# Patient Record
Sex: FEMALE | Race: WHITE | NOT HISPANIC OR LATINO | Employment: FULL TIME | URBAN - METROPOLITAN AREA
[De-identification: names, ages, dates, MRNs, and addresses within clinical notes are randomized per-mention and may not be internally consistent; named-entity substitution may affect disease eponyms.]

---

## 2018-06-22 ENCOUNTER — OFFICE VISIT (OUTPATIENT)
Dept: URGENT CARE | Facility: CLINIC | Age: 53
End: 2018-06-22

## 2018-06-22 ENCOUNTER — APPOINTMENT (OUTPATIENT)
Dept: RADIOLOGY | Facility: IMAGING CENTER | Age: 53
End: 2018-06-22
Attending: NURSE PRACTITIONER

## 2018-06-22 VITALS
WEIGHT: 104 LBS | DIASTOLIC BLOOD PRESSURE: 86 MMHG | HEART RATE: 80 BPM | OXYGEN SATURATION: 97 % | SYSTOLIC BLOOD PRESSURE: 130 MMHG | TEMPERATURE: 98 F | HEIGHT: 62 IN | BODY MASS INDEX: 19.14 KG/M2 | RESPIRATION RATE: 16 BRPM

## 2018-06-22 DIAGNOSIS — M94.0 ACUTE COSTOCHONDRITIS: ICD-10-CM

## 2018-06-22 DIAGNOSIS — R07.81 RIB PAIN ON RIGHT SIDE: ICD-10-CM

## 2018-06-22 DIAGNOSIS — R07.89 DISCOMFORT IN CHEST: ICD-10-CM

## 2018-06-22 PROCEDURE — 99213 OFFICE O/P EST LOW 20 MIN: CPT | Performed by: NURSE PRACTITIONER

## 2018-06-22 PROCEDURE — 71101 X-RAY EXAM UNILAT RIBS/CHEST: CPT | Mod: TC,RT | Performed by: NURSE PRACTITIONER

## 2018-06-22 PROCEDURE — 93000 ELECTROCARDIOGRAM COMPLETE: CPT | Performed by: NURSE PRACTITIONER

## 2018-06-22 ASSESSMENT — ENCOUNTER SYMPTOMS
SPUTUM PRODUCTION: 0
DIAPHORESIS: 0
VOMITING: 0
COUGH: 1
SHORTNESS OF BREATH: 0
BACK PAIN: 1
DIZZINESS: 0
HEMOPTYSIS: 0
NAUSEA: 0
FEVER: 0
CHILLS: 0
WHEEZING: 0

## 2018-06-22 NOTE — PROGRESS NOTES
"Subjective:      Alexus Saldaña is a 53 y.o. female who presents with Rib Pain            HPI    ROS       Objective:     /86   Pulse 80   Temp 36.7 °C (98 °F)   Resp 16   Ht 1.575 m (5' 2\")   Wt 47.2 kg (104 lb)   SpO2 97%   BMI 19.02 kg/m²      Physical Exam            Assessment/Plan:     There are no diagnoses linked to this encounter.      "

## 2018-06-22 NOTE — PROGRESS NOTES
Subjective:      Alexus Saldaña is a 53 y.o. female who presents with Rib Pain            HPI New problem. 53 year old female presenting with right sided rib pain with no definite injury as well as chest discomfort that she cannot articulate. She denies fever, chills, shortness of breath, wheezing. She is a smoker and was at one time on lipitor for cholesterol. She states her rib pain is worse with deep breath and cough. She has no nausea or diaphoresis. She also has milder pain on left side of back. Taking aleve for this with no improvement. Symptoms have been ongoing for 3 weeks.  Patient has no known allergies.  Current Outpatient Prescriptions on File Prior to Visit   Medication Sig Dispense Refill   • ibuprofen (MOTRIN) 600 MG Tab Take 600 mg by mouth every 6 hours as needed.     • Cholecalciferol (VITAMIN D PO) Take  by mouth.     • phenazopyridine (PYRIDIUM) 200 MG Tab Take 1 Tab by mouth 3 times a day as needed. 6 Tab 0   • loratadine (CLARITIN) 10 MG Tab Take 10 mg by mouth every day.     • aspirin (ASA) 325 MG Tab Take 325 mg by mouth every 6 hours as needed.     • atorvastatin (LIPITOR) 40 MG Tab Take 1 Tab by mouth every day. 30 Tab 11     No current facility-administered medications on file prior to visit.      Social History     Social History   • Marital status:      Spouse name: N/A   • Number of children: N/A   • Years of education: N/A     Occupational History   • Not on file.     Social History Main Topics   • Smoking status: Current Every Day Smoker     Packs/day: 0.50     Types: Cigarettes   • Smokeless tobacco: Never Used   • Alcohol use Yes      Comment: occ   • Drug use: No   • Sexual activity: Not on file     Other Topics Concern   • Not on file     Social History Narrative   • No narrative on file     family history is not on file.      Review of Systems   Constitutional: Positive for malaise/fatigue. Negative for chills, diaphoresis and fever.   HENT: Negative for congestion.   "  Respiratory: Positive for cough. Negative for hemoptysis, sputum production, shortness of breath and wheezing.    Cardiovascular: Positive for chest pain.   Gastrointestinal: Negative for nausea and vomiting.   Musculoskeletal: Positive for back pain.   Neurological: Negative for dizziness.          Objective:     /86   Pulse 80   Temp 36.7 °C (98 °F)   Resp 16   Ht 1.575 m (5' 2\")   Wt 47.2 kg (104 lb)   SpO2 97%   BMI 19.02 kg/m²      Physical Exam   Constitutional: She is oriented to person, place, and time. She appears well-developed and well-nourished. No distress.   HENT:   Head: Normocephalic and atraumatic.   Eyes: Conjunctivae are normal. Right eye exhibits no discharge. Left eye exhibits no discharge.   Neck: Normal range of motion. Neck supple.   Cardiovascular: Normal rate, regular rhythm and normal heart sounds.    No murmur heard.  Pulmonary/Chest: Effort normal and breath sounds normal. No respiratory distress. She exhibits tenderness.   Musculoskeletal: Normal range of motion.   Normal movement of all 4 extremities.   Lymphadenopathy:     She has no cervical adenopathy.        Right: No supraclavicular adenopathy present.        Left: No supraclavicular adenopathy present.   Neurological: She is alert and oriented to person, place, and time. Gait normal.   Skin: Skin is warm and dry.   Psychiatric: She has a normal mood and affect. Her behavior is normal. Thought content normal.   Nursing note and vitals reviewed.              Assessment/Plan:     1. Acute costochondritis     2. Discomfort in chest  EKG - Clinic performed   3. Rib pain on right side  [id number] (WITH 1-VIEW CXR) RIGHT     ekg with NSR, rate of 69, no ectopy or ST deviation  Chest with no evidence of rib fracture  nsaids and rest.  Patient information handout regarding diagnosis given to patient with AVS.  "

## 2018-07-13 ENCOUNTER — HOSPITAL ENCOUNTER (OUTPATIENT)
Facility: MEDICAL CENTER | Age: 53
End: 2018-07-13
Attending: PHYSICIAN ASSISTANT

## 2018-07-13 ENCOUNTER — OFFICE VISIT (OUTPATIENT)
Dept: URGENT CARE | Facility: CLINIC | Age: 53
End: 2018-07-13

## 2018-07-13 VITALS
BODY MASS INDEX: 19.14 KG/M2 | RESPIRATION RATE: 16 BRPM | DIASTOLIC BLOOD PRESSURE: 74 MMHG | HEART RATE: 98 BPM | HEIGHT: 62 IN | SYSTOLIC BLOOD PRESSURE: 124 MMHG | WEIGHT: 104 LBS | TEMPERATURE: 99.5 F | OXYGEN SATURATION: 97 %

## 2018-07-13 DIAGNOSIS — R30.0 DYSURIA: ICD-10-CM

## 2018-07-13 DIAGNOSIS — R30.0 DYSURIA: Primary | ICD-10-CM

## 2018-07-13 LAB
APPEARANCE UR: NORMAL
BILIRUB UR STRIP-MCNC: NEGATIVE MG/DL
COLOR UR AUTO: YELLOW
GLUCOSE UR STRIP.AUTO-MCNC: NEGATIVE MG/DL
KETONES UR STRIP.AUTO-MCNC: NEGATIVE MG/DL
LEUKOCYTE ESTERASE UR QL STRIP.AUTO: NORMAL
NITRITE UR QL STRIP.AUTO: POSITIVE
PH UR STRIP.AUTO: 7 [PH] (ref 5–8)
PROT UR QL STRIP: 100 MG/DL
RBC UR QL AUTO: NORMAL
SP GR UR STRIP.AUTO: 1.01
UROBILINOGEN UR STRIP-MCNC: NEGATIVE MG/DL

## 2018-07-13 PROCEDURE — 81002 URINALYSIS NONAUTO W/O SCOPE: CPT | Performed by: PHYSICIAN ASSISTANT

## 2018-07-13 PROCEDURE — 87186 SC STD MICRODIL/AGAR DIL: CPT

## 2018-07-13 PROCEDURE — 87086 URINE CULTURE/COLONY COUNT: CPT

## 2018-07-13 PROCEDURE — 99214 OFFICE O/P EST MOD 30 MIN: CPT | Performed by: PHYSICIAN ASSISTANT

## 2018-07-13 PROCEDURE — 87077 CULTURE AEROBIC IDENTIFY: CPT

## 2018-07-13 RX ORDER — SULFAMETHOXAZOLE AND TRIMETHOPRIM 800; 160 MG/1; MG/1
1 TABLET ORAL EVERY 12 HOURS
Qty: 14 TAB | Refills: 0 | Status: SHIPPED | OUTPATIENT
Start: 2018-07-13 | End: 2018-07-20

## 2018-07-13 RX ORDER — PHENAZOPYRIDINE HYDROCHLORIDE 200 MG/1
200 TABLET, FILM COATED ORAL 3 TIMES DAILY
Qty: 6 TAB | Refills: 0 | Status: SHIPPED | OUTPATIENT
Start: 2018-07-13 | End: 2018-07-15

## 2018-07-13 NOTE — LETTER
July 13, 2018       Patient: Alexus Saldaña   YOB: 1965   Date of Visit: 7/13/2018         To Whom It May Concern:    It is my medical opinion that Alexus Saldaña may be excused from work for the date of 7/13/18.      If you have any questions or concerns, please don't hesitate to call [phone]          Sincerely,          Sen Traylor P.A.-C.  Electronically Signed

## 2018-07-13 NOTE — PROGRESS NOTES
"Subjective:      Pt is a 53 y.o. female who presents with UTI (pt states she noticed blood in her urine x 1 week and states her insides hurt)            HPI  PT comes into the UC with a chief complaint of dysuria, burning on urination, urgency, frequency, and bladder pressure and has noticed blood in her urine x 7 days. PT denies fevers or chills, CP, SOB, NVD, paresthesias, headaches, dizziness, change in vision, hives, or joint pain. PT states the pain is a 5/10 with burning upon urination, aching in nature and worse at night. She states she has not taken any RX meds for this issue. She denies flank or back pain as well. The pt's medication list, problem list, and allergies have been evaluated and reviewed during today's visit.      PMH:  Past Medical History:   Diagnosis Date   • TIA (transient ischemic attack)     pt states \"I have been diagnosed with mini strokes\"       PSH:  Past Surgical History:   Procedure Laterality Date   • BIOPSY GENERAL      thyroid   • BREAST BIOPSY         Fam Hx:  the patient's family history is not pertinent to their current complaint    Soc HX:  Social History     Social History   • Marital status:      Spouse name: N/A   • Number of children: N/A   • Years of education: N/A     Occupational History   • Not on file.     Social History Main Topics   • Smoking status: Current Every Day Smoker     Packs/day: 0.50     Types: Cigarettes   • Smokeless tobacco: Never Used   • Alcohol use Yes      Comment: occ   • Drug use: No   • Sexual activity: Not on file     Other Topics Concern   • Not on file     Social History Narrative   • No narrative on file         Medications:    Current Outpatient Prescriptions:   •  Acetaminophen (TYLENOL EXTRA STRENGTH PO), Take  by mouth., Disp: , Rfl:   •  sulfamethoxazole-trimethoprim (BACTRIM DS) 800-160 MG tablet, Take 1 Tab by mouth every 12 hours for 7 days., Disp: 14 Tab, Rfl: 0  •  phenazopyridine (PYRIDIUM) 200 MG Tab, Take 1 Tab by mouth 3 " "times a day for 2 days., Disp: 6 Tab, Rfl: 0  •  ibuprofen (MOTRIN) 600 MG Tab, Take 600 mg by mouth every 6 hours as needed., Disp: , Rfl:   •  loratadine (CLARITIN) 10 MG Tab, Take 10 mg by mouth every day., Disp: , Rfl:   •  aspirin (ASA) 325 MG Tab, Take 325 mg by mouth every 6 hours as needed., Disp: , Rfl:   •  Cholecalciferol (VITAMIN D PO), Take  by mouth., Disp: , Rfl:   •  atorvastatin (LIPITOR) 40 MG Tab, Take 1 Tab by mouth every day., Disp: 30 Tab, Rfl: 11      Allergies:  Patient has no known allergies.    ROS  Review of Systems   Constitutional: Negative for fever, chills and malaise/fatigue.   HENT: Negative for congestion and sore throat.    Eyes: Negative for blurred vision, double vision and photophobia.   Respiratory: Negative for cough and shortness of breath.    Cardiovascular: Negative for chest pain and palpitations.   Gastrointestinal: Negative for nausea, vomiting, abdominal pain, diarrhea and constipation.   Genitourinary: Positive for dysuria, urgency and frequency.   Musculoskeletal: Negative for joint pain and myalgias.   Skin: Negative for rash.   Neurological: Negative for dizziness, tingling and headaches.   Endo/Heme/Allergies: Does not bruise/bleed easily.   Psychiatric/Behavioral: Negative for depression. The patient is not nervous/anxious.           Objective:     /74   Pulse 98   Temp 37.5 °C (99.5 °F)   Resp 16   Ht 1.575 m (5' 2\")   Wt 47.2 kg (104 lb)   SpO2 97%   BMI 19.02 kg/m²      Physical Exam      Physical Exam   Constitutional: She is oriented to person, place, and time. She appears well-developed and well-nourished. No distress.   HENT:   Head: Normocephalic and atraumatic.   Right Ear: External ear normal.   Left Ear: External ear normal.   Nose: Nose normal.   Mouth/Throat: Oropharynx is clear and moist. No oropharyngeal exudate.   Eyes: Conjunctivae normal and EOM are normal. Pupils are equal, round, and reactive to light.   Neck: Normal range of " motion. Neck supple. No thyromegaly present.   Cardiovascular: Normal rate, regular rhythm, normal heart sounds and intact distal pulses.  Exam reveals no gallop and no friction rub.    No murmur heard.  Pulmonary/Chest: Effort normal and breath sounds normal. No respiratory distress. She has no wheezes. She has no rales. She exhibits no tenderness.   Abdominal: Soft. Bowel sounds are normal. She exhibits no distension and no mass. There is no tenderness. There is no rebound and no guarding.   Genitourinary:        Pt deferred   Musculoskeletal: Normal range of motion. She exhibits no edema and no tenderness.   Lymphadenopathy:     She has no cervical adenopathy.   Neurological: She is alert and oriented to person, place, and time. She has normal reflexes. No cranial nerve deficit.   Skin: Skin is warm and dry. No rash noted. No erythema.   Psychiatric: She has a normal mood and affect. Her behavior is normal. Judgment and thought content normal.          Assessment/Plan:     1. Dysuria    - POCT Urinalysis-->LEUKS AND BLOOD  - Urine Culture; Future  - sulfamethoxazole-trimethoprim (BACTRIM DS) 800-160 MG tablet; Take 1 Tab by mouth every 12 hours for 7 days.  Dispense: 14 Tab; Refill: 0  - phenazopyridine (PYRIDIUM) 200 MG Tab; Take 1 Tab by mouth 3 times a day for 2 days.  Dispense: 6 Tab; Refill: 0    Rest, fluids encouraged.  AVS with medical info given.  Pt was in full understanding and agreement with the plan.  Follow-up as needed if symptoms worsen or fail to improve.

## 2018-07-15 LAB
BACTERIA UR CULT: ABNORMAL
BACTERIA UR CULT: ABNORMAL
SIGNIFICANT IND 70042: ABNORMAL
SITE SITE: ABNORMAL
SOURCE SOURCE: ABNORMAL

## 2018-07-16 ENCOUNTER — TELEPHONE (OUTPATIENT)
Dept: URGENT CARE | Facility: CLINIC | Age: 53
End: 2018-07-16

## 2018-07-16 ENCOUNTER — HOSPITAL ENCOUNTER (EMERGENCY)
Facility: MEDICAL CENTER | Age: 53
End: 2018-07-16
Attending: EMERGENCY MEDICINE
Payer: COMMERCIAL

## 2018-07-16 VITALS
WEIGHT: 99.21 LBS | HEIGHT: 62 IN | OXYGEN SATURATION: 98 % | DIASTOLIC BLOOD PRESSURE: 77 MMHG | TEMPERATURE: 98.3 F | BODY MASS INDEX: 18.26 KG/M2 | HEART RATE: 74 BPM | SYSTOLIC BLOOD PRESSURE: 111 MMHG | RESPIRATION RATE: 16 BRPM

## 2018-07-16 DIAGNOSIS — R11.2 NON-INTRACTABLE VOMITING WITH NAUSEA, UNSPECIFIED VOMITING TYPE: ICD-10-CM

## 2018-07-16 LAB
ALBUMIN SERPL BCP-MCNC: 3.2 G/DL (ref 3.2–4.9)
ALBUMIN/GLOB SERPL: 0.9 G/DL
ALP SERPL-CCNC: 83 U/L (ref 30–99)
ALT SERPL-CCNC: 25 U/L (ref 2–50)
ANION GAP SERPL CALC-SCNC: 11 MMOL/L (ref 0–11.9)
APPEARANCE UR: ABNORMAL
AST SERPL-CCNC: 22 U/L (ref 12–45)
BACTERIA #/AREA URNS HPF: ABNORMAL /HPF
BASOPHILS # BLD AUTO: 0.3 % (ref 0–1.8)
BASOPHILS # BLD: 0.03 K/UL (ref 0–0.12)
BILIRUB SERPL-MCNC: 0.3 MG/DL (ref 0.1–1.5)
BILIRUB UR QL STRIP.AUTO: NEGATIVE
BUN SERPL-MCNC: 26 MG/DL (ref 8–22)
CALCIUM SERPL-MCNC: 9 MG/DL (ref 8.4–10.2)
CASTS 1761B: ABNORMAL /LPF
CASTS URNS QL MICRO: ABNORMAL /LPF
CHLORIDE SERPL-SCNC: 100 MMOL/L (ref 96–112)
CO2 SERPL-SCNC: 21 MMOL/L (ref 20–33)
COLOR UR: YELLOW
CREAT SERPL-MCNC: 1.25 MG/DL (ref 0.5–1.4)
CRYSTALS 1718B: ABNORMAL /HPF
EOSINOPHIL # BLD AUTO: 0.03 K/UL (ref 0–0.51)
EOSINOPHIL NFR BLD: 0.3 % (ref 0–6.9)
EPI CELLS #/AREA URNS HPF: ABNORMAL /HPF
ERYTHROCYTE [DISTWIDTH] IN BLOOD BY AUTOMATED COUNT: 40.9 FL (ref 35.9–50)
GLOBULIN SER CALC-MCNC: 3.5 G/DL (ref 1.9–3.5)
GLUCOSE SERPL-MCNC: 96 MG/DL (ref 65–99)
GLUCOSE UR STRIP.AUTO-MCNC: NEGATIVE MG/DL
HCT VFR BLD AUTO: 41.5 % (ref 37–47)
HGB BLD-MCNC: 14.5 G/DL (ref 12–16)
IMM GRANULOCYTES # BLD AUTO: 0.06 K/UL (ref 0–0.11)
IMM GRANULOCYTES NFR BLD AUTO: 0.7 % (ref 0–0.9)
KETONES UR STRIP.AUTO-MCNC: NEGATIVE MG/DL
LEUKOCYTE ESTERASE UR QL STRIP.AUTO: ABNORMAL
LYMPHOCYTES # BLD AUTO: 1.01 K/UL (ref 1–4.8)
LYMPHOCYTES NFR BLD: 11.6 % (ref 22–41)
MCH RBC QN AUTO: 31.9 PG (ref 27–33)
MCHC RBC AUTO-ENTMCNC: 34.9 G/DL (ref 33.6–35)
MCV RBC AUTO: 91.2 FL (ref 81.4–97.8)
MICRO URNS: ABNORMAL
MONOCYTES # BLD AUTO: 1.17 K/UL (ref 0–0.85)
MONOCYTES NFR BLD AUTO: 13.5 % (ref 0–13.4)
MUCOUS THREADS #/AREA URNS HPF: ABNORMAL /HPF
NEUTROPHILS # BLD AUTO: 6.37 K/UL (ref 2–7.15)
NEUTROPHILS NFR BLD: 73.6 % (ref 44–72)
NITRITE UR QL STRIP.AUTO: NEGATIVE
NRBC # BLD AUTO: 0 K/UL
NRBC BLD-RTO: 0 /100 WBC
PH UR STRIP.AUTO: 5.5 [PH]
PLATELET # BLD AUTO: 186 K/UL (ref 164–446)
PMV BLD AUTO: 11.1 FL (ref 9–12.9)
POTASSIUM SERPL-SCNC: 3.3 MMOL/L (ref 3.6–5.5)
PROT SERPL-MCNC: 6.7 G/DL (ref 6–8.2)
PROT UR QL STRIP: 30 MG/DL
RBC # BLD AUTO: 4.55 M/UL (ref 4.2–5.4)
RBC # URNS HPF: ABNORMAL /HPF
RBC UR QL AUTO: ABNORMAL
SODIUM SERPL-SCNC: 132 MMOL/L (ref 135–145)
SP GR UR STRIP.AUTO: 1.02
UNIDENT CRYS URNS QL MICRO: ABNORMAL /HPF
WBC # BLD AUTO: 8.7 K/UL (ref 4.8–10.8)
WBC #/AREA URNS HPF: ABNORMAL /HPF

## 2018-07-16 PROCEDURE — 99285 EMERGENCY DEPT VISIT HI MDM: CPT

## 2018-07-16 PROCEDURE — 81001 URINALYSIS AUTO W/SCOPE: CPT

## 2018-07-16 PROCEDURE — 96374 THER/PROPH/DIAG INJ IV PUSH: CPT

## 2018-07-16 PROCEDURE — 80053 COMPREHEN METABOLIC PANEL: CPT

## 2018-07-16 PROCEDURE — 96375 TX/PRO/DX INJ NEW DRUG ADDON: CPT

## 2018-07-16 PROCEDURE — 700105 HCHG RX REV CODE 258: Performed by: EMERGENCY MEDICINE

## 2018-07-16 PROCEDURE — 36415 COLL VENOUS BLD VENIPUNCTURE: CPT

## 2018-07-16 PROCEDURE — 85025 COMPLETE CBC W/AUTO DIFF WBC: CPT

## 2018-07-16 PROCEDURE — 96361 HYDRATE IV INFUSION ADD-ON: CPT

## 2018-07-16 PROCEDURE — 700111 HCHG RX REV CODE 636 W/ 250 OVERRIDE (IP): Performed by: EMERGENCY MEDICINE

## 2018-07-16 RX ORDER — SODIUM CHLORIDE 9 MG/ML
1000 INJECTION, SOLUTION INTRAVENOUS ONCE
Status: COMPLETED | OUTPATIENT
Start: 2018-07-16 | End: 2018-07-16

## 2018-07-16 RX ORDER — ONDANSETRON 2 MG/ML
4 INJECTION INTRAMUSCULAR; INTRAVENOUS ONCE
Status: COMPLETED | OUTPATIENT
Start: 2018-07-16 | End: 2018-07-16

## 2018-07-16 RX ORDER — NAPROXEN SODIUM 220 MG
220 TABLET ORAL 2 TIMES DAILY PRN
Status: SHIPPED | COMMUNITY
End: 2021-01-09

## 2018-07-16 RX ORDER — IBUPROFEN 200 MG
400 TABLET ORAL
Status: SHIPPED | COMMUNITY
End: 2021-01-09

## 2018-07-16 RX ORDER — KETOROLAC TROMETHAMINE 30 MG/ML
15 INJECTION, SOLUTION INTRAMUSCULAR; INTRAVENOUS ONCE
Status: COMPLETED | OUTPATIENT
Start: 2018-07-16 | End: 2018-07-16

## 2018-07-16 RX ADMIN — ONDANSETRON 4 MG: 2 INJECTION, SOLUTION INTRAMUSCULAR; INTRAVENOUS at 14:34

## 2018-07-16 RX ADMIN — KETOROLAC TROMETHAMINE 15 MG: 30 INJECTION, SOLUTION INTRAMUSCULAR at 14:34

## 2018-07-16 RX ADMIN — SODIUM CHLORIDE 1000 ML: 9 INJECTION, SOLUTION INTRAVENOUS at 14:34

## 2018-07-16 ASSESSMENT — PAIN SCALES - GENERAL: PAINLEVEL_OUTOF10: 0

## 2018-07-16 NOTE — ED NOTES
Seen at [facility]  Fri Dx UTI went in again today for recheck and referred to  ED Pt reports too nauseated to eat No emesis Denies black tary stools   Chills on Sat

## 2018-07-16 NOTE — ED PROVIDER NOTES
"ED Provider Note    CHIEF COMPLAINT  Chief Complaint   Patient presents with   • Abdominal Pain     Burning in abd since Fri       HPI  Alexus Saldaña is a 53 y.o. female who presents with a constant burning pain in her lower abdomen for the last 3 days since she started antibiotics for a urinary tract infection.  She has been taking the Bactrim regularly.  She has been very nauseated without vomiting.  She denies fevers over the last 2 days and has had no diarrhea.  She feels dehydrated and is not eating.  She states that the dysuria and urinary frequency has resolved.  She is status post 2 C-sections but no other intra-abdominal surgeries.  She denies rashes, unusual vaginal discharge.  She has had intermittent headaches since then.    REVIEW OF SYSTEMS  See HPI for further details. All other systems are negative.    PAST MEDICAL HISTORY  Past Medical History:   Diagnosis Date   • TIA (transient ischemic attack)     pt states \"I have been diagnosed with mini strokes\"       FAMILY HISTORY  No family history on file.    SOCIAL HISTORY  Social History     Social History   • Marital status:      Spouse name: N/A   • Number of children: N/A   • Years of education: N/A     Social History Main Topics   • Smoking status: Current Every Day Smoker     Packs/day: 0.50     Types: Cigarettes   • Smokeless tobacco: Never Used   • Alcohol use Yes      Comment: occ   • Drug use: No   • Sexual activity: Not on file     Other Topics Concern   • Not on file     Social History Narrative   • No narrative on file       SURGICAL HISTORY  Past Surgical History:   Procedure Laterality Date   • BIOPSY GENERAL      thyroid   • BREAST BIOPSY         CURRENT MEDICATIONS        Current Outpatient Prescriptions:   •  Acetaminophen (TYLENOL EXTRA STRENGTH PO), Take  by mouth., Disp: , Rfl:   •  sulfamethoxazole-trimethoprim (BACTRIM DS) 800-160 MG tablet, Take 1 Tab by mouth every 12 hours for 7 days., Disp: 14 Tab, Rfl: 0  •  ibuprofen " "(MOTRIN) 600 MG Tab, Take 600 mg by mouth every 6 hours as needed., Disp: , Rfl:   •  loratadine (CLARITIN) 10 MG Tab, Take 10 mg by mouth every day., Disp: , Rfl:   •  aspirin (ASA) 325 MG Tab, Take 325 mg by mouth every 6 hours as needed., Disp: , Rfl:   •  Cholecalciferol (VITAMIN D PO), Take  by mouth., Disp: , Rfl:   •  atorvastatin (LIPITOR) 40 MG Tab, Take 1 Tab by mouth every day., Disp: 30 Tab, Rfl: 11      ALLERGIES  No Known Allergies    PHYSICAL EXAM  VITAL SIGNS: /77   Pulse 99   Temp 36.8 °C (98.3 °F)   Resp 16   Ht 1.575 m (5' 2\")   Wt 45 kg (99 lb 3.3 oz)   SpO2 98%   BMI 18.15 kg/m²  @ROBERT[[id number]::@   Constitutional: Well developed, Well nourished but thin, No acute respiratory distress, Non-toxic appearance.   HENT: Normocephalic, Atraumatic, Bilateral external ears normal, Oropharynx clear, mucous membranes are dry.  Eyes: PERRLA, EOMI, Conjunctiva normal, No discharge. No icterus.  Neck: Normal range of motion. Supple, No stridor.   Lymphatic: No cervical lymphadenopathy noted.   Cardiovascular: Tachycardic, Normal rhythm, No murmurs, No rubs, No gallops.   Thorax & Lungs: Clear to auscultation bilaterally, No respiratory distress, No wheezing.  Abdomen:  Soft, scaphoid, normoactive bowel sounds, no tenderness to palpation, no mass.  No CVA tenderness  Skin: Warm, Dry, No erythema, No rash.   Extremities: Intact distal pulses, No edema, No tenderness  Musculoskeletal: Good range of motion in all major joints. No tenderness to palpation or major deformities noted. Normal gait.  Neurologic: Alert & oriented x 3, cranial nerve and cerebellar exams grossly normal. No focal deficits noted.   Psychiatric: Affect normal, Judgment normal, Mood normal.       COURSE & MEDICAL DECISION MAKING  Pertinent Labs & Imaging studies reviewed. (See chart for details)  IV fluids for tachycardia and recent poor and p.o. intake.  Zofran for nausea and Toradol for headache.  I reviewed the labs for urine " culture and the patient was positive for pansensitive E. coli on 7/13.    Results for orders placed or performed during the hospital encounter of 07/16/18   CBC WITH DIFFERENTIAL   Result Value Ref Range    WBC 8.7 4.8 - 10.8 K/uL    RBC 4.55 4.20 - 5.40 M/uL    Hemoglobin 14.5 12.0 - 16.0 g/dL    Hematocrit 41.5 37.0 - 47.0 %    MCV 91.2 81.4 - 97.8 fL    MCH 31.9 27.0 - 33.0 pg    MCHC 34.9 33.6 - 35.0 g/dL    RDW 40.9 35.9 - 50.0 fL    Platelet Count 186 164 - 446 K/uL    MPV 11.1 9.0 - 12.9 fL    Neutrophils-Polys 73.60 (H) 44.00 - 72.00 %    Lymphocytes 11.60 (L) 22.00 - 41.00 %    Monocytes 13.50 (H) 0.00 - 13.40 %    Eosinophils 0.30 0.00 - 6.90 %    Basophils 0.30 0.00 - 1.80 %    Immature Granulocytes 0.70 0.00 - 0.90 %    Nucleated RBC 0.00 /100 WBC    Neutrophils (Absolute) 6.37 2.00 - 7.15 K/uL    Lymphs (Absolute) 1.01 1.00 - 4.80 K/uL    Monos (Absolute) 1.17 (H) 0.00 - 0.85 K/uL    Eos (Absolute) 0.03 0.00 - 0.51 K/uL    Baso (Absolute) 0.03 0.00 - 0.12 K/uL    Immature Granulocytes (abs) 0.06 0.00 - 0.11 K/uL    NRBC (Absolute) 0.00 K/uL   COMP METABOLIC PANEL   Result Value Ref Range    Sodium 132 (L) 135 - 145 mmol/L    Potassium 3.3 (L) 3.6 - 5.5 mmol/L    Chloride 100 96 - 112 mmol/L    Co2 21 20 - 33 mmol/L    Anion Gap 11.0 0.0 - 11.9    Glucose 96 65 - 99 mg/dL    Bun 26 (H) 8 - 22 mg/dL    Creatinine 1.25 0.50 - 1.40 mg/dL    Calcium 9.0 8.4 - 10.2 mg/dL    AST(SGOT) 22 12 - 45 U/L    ALT(SGPT) 25 2 - 50 U/L    Alkaline Phosphatase 83 30 - 99 U/L    Total Bilirubin 0.3 0.1 - 1.5 mg/dL    Albumin 3.2 3.2 - 4.9 g/dL    Total Protein 6.7 6.0 - 8.2 g/dL    Globulin 3.5 1.9 - 3.5 g/dL    A-G Ratio 0.9 g/dL   URINALYSIS CULTURE, IF INDICATED   Result Value Ref Range    Color Yellow     Character Hazy (A)     Specific Gravity 1.025 <1.035    Ph 5.5 5.0 - 8.0    Glucose Negative Negative mg/dL    Ketones Negative Negative mg/dL    Protein 30 (A) Negative mg/dL    Bilirubin Negative Negative     Nitrite Negative Negative    Leukocyte Esterase Trace (A) Negative    Occult Blood Moderate (A) Negative    Micro Urine Req Microscopic    ESTIMATED GFR   Result Value Ref Range    GFR If  55 (A) >60 mL/min/1.73 m 2    GFR If Non African American 45 (A) >60 mL/min/1.73 m 2   URINE MICROSCOPIC (W/UA)   Result Value Ref Range    WBC 5-10 (A) /hpf    RBC 5-10 (A) /hpf    Bacteria Few (A) None /hpf    Epithelial Cells Few Few /hpf    Mucous Threads Moderate /hpf    Urine Crystals Rare Ca Oxalate /hpf    Urine Crystals Mod Amorphous /hpf    Urine Casts 0-2 Granular /lpf    Urine Casts 0-2 Hyaline /lpf      5:31 PM patient states that she feels significantly better after the IV fluid and nausea medication.  She is drinking fluids without nausea and is now hungry.  Urine does not indicate the patient has ongoing pyelonephritis and I think she will do well at home with hydration.     The patient will return for new or worsening symptoms and is stable at the time of discharge.    The patient is referred to a primary physician for blood pressure management, diabetic screening, and for all other preventative health concerns.    DISPOSITION:  Patient will be discharged home in stable condition.    FOLLOW UP:  [facility], Emergency Dept  [address]  [place] [address] [phone]    If symptoms worsen      OUTPATIENT MEDICATIONS:  New Prescriptions    No medications on file       FINAL IMPRESSION  1. Non-intractable vomiting with nausea, unspecified vomiting type               Electronically signed by: Isi Murry, 7/16/2018 2:09 PM

## 2018-07-31 ENCOUNTER — HOSPITAL ENCOUNTER (OUTPATIENT)
Facility: MEDICAL CENTER | Age: 53
End: 2018-07-31
Attending: PHYSICIAN ASSISTANT

## 2018-07-31 ENCOUNTER — OFFICE VISIT (OUTPATIENT)
Dept: URGENT CARE | Facility: CLINIC | Age: 53
End: 2018-07-31

## 2018-07-31 VITALS
OXYGEN SATURATION: 97 % | BODY MASS INDEX: 18.22 KG/M2 | WEIGHT: 99 LBS | HEART RATE: 79 BPM | TEMPERATURE: 99.2 F | DIASTOLIC BLOOD PRESSURE: 74 MMHG | RESPIRATION RATE: 16 BRPM | HEIGHT: 62 IN | SYSTOLIC BLOOD PRESSURE: 132 MMHG

## 2018-07-31 DIAGNOSIS — N30.00 ACUTE CYSTITIS WITHOUT HEMATURIA: ICD-10-CM

## 2018-07-31 LAB
APPEARANCE UR: NORMAL
BILIRUB UR STRIP-MCNC: NORMAL MG/DL
COLOR UR AUTO: YELLOW
GLUCOSE UR STRIP.AUTO-MCNC: NORMAL MG/DL
KETONES UR STRIP.AUTO-MCNC: 5 MG/DL
LEUKOCYTE ESTERASE UR QL STRIP.AUTO: NORMAL
NITRITE UR QL STRIP.AUTO: NORMAL
PH UR STRIP.AUTO: 6 [PH] (ref 5–8)
PROT UR QL STRIP: NORMAL MG/DL
RBC UR QL AUTO: NORMAL
SP GR UR STRIP.AUTO: 1.02
UROBILINOGEN UR STRIP-MCNC: NORMAL MG/DL

## 2018-07-31 PROCEDURE — 99214 OFFICE O/P EST MOD 30 MIN: CPT | Performed by: PHYSICIAN ASSISTANT

## 2018-07-31 PROCEDURE — 87086 URINE CULTURE/COLONY COUNT: CPT

## 2018-07-31 PROCEDURE — 81002 URINALYSIS NONAUTO W/O SCOPE: CPT | Performed by: PHYSICIAN ASSISTANT

## 2018-07-31 RX ORDER — CIPROFLOXACIN 500 MG/1
500 TABLET, FILM COATED ORAL EVERY 12 HOURS
Qty: 14 TAB | Refills: 0 | Status: SHIPPED | OUTPATIENT
Start: 2018-07-31 | End: 2018-08-07

## 2018-07-31 ASSESSMENT — ENCOUNTER SYMPTOMS
HEADACHES: 0
ABDOMINAL PAIN: 0
TINGLING: 0
FEVER: 0
VOMITING: 0
FOCAL WEAKNESS: 0
COUGH: 0
FLANK PAIN: 0
DIARRHEA: 0
CHILLS: 0
SENSORY CHANGE: 0
BACK PAIN: 1
NAUSEA: 0
PALPITATIONS: 0
SHORTNESS OF BREATH: 0

## 2018-08-01 DIAGNOSIS — N30.00 ACUTE CYSTITIS WITHOUT HEMATURIA: ICD-10-CM

## 2018-08-01 NOTE — PROGRESS NOTES
"Subjective:      Alexus Saldaña is a 53 y.o. female who presents with UTI (kidney pain, pressure, frequency. Started today )            Dysuria    This is a new problem. The current episode started today. The problem occurs every urination. The problem has been unchanged. The quality of the pain is described as burning. There has been no fever. Associated symptoms include frequency and urgency. Pertinent negatives include no chills, flank pain, hematuria, nausea or vomiting. She has tried NSAIDs for the symptoms. The treatment provided no relief. There is no history of kidney stones or recurrent UTIs.     Past Medical History:   Diagnosis Date   • TIA (transient ischemic attack)     pt states \"I have been diagnosed with mini strokes\"       Past Surgical History:   Procedure Laterality Date   • BIOPSY GENERAL      thyroid   • BREAST BIOPSY         History reviewed. No pertinent family history.    .alelrg    Medications, Allergies, and current problem list reviewed today in Epic      Review of Systems   Constitutional: Negative for chills, fever and malaise/fatigue.   Respiratory: Negative for cough and shortness of breath.    Cardiovascular: Negative for chest pain, palpitations and leg swelling.   Gastrointestinal: Negative for abdominal pain, diarrhea, nausea and vomiting.   Genitourinary: Positive for dysuria, frequency and urgency. Negative for flank pain and hematuria.   Musculoskeletal: Positive for back pain (lower back pain bilaterally ).   Neurological: Negative for tingling, sensory change, focal weakness and headaches.     All other systems reviewed and are negative.        Objective:     /74   Pulse 79   Temp 37.3 °C (99.2 °F)   Resp 16   Ht 1.575 m (5' 2\")   Wt 44.9 kg (99 lb)   SpO2 97%   BMI 18.11 kg/m²      Physical Exam   Constitutional: She is oriented to person, place, and time. She appears well-developed and well-nourished. No distress.   Cardiovascular: Normal rate, regular rhythm and " normal heart sounds.  Exam reveals no gallop and no friction rub.    No murmur heard.  Pulmonary/Chest: Effort normal and breath sounds normal. No respiratory distress. She has no wheezes. She has no rales.   Abdominal: Soft. Bowel sounds are normal. She exhibits no distension and no mass. There is no tenderness. There is CVA tenderness (mild bilaterally ). There is no rigidity, no rebound and no guarding.   Neurological: She is alert and oriented to person, place, and time. No cranial nerve deficit.   Psychiatric: She has a normal mood and affect. Her behavior is normal. Judgment and thought content normal.           Lab Results   Component Value Date/Time    POCCOLOR yellow 07/31/2018 07:38 PM    POCAPPEAR slightly cloudy 07/31/2018 07:38 PM    POCLEUKEST moderate 07/31/2018 07:38 PM    POCNITRITE neg 07/31/2018 07:38 PM    POCUROBILIGE neg 07/31/2018 07:38 PM    POCPROTEIN neg 07/31/2018 07:38 PM    POCURPH 6.0 07/31/2018 07:38 PM    POCBLOOD mod 07/31/2018 07:38 PM    POCSPGRV 1.020 07/31/2018 07:38 PM    POCKETONES 5 07/31/2018 07:38 PM    POCBILIRUBIN neg 07/31/2018 07:38 PM    POCGLUCUA neg 07/31/2018 07:38 PM             Assessment/Plan:     1. Acute cystitis without hematuria  POCT Urinalysis    Urine Culture    ciprofloxacin (CIPRO) 500 MG Tab     Patient refused Rocephin injection due to concerns about cost and not having insurance.      Current Outpatient Prescriptions:   •  ciprofloxacin (CIPRO) 500 MG Tab, Take 1 Tab by mouth every 12 hours for 7 days., Disp: 14 Tab, Rfl: 0    Culture urine and change tx plan appropriately.     Differential diagnoses, Supportive care, and indications for immediate follow-up discussed with patient.   Instructed to return to clinic or nearest emergency department for any change in condition, further concerns, or worsening of symptoms.    The patient demonstrated a good understanding and agreed with the treatment plan.    Diamond Welch P.A.-C.

## 2018-08-19 ENCOUNTER — OFFICE VISIT (OUTPATIENT)
Dept: URGENT CARE | Facility: CLINIC | Age: 53
End: 2018-08-19
Payer: COMMERCIAL

## 2018-08-19 VITALS
WEIGHT: 99 LBS | DIASTOLIC BLOOD PRESSURE: 60 MMHG | OXYGEN SATURATION: 95 % | HEIGHT: 62 IN | SYSTOLIC BLOOD PRESSURE: 130 MMHG | HEART RATE: 87 BPM | RESPIRATION RATE: 16 BRPM | TEMPERATURE: 98.7 F | BODY MASS INDEX: 18.22 KG/M2

## 2018-08-19 DIAGNOSIS — B96.89 BACTERIAL VAGINITIS: ICD-10-CM

## 2018-08-19 DIAGNOSIS — N76.0 BACTERIAL VAGINITIS: ICD-10-CM

## 2018-08-19 PROCEDURE — 99214 OFFICE O/P EST MOD 30 MIN: CPT | Performed by: PHYSICIAN ASSISTANT

## 2018-08-19 RX ORDER — METRONIDAZOLE 500 MG/1
500 TABLET ORAL 2 TIMES DAILY
Qty: 14 TAB | Refills: 0 | Status: SHIPPED | OUTPATIENT
Start: 2018-08-19 | End: 2018-08-26

## 2018-08-22 ASSESSMENT — ENCOUNTER SYMPTOMS
ABDOMINAL PAIN: 0
CHILLS: 0
SHORTNESS OF BREATH: 0
FEVER: 0
VOMITING: 0
DIZZINESS: 0
MUSCULOSKELETAL NEGATIVE: 1
DIARRHEA: 0
NAUSEA: 0

## 2018-08-22 NOTE — PROGRESS NOTES
"Subjective:      Alexus Saldaña is a 53 y.o. female who presents with Other (weird sensation from the vagina up, x 3 weeks now, just got insurance)            Other   Pertinent negatives include no abdominal pain, chest pain, chills, congestion, fever, nausea, rash or vomiting.     Patient presents to urgent care reporting a 3 week history of vaginal irritation and pressure in the abdomen. She was seen in urgent care when symptoms first started and treated for a UTI with a 1 week course of Cipro, which she finished as instructed without significant relief of symptoms. Urine culture from that visit showed \"probable gardnerella vaginalis\". No fevers, chills, body aches, dysuria, hematuria, abdominal pain, flank pain, nausea, or vomiting.     Review of Systems   Constitutional: Negative for chills and fever.   HENT: Negative for congestion.    Respiratory: Negative for shortness of breath.    Cardiovascular: Negative for chest pain.   Gastrointestinal: Negative for abdominal pain, diarrhea, nausea and vomiting.   Genitourinary: Negative for dysuria, frequency and urgency.        + vaginal irritation   Musculoskeletal: Negative.    Skin: Negative for rash.   Neurological: Negative for dizziness.        Objective:     /60   Pulse 87   Temp 37.1 °C (98.7 °F)   Resp 16   Ht 1.575 m (5' 2\")   Wt 44.9 kg (99 lb)   SpO2 95%   BMI 18.11 kg/m²      Physical Exam   Constitutional: She is oriented to person, place, and time. She appears well-developed and well-nourished. No distress.   HENT:   Head: Normocephalic and atraumatic.   Eyes: Pupils are equal, round, and reactive to light.   Neck: Normal range of motion.   Cardiovascular: Normal rate.    Pulmonary/Chest: Effort normal.   Abdominal: Soft. Bowel sounds are normal. She exhibits no distension. There is no tenderness. There is no guarding.   No CVAT bilaterally   Genitourinary:   Genitourinary Comments: Exam deferred   Musculoskeletal: Normal range of motion. "   Neurological: She is alert and oriented to person, place, and time.   Skin: Skin is warm and dry. She is not diaphoretic.   Psychiatric: She has a normal mood and affect. Her behavior is normal.   Nursing note and vitals reviewed.         PMH:  has a past medical history of TIA (transient ischemic attack).  MEDS:   Current Outpatient Prescriptions:   •  metroNIDAZOLE (FLAGYL) 500 MG Tab, Take 1 Tab by mouth 2 Times a Day for 7 days., Disp: 14 Tab, Rfl: 0  •  naproxen (ALEVE) 220 MG tablet, Take 220 mg by mouth 2 times a day as needed., Disp: , Rfl:   •  ibuprofen (MOTRIN) 200 MG Tab, Take 400 mg by mouth 1 time daily as needed., Disp: , Rfl:   ALLERGIES: No Known Allergies  SURGHX:   Past Surgical History:   Procedure Laterality Date   • BIOPSY GENERAL      thyroid   • BREAST BIOPSY       SOCHX:  reports that she has been smoking Cigarettes.  She has been smoking about 0.50 packs per day. She has never used smokeless tobacco. She reports that she drinks alcohol. She reports that she does not use drugs.  FH: family history is not on file.       Assessment/Plan:     1. Bacterial vaginitis  - metroNIDAZOLE (FLAGYL) 500 MG Tab; Take 1 Tab by mouth 2 Times a Day for 7 days.  Dispense: 14 Tab; Refill: 0   - Advised no alcohol while taking antibiotics    Will treat for suspected BV given urine culture result from 7/31. Monitor symptoms closely and RTC or follow up with PCP/GYN if symptoms persist/worsen. The patient demonstrated a good understanding and agreed with the treatment plan.

## 2019-04-05 ENCOUNTER — HOSPITAL ENCOUNTER (OUTPATIENT)
Dept: RADIOLOGY | Facility: MEDICAL CENTER | Age: 54
End: 2019-04-05
Attending: FAMILY MEDICINE
Payer: COMMERCIAL

## 2019-04-05 DIAGNOSIS — E04.1 THYROID NODULE: ICD-10-CM

## 2019-04-05 PROCEDURE — 76536 US EXAM OF HEAD AND NECK: CPT

## 2019-04-16 ENCOUNTER — HOSPITAL ENCOUNTER (OUTPATIENT)
Dept: RADIOLOGY | Facility: MEDICAL CENTER | Age: 54
End: 2019-04-16
Attending: FAMILY MEDICINE

## 2019-04-16 DIAGNOSIS — E04.1 THYROID NODULE: ICD-10-CM

## 2019-04-16 LAB — CYTOLOGY REG CYTOL: NORMAL

## 2019-04-16 PROCEDURE — 700101 HCHG RX REV CODE 250

## 2019-04-16 PROCEDURE — 10005 FNA BX W/US GDN 1ST LES: CPT

## 2019-04-16 PROCEDURE — 88112 CYTOPATH CELL ENHANCE TECH: CPT

## 2019-04-16 RX ORDER — LIDOCAINE HYDROCHLORIDE 20 MG/ML
INJECTION, SOLUTION INFILTRATION; PERINEURAL
Status: COMPLETED
Start: 2019-04-16 | End: 2019-04-16

## 2019-04-16 RX ADMIN — LIDOCAINE HYDROCHLORIDE: 20 INJECTION, SOLUTION INFILTRATION; PERINEURAL at 11:55

## 2019-04-16 NOTE — PROGRESS NOTES
"Patient given [facility] \"Preventing the Spread of Infection\" brochure upon arrival.     US guided Right thyroid nodule fine needle aspiration done by Dr. Valdez; Right anterior aspect of neck access site; 1 jar of cytolyt obtained and sent to pathology lab; pt tolerated the procedure well; pt hemodynamically stable pre/intra/post procedure; all questions and concerns answered prior to being d/c; patient provided with appropriate education for procedure; pt d/c home.  "

## 2019-12-31 ENCOUNTER — OFFICE VISIT (OUTPATIENT)
Dept: URGENT CARE | Facility: CLINIC | Age: 54
End: 2019-12-31
Payer: COMMERCIAL

## 2019-12-31 VITALS
OXYGEN SATURATION: 98 % | RESPIRATION RATE: 20 BRPM | BODY MASS INDEX: 2.01 KG/M2 | DIASTOLIC BLOOD PRESSURE: 70 MMHG | HEART RATE: 89 BPM | WEIGHT: 11 LBS | SYSTOLIC BLOOD PRESSURE: 110 MMHG | TEMPERATURE: 99.9 F

## 2019-12-31 DIAGNOSIS — R30.0 DYSURIA: ICD-10-CM

## 2019-12-31 DIAGNOSIS — M54.50 ACUTE LEFT-SIDED LOW BACK PAIN WITHOUT SCIATICA: ICD-10-CM

## 2019-12-31 DIAGNOSIS — N30.01 ACUTE CYSTITIS WITH HEMATURIA: ICD-10-CM

## 2019-12-31 LAB
APPEARANCE UR: CLEAR
BILIRUB UR STRIP-MCNC: NORMAL MG/DL
COLOR UR AUTO: YELLOW
GLUCOSE UR STRIP.AUTO-MCNC: NORMAL MG/DL
KETONES UR STRIP.AUTO-MCNC: NORMAL MG/DL
LEUKOCYTE ESTERASE UR QL STRIP.AUTO: NORMAL
NITRITE UR QL STRIP.AUTO: NORMAL
PH UR STRIP.AUTO: 6 [PH] (ref 5–8)
PROT UR QL STRIP: NORMAL MG/DL
RBC UR QL AUTO: NORMAL
SP GR UR STRIP.AUTO: 1.01
UROBILINOGEN UR STRIP-MCNC: 0.2 MG/DL

## 2019-12-31 PROCEDURE — 99214 OFFICE O/P EST MOD 30 MIN: CPT | Performed by: NURSE PRACTITIONER

## 2019-12-31 PROCEDURE — 81002 URINALYSIS NONAUTO W/O SCOPE: CPT | Performed by: NURSE PRACTITIONER

## 2019-12-31 ASSESSMENT — ENCOUNTER SYMPTOMS
HEADACHES: 0
ABDOMINAL PAIN: 0
BACK PAIN: 1
NAUSEA: 0
FEVER: 0
VOMITING: 0
DIZZINESS: 0
MYALGIAS: 1
WEAKNESS: 0
CHILLS: 0
DIARRHEA: 0
FLANK PAIN: 0
CONSTIPATION: 0

## 2020-01-01 NOTE — PROGRESS NOTES
Subjective:      Alexus Saldaña is a 54 y.o. female who presents with Low Back Pain (lower back (L) side,hurts when urinating-feels like something is punching kidneys)            HPI  Left side low back pain. Dysuria. See scanned Epic off-line exam sheet in media.    PMH:  has a past medical history of TIA (transient ischemic attack).  MEDS:   Current Outpatient Medications:   •  naproxen (ALEVE) 220 MG tablet, Take 220 mg by mouth 2 times a day as needed., Disp: , Rfl:   •  ibuprofen (MOTRIN) 200 MG Tab, Take 400 mg by mouth 1 time daily as needed., Disp: , Rfl:   ALLERGIES: No Known Allergies  SURGHX:   Past Surgical History:   Procedure Laterality Date   • BIOPSY GENERAL      thyroid   • BREAST BIOPSY       SOCHX:  reports that she has been smoking cigarettes. She has been smoking about 0.50 packs per day. She has never used smokeless tobacco. She reports current alcohol use. She reports that she does not use drugs.  FH: Family history was reviewed, no pertinent findings to report    Review of Systems   Constitutional: Negative for chills, fever and malaise/fatigue.   Gastrointestinal: Negative for abdominal pain, constipation, diarrhea, nausea and vomiting.   Genitourinary: Positive for dysuria, frequency and urgency. Negative for flank pain and hematuria.   Musculoskeletal: Positive for back pain and myalgias.   Skin: Negative for itching and rash.   Neurological: Negative for dizziness, weakness and headaches.   All other systems reviewed and are negative.         Objective:     /70 (BP Location: Right arm)   Pulse 89   Temp 37.7 °C (99.9 °F) (Temporal)   Resp 20   Wt (!) 4.99 kg (11 lb)   SpO2 98%   BMI 2.01 kg/m²      Physical Exam  Vitals signs reviewed.   Constitutional:       General: She is awake. She is not in acute distress.     Appearance: Normal appearance. She is well-developed. She is not ill-appearing, toxic-appearing or diaphoretic.   HENT:      Head: Normocephalic.   Eyes:       Conjunctiva/sclera: Conjunctivae normal.      Pupils: Pupils are equal, round, and reactive to light.   Neck:      Musculoskeletal: Normal range of motion and neck supple.   Cardiovascular:      Rate and Rhythm: Normal rate and regular rhythm.   Pulmonary:      Effort: Pulmonary effort is normal.      Breath sounds: Normal breath sounds.   Abdominal:      General: Bowel sounds are normal. There is no distension.      Palpations: Abdomen is soft.      Tenderness: There is tenderness. There is left CVA tenderness. There is no right CVA tenderness, guarding or rebound.   Musculoskeletal: Normal range of motion.         General: Tenderness present.   Skin:     General: Skin is warm and dry.   Neurological:      Mental Status: She is alert and oriented to person, place, and time.   Psychiatric:         Behavior: Behavior is cooperative.                 Assessment/Plan:       1. Dysuria    - POCT Urinalysis    2. Acute left-sided low back pain without sciatica    - POCT Urinalysis    3. Acute cystitis with hematuria    - URINE CULTURE(NEW); Future    Patient given paper script for Ceftin 250 mg PO BID x 7 days #14    Increase water intake  Urinate more frequently and empty bladder completely  Practice good toileting hygiene after bowel movements and sexual intercourse, refrain from sexual intercourse until infection cleared  Monitor for back/flank pain, difficulty urinating, blood in urine- need re-evaluation    [facility] for urine culture  May call [phone]

## 2020-02-24 ENCOUNTER — HOSPITAL ENCOUNTER (OUTPATIENT)
Dept: RADIOLOGY | Facility: MEDICAL CENTER | Age: 55
End: 2020-02-24
Attending: FAMILY MEDICINE
Payer: COMMERCIAL

## 2020-02-24 DIAGNOSIS — R31.9 HEMATURIA SYNDROME: ICD-10-CM

## 2020-02-24 DIAGNOSIS — R10.2 ADNEXAL TENDERNESS, RIGHT: ICD-10-CM

## 2020-02-24 PROCEDURE — 76700 US EXAM ABDOM COMPLETE: CPT

## 2020-03-03 ENCOUNTER — OFFICE VISIT (OUTPATIENT)
Dept: URGENT CARE | Facility: CLINIC | Age: 55
End: 2020-03-03
Payer: COMMERCIAL

## 2020-03-03 VITALS
DIASTOLIC BLOOD PRESSURE: 64 MMHG | RESPIRATION RATE: 12 BRPM | TEMPERATURE: 97.9 F | WEIGHT: 105.2 LBS | HEART RATE: 90 BPM | HEIGHT: 62 IN | SYSTOLIC BLOOD PRESSURE: 112 MMHG | OXYGEN SATURATION: 97 % | BODY MASS INDEX: 19.36 KG/M2

## 2020-03-03 DIAGNOSIS — J02.9 SORE THROAT: ICD-10-CM

## 2020-03-03 DIAGNOSIS — R09.82 PND (POST-NASAL DRIP): ICD-10-CM

## 2020-03-03 DIAGNOSIS — J06.9 VIRAL URI WITH COUGH: ICD-10-CM

## 2020-03-03 DIAGNOSIS — J34.89 NASAL CONGESTION WITH RHINORRHEA: ICD-10-CM

## 2020-03-03 DIAGNOSIS — R09.81 NASAL CONGESTION WITH RHINORRHEA: ICD-10-CM

## 2020-03-03 DIAGNOSIS — R05.9 COUGH: ICD-10-CM

## 2020-03-03 LAB
INT CON NEG: NEGATIVE
INT CON POS: POSITIVE
S PYO AG THROAT QL: NEGATIVE

## 2020-03-03 PROCEDURE — 87880 STREP A ASSAY W/OPTIC: CPT | Performed by: NURSE PRACTITIONER

## 2020-03-03 PROCEDURE — 99213 OFFICE O/P EST LOW 20 MIN: CPT | Performed by: NURSE PRACTITIONER

## 2020-03-03 ASSESSMENT — ENCOUNTER SYMPTOMS
ABDOMINAL PAIN: 0
TINGLING: 0
SORE THROAT: 1
WHEEZING: 0
FEVER: 0
WEAKNESS: 0
PALPITATIONS: 0
COUGH: 1
ORTHOPNEA: 0
VOMITING: 0
SHORTNESS OF BREATH: 0
SENSORY CHANGE: 0
BACK PAIN: 0
EYE REDNESS: 0
NAUSEA: 0
HEADACHES: 0
EYE DISCHARGE: 0
CONSTIPATION: 0
DIZZINESS: 0
DIARRHEA: 0
SPUTUM PRODUCTION: 0
SINUS PAIN: 0
MYALGIAS: 0
NECK PAIN: 0
CHILLS: 1

## 2020-03-03 ASSESSMENT — FIBROSIS 4 INDEX: FIB4 SCORE: 1.25

## 2020-03-03 NOTE — LETTER
March 3, 2020       Patient: Alexus Saldaña   YOB: 1965   Date of Visit: 3/3/2020         To Whom It May Concern:    It is my medical opinion that Alexus Saldaña be excused from work tomorrow due to illness.    If you have any questions or concerns, please don't hesitate to call [phone]          Sincerely,          YANDY Henriquez.  Electronically Signed

## 2020-03-04 NOTE — PROGRESS NOTES
Subjective:      Alexus Saldaña is a 55 y.o. female who presents with Sore Throat (x 2 days.  Pt. has sore throat, cough, sinus/chest congestion and chills. Pt. has a Hx of COPD.  She states cough has been 1 week. )            HPI  Nasal congestion, runny nose, sore throat x 2 days, cough: dry x 1 week. Taking allergy med, no other meds used. No salt water gargle, no nasal spray, no saline. H/o COPD, has albuterol not using. Denies SOB, chest tightness or wheeze.     PMH:  has a past medical history of TIA (transient ischemic attack).  MEDS:   Current Outpatient Medications:   •  naproxen (ALEVE) 220 MG tablet, Take 220 mg by mouth 2 times a day as needed., Disp: , Rfl:   •  ibuprofen (MOTRIN) 200 MG Tab, Take 400 mg by mouth 1 time daily as needed., Disp: , Rfl:   ALLERGIES: No Known Allergies  SURGHX:   Past Surgical History:   Procedure Laterality Date   • BIOPSY GENERAL      thyroid   • BREAST BIOPSY       SOCHX:  reports that she has been smoking cigarettes. She has been smoking about 0.50 packs per day. She has never used smokeless tobacco. She reports current alcohol use. She reports that she does not use drugs.  FH: Family history was reviewed, no pertinent findings to report    Review of Systems   Constitutional: Positive for chills and malaise/fatigue. Negative for fever.   HENT: Positive for congestion and sore throat. Negative for ear pain and sinus pain.    Eyes: Negative for discharge and redness.   Respiratory: Positive for cough. Negative for sputum production, shortness of breath and wheezing.    Cardiovascular: Negative for chest pain, palpitations and orthopnea.   Gastrointestinal: Negative for abdominal pain, constipation, diarrhea, nausea and vomiting.   Musculoskeletal: Negative for back pain, myalgias and neck pain.   Skin: Negative for itching and rash.   Neurological: Negative for dizziness, tingling, sensory change, weakness and headaches.   Endo/Heme/Allergies: Negative for  "environmental allergies.   All other systems reviewed and are negative.         Objective:     /64   Pulse 90   Temp 36.6 °C (97.9 °F) (Temporal)   Resp 12   Ht 1.575 m (5' 2\")   Wt 47.7 kg (105 lb 3.2 oz)   LMP 04/01/2015   SpO2 97%   BMI 19.24 kg/m²      Physical Exam  Vitals signs reviewed.   Constitutional:       General: She is awake. She is not in acute distress.     Appearance: Normal appearance. She is well-developed. She is not ill-appearing, toxic-appearing or diaphoretic.   HENT:      Head: Normocephalic.      Right Ear: Ear canal and external ear normal. A middle ear effusion is present.      Left Ear: Ear canal and external ear normal. A middle ear effusion is present.      Nose: Mucosal edema, congestion and rhinorrhea present. No nasal tenderness.      Mouth/Throat:      Mouth: Mucous membranes are dry.      Pharynx: Uvula midline. Pharyngeal swelling and posterior oropharyngeal erythema present. No oropharyngeal exudate or uvula swelling.      Tonsils: No tonsillar exudate or tonsillar abscesses. 1+ on the right. 1+ on the left.   Eyes:      Conjunctiva/sclera: Conjunctivae normal.      Pupils: Pupils are equal, round, and reactive to light.   Neck:      Musculoskeletal: Normal range of motion and neck supple.   Pulmonary:      Effort: Pulmonary effort is normal. No accessory muscle usage or respiratory distress.      Breath sounds: Normal breath sounds and air entry. No stridor, decreased air movement or transmitted upper airway sounds. No decreased breath sounds, wheezing, rhonchi or rales.   Musculoskeletal: Normal range of motion.   Skin:     General: Skin is warm and dry.   Neurological:      Mental Status: She is alert and oriented to person, place, and time.   Psychiatric:         Behavior: Behavior is cooperative.                 Assessment/Plan:       1. Sore throat    - POCT Rapid Strep A: NEG    2. Nasal congestion with rhinorrhea      3. PND (post-nasal drip)      4. " Cough      5. Viral URI with cough  Use albuterol prn   Increase water intake  May use Ibuprofen/Tylenol prn for any fever, body aches or throat pain  May take long acting antihistamine for seasonal allergy symptoms prn  May use saline nasal spray/tonya pot for nasal congestion prn  May use Nasacort/Flonase prn for nasal congestion  May use throat lozenges for throat discomfort  May gargle with salt water prn for throat discomfort  May drink smoothies for nutrition if too painful to swallow solid foods  Monitor for any sinus pain/pressure with sinus congestion with thick mucus production and HA, cough, SOB, fever- need re-evaluation

## 2020-03-10 ENCOUNTER — HOSPITAL ENCOUNTER (OUTPATIENT)
Dept: RADIOLOGY | Facility: MEDICAL CENTER | Age: 55
End: 2020-03-10
Attending: FAMILY MEDICINE
Payer: COMMERCIAL

## 2020-03-10 DIAGNOSIS — N28.1 ACQUIRED CYST OF KIDNEY: ICD-10-CM

## 2020-03-10 PROCEDURE — A9576 INJ PROHANCE MULTIPACK: HCPCS | Performed by: OTOLARYNGOLOGY

## 2020-03-10 PROCEDURE — 74183 MRI ABD W/O CNTR FLWD CNTR: CPT

## 2020-03-10 PROCEDURE — 700117 HCHG RX CONTRAST REV CODE 255: Performed by: OTOLARYNGOLOGY

## 2020-03-10 RX ADMIN — GADOTERIDOL 12 ML: 279.3 INJECTION, SOLUTION INTRAVENOUS at 17:19

## 2020-10-14 ENCOUNTER — HOSPITAL ENCOUNTER (OUTPATIENT)
Dept: RADIOLOGY | Facility: MEDICAL CENTER | Age: 55
End: 2020-10-14
Attending: FAMILY MEDICINE
Payer: COMMERCIAL

## 2020-10-14 DIAGNOSIS — M79.604 RIGHT LEG PAIN: ICD-10-CM

## 2020-10-14 PROCEDURE — 93971 EXTREMITY STUDY: CPT | Mod: RT

## 2020-11-17 ENCOUNTER — HOSPITAL ENCOUNTER (OUTPATIENT)
Dept: LAB | Facility: MEDICAL CENTER | Age: 55
End: 2020-11-17
Attending: FAMILY MEDICINE
Payer: COMMERCIAL

## 2020-11-17 PROCEDURE — C9803 HOPD COVID-19 SPEC COLLECT: HCPCS

## 2020-11-17 PROCEDURE — U0003 INFECTIOUS AGENT DETECTION BY NUCLEIC ACID (DNA OR RNA); SEVERE ACUTE RESPIRATORY SYNDROME CORONAVIRUS 2 (SARS-COV-2) (CORONAVIRUS DISEASE [COVID-19]), AMPLIFIED PROBE TECHNIQUE, MAKING USE OF HIGH THROUGHPUT TECHNOLOGIES AS DESCRIBED BY CMS-2020-01-R: HCPCS

## 2020-11-18 LAB
COVID ORDER STATUS COVID19: NORMAL
SARS-COV-2 RNA RESP QL NAA+PROBE: NOTDETECTED
SPECIMEN SOURCE: NORMAL

## 2020-11-20 ENCOUNTER — HOSPITAL ENCOUNTER (OUTPATIENT)
Dept: LAB | Facility: MEDICAL CENTER | Age: 55
End: 2020-11-20
Attending: FAMILY MEDICINE
Payer: COMMERCIAL

## 2020-11-20 PROCEDURE — C9803 HOPD COVID-19 SPEC COLLECT: HCPCS

## 2020-11-20 PROCEDURE — U0003 INFECTIOUS AGENT DETECTION BY NUCLEIC ACID (DNA OR RNA); SEVERE ACUTE RESPIRATORY SYNDROME CORONAVIRUS 2 (SARS-COV-2) (CORONAVIRUS DISEASE [COVID-19]), AMPLIFIED PROBE TECHNIQUE, MAKING USE OF HIGH THROUGHPUT TECHNOLOGIES AS DESCRIBED BY CMS-2020-01-R: HCPCS

## 2020-11-21 LAB — COVID ORDER STATUS COVID19: NORMAL

## 2020-11-22 LAB
SARS-COV-2 RNA RESP QL NAA+PROBE: NOTDETECTED
SPECIMEN SOURCE: NORMAL

## 2021-01-07 ENCOUNTER — OCCUPATIONAL MEDICINE (OUTPATIENT)
Dept: URGENT CARE | Facility: PHYSICIAN GROUP | Age: 56
End: 2021-01-07
Payer: COMMERCIAL

## 2021-01-07 VITALS
SYSTOLIC BLOOD PRESSURE: 136 MMHG | DIASTOLIC BLOOD PRESSURE: 82 MMHG | WEIGHT: 120 LBS | BODY MASS INDEX: 22.08 KG/M2 | HEIGHT: 62 IN | HEART RATE: 88 BPM | RESPIRATION RATE: 18 BRPM | OXYGEN SATURATION: 96 % | TEMPERATURE: 97 F

## 2021-01-07 DIAGNOSIS — M25.531 PAIN IN BOTH WRISTS: ICD-10-CM

## 2021-01-07 DIAGNOSIS — G56.03 BILATERAL CARPAL TUNNEL SYNDROME: ICD-10-CM

## 2021-01-07 DIAGNOSIS — M25.532 PAIN IN BOTH WRISTS: ICD-10-CM

## 2021-01-07 DIAGNOSIS — Z02.1 PRE-EMPLOYMENT DRUG SCREENING: ICD-10-CM
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
## 2021-01-07 LAB
AMP AMPHETAMINE: NORMAL
BREATH ALCOHOL COMMENT: NORMAL
COC COCAINE: NORMAL
INT CON NEG: NORMAL
INT CON POS: NORMAL
MET METHAMPHETAMINES: NORMAL
OPI OPIATES: NORMAL
PCP PHENCYCLIDINE: NORMAL
POC BREATHALIZER: 0 PERCENT (ref 0–0.01)
POC DRUG COMMENT 753798-OCCUPATIONAL HEALTH: NORMAL
THC: NORMAL

## 2021-01-07 PROCEDURE — 80305 DRUG TEST PRSMV DIR OPT OBS: CPT | Mod: 29 | Performed by: PHYSICIAN ASSISTANT

## 2021-01-07 PROCEDURE — 99214 OFFICE O/P EST MOD 30 MIN: CPT | Mod: 29 | Performed by: PHYSICIAN ASSISTANT

## 2021-01-07 PROCEDURE — 82075 ASSAY OF BREATH ETHANOL: CPT | Mod: 29 | Performed by: PHYSICIAN ASSISTANT

## 2021-01-07 NOTE — LETTER
"EMPLOYEE’S CLAIM FOR COMPENSATION/ REPORT OF INITIAL TREATMENT  FORM C-4    EMPLOYEE’S CLAIM - PROVIDE ALL INFORMATION REQUESTED   First Name  Alexus Last Name  Piper Birthdate                    1965                Sex  female Claim Number   [address] Age  55 y.o. Height  1.575 m (5' 2\") Weight  54.4 kg (120 lb) [facility]     [facility] Zip  [address] Telephone  [phone] (home)    Mailing Address  [facility] [address] [facility] Zip  [address] Primary Language Spoken  English    Insurer   Third Party   Jagdeep Moore   Employee's Occupation (Job Title) When Injury or Occupational Disease Occurred      Employer's Name  [employer]  Telephone  [phone]    Employer Address  [address] [place] [address]  Zip  [address]    Date of Injury  1/6/2021               Hour of Injury  10:00 AM Date Employer Notified  1/6/2021 Last Day of Work after Injury     or Occupational Disease  1/7/2021 Supervisor to Whom Injury     Reported  Count includes the [facility]   Address or Location of Accident (if applicable)  [[address] [place] [address]]   What were you doing at the time of accident? (if applicable)  Lifting heavy boxes    How did this injury or occupational disease occur? (Be specific an answer in detail. Use additional sheet if necessary)  I have been lifting heavy boxes for a couple of weaks more than normal and my ristest are now hurting me because of it.   If you believe that you have an occupational disease, when did you first have knowledge of the disability and it relationship to your employment?  NA Witnesses to the Accident  None      Nature of Injury or Occupational Disease  Sprain  Part(s) of Body Injured or Affected  Wrist (L) and Hand (L), Wrist (R) and Hand (R), N/A    I certify that the above is true and correct to the best of my knowledge and that I have provided this " information in order to obtain the benefits of [place]  I hereby authorize any physician, chiropractor, surgeon, practitioner, or other person, any hospital, including [facility] or [facility], any medical service organization, any insurance company, or other institution or organization to release to each other, any medical or other information, including benefits paid or payable, pertinent to this injury or disease, except information relative to diagnosis, treatment and/or counseling for AIDS, psychological conditions, alcohol or controlled substances, for which I must give specific authorization.  A Photostat of this authorization shall be as valid as the original.     Date 01/07/2021   [facility]   Employee’s Signature   THIS REPORT MUST BE COMPLETED AND MAILED WITHIN 3 WORKING DAYS OF TREATMENT   Place  [facility]  Name of Facility  [place]   Date  1/7/2021 Diagnosis  (Y99.0) Work related injury  (Z02.1) Pre-employment drug screening  (G56.03) Bilateral carpal tunnel syndrome  (M25.531,  M25.532) Pain in both wrists Is there evidence the injured employee was under the              influence of alcohol and/or another controlled substance at the time of accident?   Hour  11:23 AM Description of Injury or Disease  Diagnoses of Work related injury, Pre-employment drug screening, Bilateral carpal tunnel syndrome, and Pain in both wrists were pertinent to this visit. No   Treatment  Wrist splints on both wrists, ice, gentle stretching, NSAIDs, work restrictions  Have you advised the patient to remain off work five days or     more? No   X-Ray Findings      If Yes   From Date  To Date      From information given by the employee, together with medical evidence, can you directly connect this injury or occupational disease as job incurred?  Yes If No Full Duty     "  No Modified Duty  Yes   Is additional medical care by a physician indicated?  Yes If Modified Duty, Specify any Limitations / Restrictions  Light duty, no lifting more than 10#    Do you know of any previous injury or disease contributing to this condition or occupational disease?                            No   Date  1/7/2021 Print Doctor’s Name   Gibson Elizabeth P.A.-C. I certify the employer’s copy of  this form was mailed on:   Address  [address] [place] [address]. [address] Insurer’s Use Only     [facility] Zip  [address]    Provider’s Tax ID Number  [id number] Telephone  Dept: [phone]      e-GIBSON Ruelas P.A.-C.  Signature:     Degree          ORIGINAL-TREATING PHYSICIAN OR CHIROPRACTOR    PAGE 2-INSURER/TPA    PAGE 3-EMPLOYER    PAGE 4-EMPLOYEE        Form C-4 (rev.10/07)           BRIEF DESCRIPTION OF RIGHTS AND BENEFITS  (Pursuant to [place] 616C.050)    Notice of Injury or Occupational Disease (Incident Report Form C-1): If an injury or occupational disease (OD) arises out of and in the course of employment, you must provide written notice to your employer as soon as practicable, but no later than 7 days after the accident or OD. Your employer shall maintain a sufficient supply of the required forms.    Claim for Compensation (Form C-4): If medical treatment is sought, the form C-4 is available at the place of initial treatment. A completed \"Claim for Compensation\" (Form C-4) must be filed within 90 days after an accident or OD. The treating physician or chiropractor must, within 3 working days after treatment, complete and mail to the employer, the employer's insurer and third-party , the Claim for Compensation.    Medical Treatment: If you require medical treatment for your on-the-job injury or OD, you may be required to select a physician or chiropractor from a list provided by your workers’ compensation insurer, if it has contracted with an Organization for Managed Care " (MCO) or Preferred Provider Organization (PPO) or providers of health care. If your employer has not entered into a contract with an MCO or PPO, you may select a physician or chiropractor from the Panel of Physicians and Chiropractors. Any medical costs related to your industrial injury or OD will be paid by your insurer.    Temporary Total Disability (TTD): If your doctor has certified that you are unable to work for a period of at least 5 consecutive days, or 5 cumulative days in a 20-day period, or places restrictions on you that your employer does not accommodate, you may be entitled to TTD compensation.    Temporary Partial Disability (TPD): If the wage you receive upon reemployment is less than the compensation for TTD to which you are entitled, the insurer may be required to pay you TPD compensation to make up the difference. TPD can only be paid for a maximum of 24 months.    Permanent Partial Disability (PPD): When your medical condition is stable and there is an indication of a PPD as a result of your injury or OD, within 30 days, your insurer must arrange for an evaluation by a rating physician or chiropractor to determine the degree of your PPD. The amount of your PPD award depends on the date of injury, the results of the PPD evaluation, your age and wage.    Permanent Total Disability (PTD): If you are medically certified by a treating physician or chiropractor as permanently and totally disabled and have been granted a PTD status by your insurer, you are entitled to receive monthly benefits not to exceed 66 2/3% of your average monthly wage. The amount of your PTD payments is subject to reduction if you previously received a lump-sum PPD award.    Vocational Rehabilitation Services: You may be eligible for vocational rehabilitation services if you are unable to return to the job due to a permanent physical impairment or permanent restrictions as a result of your injury or occupational  disease.    Transportation and Per Darshana Reimbursement: You may be eligible for travel expenses and per darshana associated with medical treatment.    Reopening: You may be able to reopen your claim if your condition worsens after claim closure.     Appeal Process: If you disagree with a written determination issued by the insurer or the insurer does not respond to your request, you may appeal to the Department of Administration, , by following the instructions contained in your determination letter. You must appeal the determination within 70 days from the date of the determination letter at [address], [address], [place], [place] [address], or [address], [address], [place], [place] [address]. If you disagree with the  decision, you may appeal to the Department of Administration, . You must file your appeal within 30 days from the date of the  decision letter at [address], [address], [place], [place] [address], or [address], [place], [place] [address]. If you disagree with a decision of an , you may file a petition for judicial review with the District Court. You must do so within 30 days of the Appeal Officer’s decision. You may be represented by an  at your own expense or you may contact the [facility] for possible representation.    [place]  for Injured Workers (NAIW): If you disagree with a  decision, you may request that NAIW represent you without charge at an  Hearing. For information regarding denial of benefits, you may contact the [facility] at: [address], [address], [place] [address], [phone], or [address], [place] [address], [phone]    To File a Complaint with the Division: If you wish to file a complaint with the  of the Division of Industrial Relations (DIR),  please contact the  Workers’ Compensation Section, [address], [address], [place], [place] [address], telephone [phone]) [phone], or [address], [address], [place], [place] [address], telephone [phone].    For assistance with Workers’ Compensation Issues: You may contact the [facility] Office for Consumer Health Assistance, [address], [address], [place], [place] [address], Toll Free 1-418.385.9998, Web site: http://[facility].[place].gov/Programs/DIEUDONNE E-mail: dieudonne@[facility].[place].gov              __________________________________________________________________                                    _________________            Employee Name / Signature                                                                                                                            Date                                                                                                                                                                                                                              D-2 (rev. 10/20)

## 2021-01-07 NOTE — PROGRESS NOTES
"Subjective:     Alexus Saldaña is a 55 y.o. female who presents for Work-Related Injury (DOI 01/06/2021 lifting so much weight every day causing both wrist hurt, and numb. right wrist is worse. )      Date of injury 1/7/21: Mechanism injury overuse over the preceding weeks and months due to lifting heavy boxes with bilateral upper extremities.  Patient works as a  and regularly lifts and articulates 40 to 60 pound packages and over the last several weeks she has had increasing bilateral wrist discomfort.  This discomfort became acutely more painful yesterday and she was unable to perform her regular job duties and so left work.  She saw her primary care provider who directed her to Worker's Comp. but did provide her with a right wrist splint.  She has an after market left wrist splint which is causing issues.  She reports pain over the inside of both wrists as well as numbness over the outside of the right wrist and the right hands backside.  She has no specific injury or trauma.  She has no pre-existing conditions.  She has no second job    PMH:   No pertinent past medical history to this problem  MEDS:  Medications were reviewed in EMR  ALLERGIES:  Allergies were reviewed in EMR  SOCHX:  Works as a   FH:   No pertinent family history to this problem       Objective:     /82   Pulse 88   Temp 36.1 °C (97 °F) (Temporal)   Resp 18   Ht 1.575 m (5' 2\")   Wt 54.4 kg (120 lb)   LMP 04/01/2015   SpO2 96%   BMI 21.95 kg/m²     Alert nontoxic female no acute distress.  She has no bony tenderness to palpation over bilateral upper extremities.  5 out of 5 strength of the elbows and hands.  Pain with bilateral wrist flexion with 4 out of 5 strength, other planes of motion 5 out of 5 strength.  No scaphoid TTP.  Brisk cap refill in bilateral upper extremities.  Positive Tinel's and positive phalens bilaterally.     Assessment/Plan:       1. Work related injury  - POCT 6 Panel " Urine Drug Screen  - POCT Breath Alcohol Test    2. Pre-employment drug screening    Other orders  - VITAMIN D PO; Take  by mouth.    • Released to Restricted Duty FROM 1/7/2021 TO 1/14/2021  • Wear wrist splints on both upper extremities at work as well as during sleep.  Remove the splint several times per day to do gentle range of motion exercises.  Consider low-dose over-the-counter anti-inflammatories as well as ice therapy for 10 to 15 minutes every 2-3 hours.  Avoid any activity or range of motion that exacerbates pain.  Work restrictions as above with 10 pound weight limit and 5-hour restriction.  Return sooner if any issues, however follow-up in 1 week to monitor progress  • Radiographs are not indicated    PATIENT ALREADY HAD RIGHT WRIST BRACE, WILL SUPPLY WITH LEFT WRIST BRACE IN CLINIC.     Differential diagnosis, natural history, supportive care, and indications for immediate follow-up discussed.

## 2021-01-09 ENCOUNTER — OCCUPATIONAL MEDICINE (OUTPATIENT)
Dept: URGENT CARE | Facility: CLINIC | Age: 56
End: 2021-01-09
Payer: COMMERCIAL

## 2021-01-09 VITALS
RESPIRATION RATE: 14 BRPM | SYSTOLIC BLOOD PRESSURE: 126 MMHG | HEIGHT: 62 IN | OXYGEN SATURATION: 97 % | BODY MASS INDEX: 22.08 KG/M2 | WEIGHT: 120 LBS | HEART RATE: 99 BPM | TEMPERATURE: 97.6 F | DIASTOLIC BLOOD PRESSURE: 88 MMHG

## 2021-01-09 DIAGNOSIS — G56.03 BILATERAL CARPAL TUNNEL SYNDROME: ICD-10-CM

## 2021-01-09 DIAGNOSIS — M25.531 PAIN IN BOTH WRISTS: ICD-10-CM

## 2021-01-09 DIAGNOSIS — M25.532 PAIN IN BOTH WRISTS: ICD-10-CM

## 2021-01-09 PROCEDURE — 99213 OFFICE O/P EST LOW 20 MIN: CPT | Performed by: FAMILY MEDICINE

## 2021-01-09 RX ORDER — PREDNISONE 10 MG/1
TABLET ORAL
Qty: 11 TAB | Refills: 0 | Status: SHIPPED | OUTPATIENT
Start: 2021-01-09 | End: 2021-01-26

## 2021-01-09 ASSESSMENT — ENCOUNTER SYMPTOMS
SENSORY CHANGE: 1
VOMITING: 0
SHORTNESS OF BREATH: 0
FEVER: 0
COUGH: 0
SORE THROAT: 0
TINGLING: 1

## 2021-01-09 NOTE — PROGRESS NOTES
"Subjective:     Alexus Saldaña is a 55 y.o. female who presents for Wrist Pain (WC BOTH WRIST, patient states it's not a f/u visit, pain is getting worse)      HPI  Pt presents for follow-up bilateral wrist pain   DOI: 1/6/21   SAMIR: Repetitive motion type injury from lifting heavy boxes   Pain is actually worsening   Having more numbness in digits 2-4 in both hands   Numbness radiates into distal forearm   Having some new right elbow pain the last few days   Using wrist braces and helps a little     Review of Systems   Constitutional: Negative for fever.   HENT: Negative for sore throat.    Respiratory: Negative for cough and shortness of breath.    Gastrointestinal: Negative for vomiting.   Skin: Negative for rash.   Neurological: Positive for tingling and sensory change.     PMH:               No pertinent past medical history to this problem  MEDS:             Medications were reviewed in EMR  ALLERGIES:   Allergies were reviewed in EMR  SOCHX:          Works as a   FH:                  No pertinent family history to this problem     Objective:   /88 (BP Location: Left arm, Patient Position: Sitting, BP Cuff Size: Adult)   Pulse 99   Temp 36.4 °C (97.6 °F) (Temporal)   Resp 14   Ht 1.575 m (5' 2\")   Wt 54.4 kg (120 lb)   LMP 04/01/2015   SpO2 97%   BMI 21.95 kg/m²     Physical Exam  Constitutional:       General: She is not in acute distress.     Appearance: She is well-developed. She is not diaphoretic.   HENT:      Head: Normocephalic and atraumatic.   Pulmonary:      Effort: Pulmonary effort is normal.   Skin:     General: Skin is warm and dry.   Neurological:      Mental Status: She is alert and oriented to person, place, and time.   Psychiatric:         Behavior: Behavior normal.         Thought Content: Thought content normal.         Judgment: Judgment normal.     Bilateral upper extremities   General: no gross deformity, ecchymosis, or erythema  Palpation: TTP along right " proximal extensor bundle, +TTP along bilateral volar wrists  ROM: FROM  Special testing: +Phalen's  Neuro: median, radial, ulnar nerves intact on testing  Vascular: radial, ulnar pulses 2+ and symmetric, cap refill <2 sec    Assessment/Plan:   Assessment    1. Pain in both wrists  - REFERRAL TO OCCUPATIONAL MEDICINE  - predniSONE (DELTASONE) 10 MG Tab; Take 2 tabs (20mg) daily x 3 days, then take 1 tab (10mg) daily x 3 days, then take 1/2 tab (5mg) daily x 4 days  Dispense: 11 Tab; Refill: 0    2. Bilateral carpal tunnel syndrome  - REFERRAL TO OCCUPATIONAL MEDICINE  - predniSONE (DELTASONE) 10 MG Tab; Take 2 tabs (20mg) daily x 3 days, then take 1 tab (10mg) daily x 3 days, then take 1/2 tab (5mg) daily x 4 days  Dispense: 11 Tab; Refill: 0    Patient with bilateral carpal tunnel syndrome.  Having increasing numbness.  Reviewed risks and benefits of steroids and patient opted to trial short burst steroids to decrease swelling and improve symptoms.  Emphasized importance of wearing braces all the time and removing occasionally for gentle range of motion exercises.  Patient will be referred to occupational medicine for further follow-up.  If not making adequate improvement by next visit, may need to consider occupational therapy/hand therapy referral with or without an EMG.

## 2021-01-09 NOTE — LETTER
[address] [place] [address]  Phone:  [phone] - Fax:  [phone]   [facility] Progress Report and Disability Certification  Date of Service: 1/9/2021   No Show:  No  Date / Time of Next Visit: 1/15/2021@1:00 PM   Claim Information   Patient Name: Alexus Saldaña  Claim Number:     Employer: [employer]  Date of Injury: 1/6/2021     Insurer / TPA: Jagdeep Moore  ID / SSN:     Occupation:   Diagnosis: Diagnoses of Pain in both wrists and Bilateral carpal tunnel syndrome were pertinent to this visit.    Medical Information   Related to Industrial Injury? Yes    Subjective Complaints:  Pt presents for follow-up bilateral wrist pain   DOI: 1/6/21   SAMIR: Repetitive motion type injury from lifting heavy boxes   Pain is actually worsening   Having more numbness in digits 2-4 in both hands   Numbness radiates into distal forearm   Having some new right elbow pain the last few days   Using wrist braces and helps a little    Objective Findings: Bilateral upper extremities   General: no gross deformity, ecchymosis, or erythema  Palpation: TTP along right proximal extensor bundle, +TTP along bilateral volar wrists  ROM: FROM  Special testing: +Phalen's  Neuro: median, radial, ulnar nerves intact on testing  Vascular: radial, ulnar pulses 2+ and symmetric, cap refill <2 sec   Pre-Existing Condition(s):     Assessment:   Condition Worsened    Status: Additional Care Required  Permanent Disability:No    Plan: Medication    Diagnostics:      Comments:       Disability Information   Status: Released to Restricted Duty    From:  1/9/2021  Through: 1/15/2021 Restrictions are: Temporary   Physical Restrictions   Sitting:    Standing:    Stooping:    Bending:      Squatting:    Walking:    Climbing:    Pushing:      Pulling:    Other:    Reaching Above Shoulder (L):   Reaching Above Shoulder (R):       Reaching Below Shoulder  (L):    Reaching Below Shoulder (R):      Not to exceed Weight Limits   Carrying(hrs): 5 Weight Limit(lb): < or = to 10 pounds Lifting(hrs): 5 Weight  Limit(lb): < or = to 10 pounds   Comments: Wear wrist splints on both upper extremities at work as well as during sleep.  Remove the splint several times per day to do gentle range of motion exercises.  Consider low-dose over-the-counter anti-inflammatories as well as ice therapy for 10 to 15 minutes every 2-3 hours.  Avoid any activity or range of motion that exacerbates pain.  Work restrictions as above with 10 pound weight limit and 5-hour restriction.    Repetitive Actions   Hands: i.e. Fine Manipulations from Grasping:     Feet: i.e. Operating Foot Controls:     Driving / Operate Machinery:     Provider Name:   Michael Truong M.D. Physician Signature:  Physician Name:     Clinic Name / Location: [address] [place] [address] Clinic Phone Number: Dept: [phone]   Appointment Time: 1:15 Pm Visit Start Time: 1:43 PM   Check-In Time:  12:34 Pm Visit Discharge Time:  2:12 PM   Original-Treating Physician or Chiropractor    Page 2-Insurer/TPA    Page 3-Employer    Page 4-Employee

## 2021-01-15 ENCOUNTER — OCCUPATIONAL MEDICINE (OUTPATIENT)
Dept: OCCUPATIONAL MEDICINE | Facility: CLINIC | Age: 56
End: 2021-01-15
Payer: COMMERCIAL

## 2021-01-15 VITALS
TEMPERATURE: 97.9 F | SYSTOLIC BLOOD PRESSURE: 136 MMHG | DIASTOLIC BLOOD PRESSURE: 84 MMHG | OXYGEN SATURATION: 96 % | HEIGHT: 62 IN | RESPIRATION RATE: 18 BRPM | BODY MASS INDEX: 22.08 KG/M2 | WEIGHT: 120 LBS | HEART RATE: 90 BPM

## 2021-01-15 DIAGNOSIS — S63.502D SPRAIN OF LEFT WRIST, SUBSEQUENT ENCOUNTER: ICD-10-CM

## 2021-01-15 DIAGNOSIS — M25.532 BILATERAL WRIST PAIN: ICD-10-CM

## 2021-01-15 DIAGNOSIS — S63.501D SPRAIN OF RIGHT WRIST, SUBSEQUENT ENCOUNTER: ICD-10-CM

## 2021-01-15 DIAGNOSIS — M25.531 BILATERAL WRIST PAIN: ICD-10-CM

## 2021-01-15 PROCEDURE — 99213 OFFICE O/P EST LOW 20 MIN: CPT | Performed by: NURSE PRACTITIONER

## 2021-01-15 NOTE — LETTER
[address],   [address] [place] [address]  Phone:  [phone] - Fax:  [phone]   [facility] Progress Report and Disability Certification  Date of Service: 1/15/2021   No Show:  No  Date / Time of Next Visit: 2/4/2021 @ 4:15 PM   Claim Information   Patient Name: Alexus Saldaña  Claim Number:     Employer: [employer]  Date of Injury:      Insurer / TPA: Jagdeep Moore  ID / SSN:     Occupation:   Diagnosis: Diagnoses of Bilateral wrist pain, Sprain of left wrist, subsequent encounter, and Sprain of right wrist, subsequent encounter were pertinent to this visit.    Medical Information   Related to Industrial Injury?   Comments:Indeterminant, no mechanism of injury    Subjective Complaints:  DOI: 1/6/21: SAMIR: Repetitive motion type injury from lifting heavy boxes.  Today no improvement, states feels worse.  Patient states that she has more numbness in digits 2-4 in both hands, numbness radiates from distal forearm into elbow bilaterally, she states she feels her  is weak, she is dropping things more frequently, and having some continued elbow pain bilaterally.  Patient has no pertinent negatives at this time.  She states that she has been wearing the wrist splints 23 hours of the day with very minimal improvement of symptoms.  Discussed ways to wean wrist splints to allow for increased strengthening of the wrist.  Patient verbalized her understanding.  She states she is taking ibuprofen with moderate relief, it helps but does not get rid of the pain entirely.  She tried the prednisone, states was only able to take it for 3 days due to side effects of jitteriness, inability to sleep, and feeling foggy.  She states she tried ice this only exacerbated symptoms.  She is tolerating light duty without difficulty.  Plan of care discussed with patient.     Objective Findings: Bilateral wrists: No obvious  deformities or discolorations noted.  Very mild TTP along right proximal extensor bundle, mild TTP along bilateral volar wrists. FROM. Median, radial, ulnar nerves intact on testing. Radial, ulnar pulses 2+ and symmetric, cap refill <2 sec Subjective numbness in the 2nd-4th digits in bilateral wrists.  Negative edema, erythema, bruising, or abnormal warmth in either wrists.  Tinel's and Finkelstein negative.  Phalen's positive.  Distal neurovascular strength intact.   strength 5/5.   Pre-Existing Condition(s):     Assessment:   Condition Worsened    Status: Additional Care Required  Permanent Disability:No    Plan: OT    Diagnostics:      Comments:  Follow-up in 3 weeks  Restricted duty  Hand therapy referral placed  Recommend continue OTC NSAIDs, ice, heat, warm Epson salt soaks, or OTC topical ointments i.e. Voltaren gel, icy hot, Tiger balm, or Biofreeze.  Recommend continue with wrist splint  s while at work and sleeping, wean at home as tolerated  Recommend continued range of motion and stretching exercises    Disability Information   Status: Released to Restricted Duty    From:  1/15/2021  Through: 2/4/2021 Restrictions are: Temporary   Physical Restrictions   Sitting:    Standing:    Stooping:    Bending:      Squatting:    Walking:    Climbing:    Pushing:      Pulling:    Other:    Reaching Above Shoulder (L):   Reaching Above Shoulder (R):       Reaching Below Shoulder (L):    Reaching Below Shoulder (R):      Not to exceed Weight Limits   Carrying(hrs):   Weight Limit(lb): < or = to 10 pounds  Comments:Right and left wrist only Lifting(hrs):   Weight  Limit(lb): < or = to 10 pounds  Comments:Right and left wrist only   Comments: Recommend continue with 5 hour shift with 10 pound lifting restrictions, then no lifting the rest of the shift    Repetitive Actions   Hands: i.e. Fine Manipulations from Grasping: < or = to 2 hrs/day  Comments:Right and left wrist only   Feet: i.e. Operating Foot Controls:       Driving / Operate Machinery:     Provider Name:   KARINE Manning Physician Signature:  Physician Name:     Clinic Name / Location: [address],   [address]  [place] [address] Clinic Phone Number: Dept: [phone]   Appointment Time: 1:00 Pm Visit Start Time: 1:00 PM   Check-In Time:  12:57 Pm Visit Discharge Time: 1:36 PM    Original-Treating Physician or Chiropractor    Page 2-Insurer/TPA    Page 3-Employer    Page 4-Employee

## 2021-01-15 NOTE — PROGRESS NOTES
"Subjective:     Alexus Saldaña is a 56 y.o. female who presents for Follow-Up (WC new2u 16/21 Lt hand, Rt hand & wrist, same, rm 16)      DOI: 1/6/21: SAMIR: Repetitive motion type injury from lifting heavy boxes.  Today no improvement, states feels worse.  Patient states that she has more numbness in digits 2-4 in both hands, numbness radiates from distal forearm into elbow bilaterally, she states she feels her  is weak, she is dropping things more frequently, and having some continued elbow pain bilaterally.  Patient has no pertinent negatives at this time.  She states that she has been wearing the wrist splints 23 hours of the day with very minimal improvement of symptoms.  Discussed ways to wean wrist splints to allow for increased strengthening of the wrist.  Patient verbalized her understanding.  She states she is taking ibuprofen with moderate relief, it helps but does not get rid of the pain entirely.  She tried the prednisone, states was only able to take it for 3 days due to side effects of jitteriness, inability to sleep, and feeling foggy.  She states she tried ice this only exacerbated symptoms.  She is tolerating light duty without difficulty.  Plan of care discussed with patient.      ROS: All systems were reviewed on intake form, form was reviewed and signed. See scanned documents in media. Pertinent positives and negatives included in HPI.    PMH: No pertinent past medical history to this problem  MEDS: Medications were reviewed in Epic  ALLERGIES: No Known Allergies  SOCHX: Works as  at [employer]   FH: No pertinent family history to this problem       Objective:     /84   Pulse 90   Temp 36.6 °C (97.9 °F)   Resp 18   Ht 1.575 m (5' 2\")   Wt 54.4 kg (120 lb)   LMP 04/01/2015   SpO2 96%   BMI 21.95 kg/m²     [unfilled]    Bilateral wrists: No obvious deformities or discolorations noted.  Very mild TTP along right proximal extensor bundle, mild TTP along " bilateral volar wrists. FROM. Median, radial, ulnar nerves intact on testing. Radial, ulnar pulses 2+ and symmetric, cap refill <2 sec Subjective numbness in the 2nd-4th digits in bilateral wrists.  Negative edema, erythema, bruising, or abnormal warmth in either wrists.  Tinel's and Finkelstein negative.  Phalen's positive.  Distal neurovascular strength intact.   strength 5/5.    Assessment/Plan:       1. Bilateral wrist pain  - REFERRAL TO OCCUPATIONAL THERAPY    2. Sprain of left wrist, subsequent encounter  - REFERRAL TO OCCUPATIONAL THERAPY    3. Sprain of right wrist, subsequent encounter  - REFERRAL TO OCCUPATIONAL THERAPY    Released to Restricted Duty FROM 1/15/2021 TO 2/4/2021  Recommend continue with 5 hour shift with 10 pound lifting restrictions, then no lifting the rest of the shift  Follow-up in 3 weeks  Restricted duty  Hand therapy referral placed  Recommend continue OTC NSAIDs, ice, heat, warm Epson salt soaks, or OTC topical ointments i.e. Voltaren gel, icy hot, Tiger balm, or Biofreeze.  Recommend continue with wrist splint  s while at work and sleeping, wean at home as tolerated  Recommend continued range of motion and stretching exercises    Differential diagnosis, natural history, supportive care, and indications for immediate follow-up discussed.    Approximately 35 minutes were spent in reviewing notes, preparing for visit, obtaining history, exam and evaluation, patient counseling/education and post visit documentation/orders.

## 2021-01-26 DIAGNOSIS — S63.502D SPRAIN OF LEFT WRIST, SUBSEQUENT ENCOUNTER: ICD-10-CM

## 2021-01-26 RX ORDER — METHYLPREDNISOLONE 4 MG/1
TABLET ORAL
Qty: 21 TAB | Refills: 0 | Status: SHIPPED | OUTPATIENT
Start: 2021-01-26 | End: 2021-08-04

## 2021-01-26 RX ORDER — DICLOFENAC SODIUM 75 MG/1
75 TABLET, DELAYED RELEASE ORAL 2 TIMES DAILY
Qty: 60 TAB | Refills: 0 | Status: SHIPPED | OUTPATIENT
Start: 2021-01-26 | End: 2021-03-24

## 2021-02-04 ENCOUNTER — OCCUPATIONAL MEDICINE (OUTPATIENT)
Dept: OCCUPATIONAL MEDICINE | Facility: CLINIC | Age: 56
End: 2021-02-04
Payer: COMMERCIAL

## 2021-02-04 VITALS
OXYGEN SATURATION: 95 % | WEIGHT: 120.3 LBS | RESPIRATION RATE: 20 BRPM | BODY MASS INDEX: 22.14 KG/M2 | HEIGHT: 62 IN | SYSTOLIC BLOOD PRESSURE: 160 MMHG | DIASTOLIC BLOOD PRESSURE: 88 MMHG | HEART RATE: 78 BPM

## 2021-02-04 DIAGNOSIS — S63.502D SPRAIN OF LEFT WRIST, SUBSEQUENT ENCOUNTER: ICD-10-CM

## 2021-02-04 DIAGNOSIS — S63.501D SPRAIN OF RIGHT WRIST, SUBSEQUENT ENCOUNTER: ICD-10-CM

## 2021-02-04 PROCEDURE — 99213 OFFICE O/P EST LOW 20 MIN: CPT | Performed by: NURSE PRACTITIONER

## 2021-02-04 NOTE — LETTER
[address],   [address] [place] [address]  Phone:  [phone] - Fax:  [phone]   Occupational Health [facility] Progress Report and Disability Certification  Date of Service: 2/4/2021   No Show:  No  Date / Time of Next Visit: 2/24/2021 @ 2:15pm   Claim Information   Patient Name: Alexus Saldaña  Claim Number:     Employer: [employer]  Date of Injury: 1/6/2021     Insurer / TPA: Jagdeep Moore  ID / SSN:     Occupation:   Diagnosis: Diagnoses of Sprain of left wrist, subsequent encounter and Sprain of right wrist, subsequent encounter were pertinent to this visit.    Medical Information   Related to Industrial Injury?   Comments:Indeterminate, no mechanism of injury    Subjective Complaints:  DOI: 1/6/21: SAMIR: Repetitive motion type injury from lifting heavy boxes. No improvement of symptoms. Pain is constant, triggered with gripping, aggravated by exercises, and intermittent numbness. She has no pertinent negatives at this time. She tried the diclofenac, Medrol, Ibuprofen, Voltaren Gel, and Epson Salt soaks, but everything only provides temporary relief then symptoms return. Diclofenac caused stomach upset. She had not scheduled hand therapy due to having some personal issues and misplaced the number. Referral information provided at this visit.  Patient states that she is wearing the splints at work and at night with very brief relief. She had to leave a shift after one hour states that the pain got severe. Plan of care discussed with patient.    Objective Findings: Bilateral wrists: No obvious deformities or discolorations noted.  Very mild TTP along right proximal extensor bundle, mild TTP along bilateral volar wrists. FROM. Median, radial, ulnar nerves intact on testing. Radial, ulnar pulses 2+ and symmetric, cap refill <2 sec Subjective numbness in the 2nd-4th digits in bilateral wrists.  Negative edema, erythema,  bruising, or abnormal warmth in either wrists.  Tinel's and Finkelstein negative.  Phalen's positive.  Distal neurovascular strength intact.   strength 5/5.   Pre-Existing Condition(s):     Assessment:   Condition Same    Status: Additional Care Required  Permanent Disability:No    Plan: Diagnostics    Diagnostics: EMG / NCS    Comments:  Follow-up in 3 weeks  Restricted duty  EMG/nerve conduction study ordered at this visit  Hand therapy appointments as scheduled   Recommend continue OTC NSAIDs, ice, heat, warm Epson salt soaks, or OTC topical ointments i.e. Voltaren gel, icy hot, Ti  vladimir balm, or Biofreeze.  Recommend continue with wrist splint's while at work and sleeping, wean at home as tolerated  Recommend continued range of motion and stretching exercises    Disability Information   Status: Released to Restricted Duty    From:  2/4/2021  Through: 2/24/2021 Restrictions are: Temporary   Physical Restrictions   Sitting:    Standing:    Stooping:    Bending:      Squatting:    Walking:    Climbing:    Pushing:      Pulling:    Other:    Reaching Above Shoulder (L):   Reaching Above Shoulder (R):       Reaching Below Shoulder (L):    Reaching Below Shoulder (R):      Not to exceed Weight Limits   Carrying(hrs):   Weight Limit(lb): < or = to 10 pounds  Comments:Right and left upper extremity only Lifting(hrs):   Weight  Limit(lb): < or = to 10 pounds  Comments:Right and left upper extremity only   Comments: Recommend continue with 5 hour shift with 10 pound lifting restrictions, then no lifting the rest of the shift    Repetitive Actions   Hands: i.e. Fine Manipulations from Grasping: < or = to 2 hrs/day  Comments:Right and left upper extremity only   Feet: i.e. Operating Foot Controls:     Driving / Operate Machinery:     Provider Name:   KARINE Manning Physician Signature:  Physician Name:     Clinic Name / Location: [address],   [address]  [place] [address]  Clinic Phone Number: Dept: [phone]   Appointment Time: 4:15 Pm Visit Start Time: 4:08 PM   Check-In Time:  4:01 Pm Visit Discharge Time:  4:50pm   Original-Treating Physician or Chiropractor    Page 2-Insurer/TPA    Page 3-Employer    Page 4-Employee

## 2021-02-05 NOTE — PROGRESS NOTES
"Subjective:     Alexus Saldaña is a 56 y.o. female who presents for Follow-Up (WC DOI 1/6/21 both hands/wrists, same, rm 18 )      DOI: 1/6/21: SAMIR: Repetitive motion type injury from lifting heavy boxes. No improvement of symptoms. Pain is constant, triggered with gripping, aggravated by exercises, and intermittent numbness. She has no pertinent negatives at this time. She tried the diclofenac, Medrol, Ibuprofen, Voltaren Gel, and Epson Salt soaks, but everything only provides temporary relief then symptoms return. Diclofenac caused stomach upset. She had not scheduled hand therapy due to having some personal issues and misplaced the number. Referral information provided at this visit.  Patient states that she is wearing the splints at work and at night with very brief relief. She had to leave a shift after one hour states that the pain got severe. Plan of care discussed with patient.     ROS: All systems were reviewed on intake form, form was reviewed and signed. See scanned documents in media. Pertinent positives and negatives included in HPI.    PMH: No pertinent past medical history to this problem  MEDS: Medications were reviewed in Epic  ALLERGIES: No Known Allergies  SOCHX: Works as  at [employer]  FH: No pertinent family history to this problem       Objective:     /88   Pulse 78   Resp 20   Ht 1.575 m (5' 2\")   Wt 54.6 kg (120 lb 4.8 oz)   LMP 04/01/2015   SpO2 95%   BMI 22.00 kg/m²     [unfilled]    Bilateral wrists: No obvious deformities or discolorations noted.  Very mild TTP along right proximal extensor bundle, mild TTP along bilateral volar wrists. FROM. Median, radial, ulnar nerves intact on testing. Radial, ulnar pulses 2+ and symmetric, cap refill <2 sec Subjective numbness in the 2nd-4th digits in bilateral wrists.  Negative edema, erythema, bruising, or abnormal warmth in either wrists.  Tinel's and Finkelstein negative.  Phalen's positive.  Distal " neurovascular strength intact.   strength 5/5.    Assessment/Plan:       1. Sprain of left wrist, subsequent encounter  - REFERRAL TO NEURODIAGNOSTICS (EEG,EP,EMG/NCS/DBS)    2. Sprain of right wrist, subsequent encounter  - REFERRAL TO NEURODIAGNOSTICS (EEG,EP,EMG/NCS/DBS)    Released to Restricted Duty FROM 2/4/2021 TO 2/24/2021  Recommend continue with 5 hour shift with 10 pound lifting restrictions, then no lifting the rest of the shift  Follow-up in 3 weeks  Restricted duty  EMG/nerve conduction study ordered at this visit  Hand therapy appointments as scheduled   Recommend continue OTC NSAIDs, ice, heat, warm Epson salt soaks, or OTC topical ointments i.e. Voltaren gel, icy hot, Ti  vladimir balm, or Biofreeze.  Recommend continue with wrist splint's while at work and sleeping, wean at home as tolerated  Recommend continued range of motion and stretching exercises    Differential diagnosis, natural history, supportive care, and indications for immediate follow-up discussed.    Approximately 25 minutes were spent in reviewing notes, preparing for visit, obtaining history, exam and evaluation, patient counseling/education and post visit documentation/orders.

## 2021-02-10 ENCOUNTER — OCCUPATIONAL MEDICINE (OUTPATIENT)
Dept: OCCUPATIONAL MEDICINE | Facility: CLINIC | Age: 56
End: 2021-02-10
Payer: COMMERCIAL

## 2021-02-10 ENCOUNTER — TELEPHONE (OUTPATIENT)
Dept: OCCUPATIONAL MEDICINE | Facility: CLINIC | Age: 56
End: 2021-02-10

## 2021-02-10 VITALS
BODY MASS INDEX: 22.45 KG/M2 | TEMPERATURE: 98.4 F | WEIGHT: 122 LBS | HEART RATE: 100 BPM | SYSTOLIC BLOOD PRESSURE: 140 MMHG | DIASTOLIC BLOOD PRESSURE: 70 MMHG | OXYGEN SATURATION: 95 % | HEIGHT: 62 IN

## 2021-02-10 DIAGNOSIS — S63.501D SPRAIN OF RIGHT WRIST, SUBSEQUENT ENCOUNTER: ICD-10-CM

## 2021-02-10 DIAGNOSIS — S63.502D SPRAIN OF LEFT WRIST, SUBSEQUENT ENCOUNTER: ICD-10-CM

## 2021-02-10 DIAGNOSIS — M25.532 BILATERAL WRIST PAIN: ICD-10-CM

## 2021-02-10 DIAGNOSIS — M25.531 BILATERAL WRIST PAIN: ICD-10-CM

## 2021-02-10 PROCEDURE — 99213 OFFICE O/P EST LOW 20 MIN: CPT | Performed by: NURSE PRACTITIONER

## 2021-02-10 ASSESSMENT — ENCOUNTER SYMPTOMS
MYALGIAS: 1
CARDIOVASCULAR NEGATIVE: 1
NEUROLOGICAL NEGATIVE: 1
PSYCHIATRIC NEGATIVE: 1
CONSTITUTIONAL NEGATIVE: 1
RESPIRATORY NEGATIVE: 1

## 2021-02-10 NOTE — LETTER
[address],   [address] [place] [address]  Phone:  [phone] - Fax:  [phone]   [facility] Progress Report and Disability Certification  Date of Service: 2/10/2021   No Show:  No  Date / Time of Next Visit:  Discharged/Care transfer to Physiatry    Claim Information   Patient Name: Alexus Saldaña  Claim Number:     Employer: [employer]  Date of Injury: 1/6/2021     Insurer / TPA: Jagdeep Moore  ID / SSN:     Occupation:   Diagnosis: Diagnoses of Sprain of left wrist, subsequent encounter, Sprain of right wrist, subsequent encounter, and Bilateral wrist pain were pertinent to this visit.    Medical Information   Related to Industrial Injury?   Comments:Indeterminant, no mechanism of injury     Subjective Complaints:  DOI: 1/6/21: SAMIR: Repetitive motion type injury from lifting heavy boxes. No improvement of symptoms. Pain is constant, triggered with gripping, aggravated by exercises, and intermittent numbness. She has no pertinent negatives at this time. She tried the diclofenac, Medrol, Ibuprofen, Voltaren Gel, and Epson Salt soaks, but everything only provides temporary relief then symptoms return. Diclofenac caused stomach upset. She is waiting for hand therapy appointments to be scheduled. Previous assigned hand therapy clinic out of network. Patient states that she is wearing the splints at work, but aggravating her wrists. Discussed wearing them at night only for sleeping. Here to address and update work restrictions.  EMG/NCS completed today, will request results. Preliminary results are neg for carpal tunnel. Physiatry referral placed at this visit. Plan of care discussed with patient.    Objective Findings:  Bilateral wrists: No obvious deformities or discolorations noted.  Very mild TTP along right proximal extensor bundle, mild TTP along bilateral volar wrists. FROM. Median, radial, ulnar nerves  intact on testing. Radial, ulnar pulses 2+ and symmetric, cap refill <2 sec Subjective numbness in the 2nd-4th digits in bilateral wrists.  Negative edema, erythema, bruising, or abnormal warmth in either wrists.  Tinel's and Finkelstein negative.  Phalen's positive.  Distal neurovascular strength intact.   strength 5/5.   Pre-Existing Condition(s):     Assessment:   Condition Same    Status: Discharged / Care Transfer  Permanent Disability:No    Plan: Transfer CareOT    Diagnostics:      Comments:  Follow-up in 3 weeks, if not seen by Physiatry   Restricted duty, physiatry  EMG/nerve conduction study completed 2/10/21 will request copy of results  Hand therapy appointments as scheduled   Recommend continue OTC NSAIDs, ice, heat, warm Epson salt   soaks, or OTC topical ointments i.e. Voltaren gel, icy hot, Tiger balm, or Biofreeze.   Recommend continue with wrist splint's while sleeping, wean at home as tolerated   Recommend continued range of motion and stretching exercises      Disability Information   Status: Released to Restricted Duty    From:  2/10/2021  Through:   Restrictions are: Temporary   Physical Restrictions   Sitting:    Standing:    Stooping:    Bending:      Squatting:    Walking:    Climbing:    Pushing:      Pulling:    Other:    Reaching Above Shoulder (L):   Reaching Above Shoulder (R):       Reaching Below Shoulder (L):    Reaching Below Shoulder (R):      Not to exceed Weight Limits   Carrying(hrs): 8 Weight Limit(lb): < or = to 10 pounds  Comments:Bilatral upper extremities   Lifting(hrs): 8 Weight  Limit(lb): < or = to 10 pounds  Comments:Bilatral upper extremities     Comments: Okay to work scheduled 8 hour shifts with restrictions in place    Repetitive Actions   Hands: i.e. Fine Manipulations from Grasping:     Feet: i.e. Operating Foot Controls:     Driving / Operate Machinery:     Provider Name:   KARINE Manning Physician Signature:  Physician Name:     Clinic Name /  Location: [address],   [address]  [place] [address] Clinic Phone Number: Dept: [phone]   Appointment Time: 11:00 Am Visit Start Time: 10:38 AM   Check-In Time:  10:35 Am Visit Discharge Time:  11:14am   Original-Treating Physician or Chiropractor    Page 2-Insurer/TPA    Page 3-Employer    Page 4-Employee

## 2021-02-10 NOTE — PROGRESS NOTES
"Subjective:     Alexus Saldaña is a 56 y.o. female who presents for Follow-Up (WC FV DOI 01/26/21, BOTH HANDS, FEEL SAME, WANTS TO TALK ABOUT WORK RESTRICTIONS, RM 23)      DOI: 1/6/21: SAMIR: Repetitive motion type injury from lifting heavy boxes. No improvement of symptoms. Pain is constant, triggered with gripping, aggravated by exercises, and intermittent numbness. She has no pertinent negatives at this time. She tried the diclofenac, Medrol, Ibuprofen, Voltaren Gel, and Epson Salt soaks, but everything only provides temporary relief then symptoms return. Diclofenac caused stomach upset. She is waiting for hand therapy appointments to be scheduled. Previous assigned hand therapy clinic out of network. Patient states that she is wearing the splints at work, but aggravating her wrists. Discussed wearing them at night only for sleeping. Here to address and update work restrictions.  EMG/NCS completed today, will request results. Preliminary results are neg for carpal tunnel. Physiatry referral placed at this visit. Plan of care discussed with patient.     Review of Systems   Constitutional: Negative.    Respiratory: Negative.    Cardiovascular: Negative.    Musculoskeletal: Positive for joint pain and myalgias.   Skin: Negative.    Neurological: Negative.    Psychiatric/Behavioral: Negative.        SOCHX: Works as  at [employer]  FH: No pertinent family history to this problem       Objective:     /70 (BP Location: Left arm, Patient Position: Sitting)   Pulse 100   Temp 36.9 °C (98.4 °F) (Temporal)   Ht 1.575 m (5' 2\")   Wt 55.3 kg (122 lb)   LMP 04/01/2015   SpO2 95%   BMI 22.31 kg/m²     Constitutional: Patient is in no acute distress. Appears well-developed and well-nourished.   Cardiovascular: Normal rate.    Pulmonary/Chest: Effort normal. No respiratory distress.   Neurological: Patient is alert and oriented to person, place, and time.   Skin: Skin is warm and dry. "   Psychiatric: Normal mood and affect. Behavior is normal.      Bilateral wrists: No obvious deformities or discolorations noted.  Very mild TTP along right proximal extensor bundle, mild TTP along bilateral volar wrists. FROM. Median, radial, ulnar nerves intact on testing. Radial, ulnar pulses 2+ and symmetric, cap refill <2 sec Subjective numbness in the 2nd-4th digits in bilateral wrists.  Negative edema, erythema, bruising, or abnormal warmth in either wrists.  Tinel's and Finkelstein negative.  Phalen's positive.  Distal neurovascular strength intact.   strength 5/5.    Assessment/Plan:       1. Sprain of left wrist, subsequent encounter  - REFERRAL TO PHYSIATRY (PMR)    2. Sprain of right wrist, subsequent encounter  - REFERRAL TO PHYSIATRY (PMR)    3. Bilateral wrist pain  - REFERRAL TO PHYSIATRY (PMR)    Released to Restricted Duty FROM 2/10/2021 TO    [employer] to work scheduled 8 hour shifts with restrictions in place    Follow-up in 3 weeks, if not seen by Physiatry   Restricted duty, physiatry  EMG/nerve conduction study completed 2/10/21 will request copy of results  Hand therapy appointments as scheduled   Recommend continue OTC NSAIDs, ice, heat, warm Epson salt   soaks, or OTC topical ointments i.e. Voltaren gel, icy hot, Tiger balm, or Biofreeze.   Recommend continue with wrist splint's while sleeping, wean at home as tolerated   Recommend continued range of motion and stretching exercises      Differential diagnosis, natural history, supportive care, and indications for immediate follow-up discussed.    Approximately 25 minutes was spent in preparing for visit, obtaining history, exam and evaluation, patient counseling/education and post visit documentation/orders.

## 2021-02-10 NOTE — TELEPHONE ENCOUNTER
I have requested report from [facility] for NCS/EMG.  Report will be faxed to [facility] after the results have been read.     Thank you.

## 2021-03-02 ENCOUNTER — OCCUPATIONAL MEDICINE (OUTPATIENT)
Dept: OCCUPATIONAL MEDICINE | Facility: CLINIC | Age: 56
End: 2021-03-02
Payer: COMMERCIAL

## 2021-03-02 VITALS
WEIGHT: 119 LBS | HEIGHT: 62 IN | BODY MASS INDEX: 21.9 KG/M2 | HEART RATE: 93 BPM | TEMPERATURE: 97.9 F | DIASTOLIC BLOOD PRESSURE: 82 MMHG | SYSTOLIC BLOOD PRESSURE: 124 MMHG | OXYGEN SATURATION: 96 %

## 2021-03-02 DIAGNOSIS — S63.501D SPRAIN OF RIGHT WRIST, SUBSEQUENT ENCOUNTER: ICD-10-CM

## 2021-03-02 DIAGNOSIS — M25.532 BILATERAL WRIST PAIN: ICD-10-CM

## 2021-03-02 DIAGNOSIS — S63.502D SPRAIN OF LEFT WRIST, SUBSEQUENT ENCOUNTER: ICD-10-CM

## 2021-03-02 DIAGNOSIS — M25.531 BILATERAL WRIST PAIN: ICD-10-CM

## 2021-03-02 PROCEDURE — 99213 OFFICE O/P EST LOW 20 MIN: CPT | Performed by: NURSE PRACTITIONER

## 2021-03-02 ASSESSMENT — ENCOUNTER SYMPTOMS
RESPIRATORY NEGATIVE: 1
WEAKNESS: 1
SENSORY CHANGE: 1
MYALGIAS: 1
CONSTITUTIONAL NEGATIVE: 1
TINGLING: 1
PSYCHIATRIC NEGATIVE: 1
CARDIOVASCULAR NEGATIVE: 1

## 2021-03-02 NOTE — PROGRESS NOTES
"Subjective:     Alexus Saldaña is a 56 y.o. female who presents for Other (WC DOI WC FV DOI 01/26/21, BOTH HANDS, FEEL SAME, RM 16)      DOI: 1/6/21: SAMIR: Repetitive motion type injury from lifting heavy boxes. Very mild improvement of symptoms. Pain is intermittent in nature,  triggered with gripping, aggravated by exercises, and intermittent numbness. She has no pertinent negatives at this time. She tried the diclofenac, Medrol, Ibuprofen, Voltaren Gel, and Epson Salt soaks, but everything only provides temporary relief then symptoms return. She states that lately she is finding more relief from the Epson salt soaks.  She is starting hand therapy.  Patient states that she has weaned the splints, she continues to wear at night intermittently.  EMG/NCS neg for carpal tunnel. Physiatry appointment pending. Plan of care discussed with patient.     Review of Systems   Constitutional: Negative.    Respiratory: Negative.    Cardiovascular: Negative.    Musculoskeletal: Positive for joint pain and myalgias.   Skin:        Occasional swelling with use in both wrisits   Neurological: Positive for tingling, sensory change and weakness.   Psychiatric/Behavioral: Negative.        SOCHX: Works as  at [employer]  FH: No pertinent family history to this problem     Objective:     /82   Pulse 93   Temp 36.6 °C (97.9 °F)   Ht 1.575 m (5' 2\")   Wt 54 kg (119 lb)   LMP 04/01/2015   SpO2 96%   BMI 21.77 kg/m²     Constitutional: Patient is in no acute distress. Appears well-developed and well-nourished.   Cardiovascular: Normal rate.    Pulmonary/Chest: Effort normal. No respiratory distress.   Neurological: Patient is alert and oriented to person, place, and time.   Skin: Skin is warm and dry.   Psychiatric: Normal mood and affect. Behavior is normal.     Bilateral wrists: No obvious deformities or discolorations noted.  Very mild TTP along right proximal extensor bundle, mild TTP along " bilateral volar wrists. FROM. Median, radial, ulnar nerves intact on testing. Radial, ulnar pulses 2+ and symmetric, cap refill <2 sec Subjective numbness in the 2nd-4th digits in bilateral wrists.  Negative edema, erythema, bruising, or abnormal warmth in either wrists.  Tinel's and Finkelstein negative.  Phalen's positive.  Distal neurovascular strength intact.   strength 5/5.    Assessment/Plan:       1. Sprain of left wrist, subsequent encounter    2. Sprain of right wrist, subsequent encounter    3. Bilateral wrist pain    Released to Restricted Duty FROM 3/2/2021 TO 3/24/2021  Okay to work scheduled 8 hour shifts for 4 days with restrictions in place    Follow-up in 3 weeks, if not seen by Physiatry   Restricted duty, physiatry   Physiatry referral authorized, pending scheduling  Hand therapy appointments as scheduled   Recommend continue OTC NSAIDs, ice, heat, warm Epson salt soaks, or OTC topical   ointments i.e. Voltaren gel, icy hot, Tiger balm, or Biofreeze.   Recommend continue with weaning wrist splint's  Recommend continued range of motion and stretching exercises       Differential diagnosis, natural history, supportive care, and indications for immediate follow-up discussed.    Approximately 25 minutes was spent in preparing for visit, obtaining history, exam and evaluation, patient counseling/education and post visit documentation/orders.

## 2021-03-02 NOTE — LETTER
[address],   [address] [place] [address]  Phone:  [phone] - Fax:  [phone]   Occupational Health [facility] Progress Report and Disability Certification  Date of Service: 3/2/2021   No Show:  No  Date / Time of Next Visit: 3/24/2021 @ 10:15am   Claim Information   Patient Name: Alexus Saldaña  Claim Number:     Employer: [employer]  Date of Injury: 1/6/2021     Insurer / TPA: Jagdeep Moore  ID / SSN:     Occupation:   Diagnosis: Diagnoses of Sprain of left wrist, subsequent encounter, Sprain of right wrist, subsequent encounter, and Bilateral wrist pain were pertinent to this visit.    Medical Information   Related to Industrial Injury?   Comments:Indeterminant, no mechanism of injury    Subjective Complaints:  DOI: 1/6/21: SAMIR: Repetitive motion type injury from lifting heavy boxes. Very mild improvement of symptoms. Pain is intermittent in nature,  triggered with gripping, aggravated by exercises, and intermittent numbness. She has no pertinent negatives at this time. She tried the diclofenac, Medrol, Ibuprofen, Voltaren Gel, and Epson Salt soaks, but everything only provides temporary relief then symptoms return. She states that lately she is finding more relief from the Epson salt soaks.  She is starting hand therapy.  Patient states that she has weaned the splints, she continues to wear at night intermittently.  EMG/NCS neg for carpal tunnel. Physiatry appointment pending. Plan of care discussed with patient.    Objective Findings: Bilateral wrists: No obvious deformities or discolorations noted.  Very mild TTP along right proximal extensor bundle, mild TTP along bilateral volar wrists. FROM. Median, radial, ulnar nerves intact on testing. Radial, ulnar pulses 2+ and symmetric, cap refill <2 sec Subjective numbness in the 2nd-4th digits in bilateral wrists.  Negative edema, erythema, bruising, or abnormal warmth in  either wrists.  Tinel's and Finkelstein negative.  Phalen's positive.  Distal neurovascular strength intact.   strength 5/5.   Pre-Existing Condition(s):     Assessment:   Condition Improved    Status: Discharged / Care Transfer  Permanent Disability:No    Plan: OTTransfer Care    Diagnostics:      Comments:  Follow-up in 3 weeks, if not seen by Physiatry   Restricted duty, physiatry   Physiatry referral authorized, pending scheduling  Hand therapy appointments as scheduled   Recommend continue OTC NSAIDs, ice, heat, warm Epson salt soaks, or OTC topical   ointments i.e. Voltaren gel, icy hot, Tiger balm, or Biofreeze.   Recommend continue with weaning wrist splint's  Recommend continued range of motion and stretching exercises       Disability Information   Status: Released to Restricted Duty    From:  3/2/2021  Through: 3/24/2021 Restrictions are: Temporary   Physical Restrictions   Sitting:    Standing:    Stooping:    Bending:      Squatting:    Walking:    Climbing:    Pushing:      Pulling:    Other:    Reaching Above Shoulder (L):   Reaching Above Shoulder (R):       Reaching Below Shoulder (L):    Reaching Below Shoulder (R):      Not to exceed Weight Limits   Carrying(hrs): 8 Weight Limit(lb): < or = to 10 pounds  Comments:Bilatral upper extremities   Lifting(hrs): 8 Weight  Limit(lb): < or = to 10 pounds  Comments:Bilatral upper extremities     Comments: Okay to work scheduled 8 hour shifts for 4 days with restrictions in place    Repetitive Actions   Hands: i.e. Fine Manipulations from Grasping:     Feet: i.e. Operating Foot Controls:     Driving / Operate Machinery:     Provider Name:   KARINE Manning Physician Signature:  Physician Name:     Clinic Name / Location: [address],   [address]  [place] [address] Clinic Phone Number: Dept: [phone]   Appointment Time: 1:45 Pm Visit Start Time: 9:25 AM   Check-In Time:  9:25 Am Visit Discharge Time:   10:15am   Original-Treating Physician or Chiropractor    Page 2-Insurer/TPA    Page 3-Employer    Page 4-Employee

## 2021-03-18 DIAGNOSIS — S63.502D SPRAIN OF LEFT WRIST, SUBSEQUENT ENCOUNTER: ICD-10-CM

## 2021-03-18 DIAGNOSIS — S63.501D SPRAIN OF RIGHT WRIST, SUBSEQUENT ENCOUNTER: ICD-10-CM

## 2021-03-18 DIAGNOSIS — M25.532 BILATERAL WRIST PAIN: ICD-10-CM

## 2021-03-18 DIAGNOSIS — M25.531 BILATERAL WRIST PAIN: ICD-10-CM

## 2021-03-24 ENCOUNTER — OCCUPATIONAL MEDICINE (OUTPATIENT)
Dept: OCCUPATIONAL MEDICINE | Facility: CLINIC | Age: 56
End: 2021-03-24
Payer: COMMERCIAL

## 2021-03-24 VITALS
HEIGHT: 62 IN | SYSTOLIC BLOOD PRESSURE: 130 MMHG | HEART RATE: 96 BPM | WEIGHT: 119 LBS | DIASTOLIC BLOOD PRESSURE: 90 MMHG | BODY MASS INDEX: 21.9 KG/M2 | OXYGEN SATURATION: 98 % | TEMPERATURE: 97.1 F

## 2021-03-24 DIAGNOSIS — S63.501D SPRAIN OF RIGHT WRIST, SUBSEQUENT ENCOUNTER: ICD-10-CM

## 2021-03-24 DIAGNOSIS — M25.531 BILATERAL WRIST PAIN: ICD-10-CM

## 2021-03-24 DIAGNOSIS — M25.532 BILATERAL WRIST PAIN: ICD-10-CM

## 2021-03-24 DIAGNOSIS — S63.502D SPRAIN OF LEFT WRIST, SUBSEQUENT ENCOUNTER: ICD-10-CM

## 2021-03-24 PROCEDURE — 99213 OFFICE O/P EST LOW 20 MIN: CPT | Performed by: NURSE PRACTITIONER

## 2021-03-24 RX ORDER — DICLOFENAC POTASSIUM 50 MG/1
TABLET, FILM COATED ORAL
COMMUNITY
Start: 2021-02-17 | End: 2021-03-24

## 2021-03-24 RX ORDER — IBUPROFEN 200 MG
200 TABLET ORAL EVERY 6 HOURS PRN
COMMUNITY
End: 2021-12-15

## 2021-03-24 RX ORDER — KETOROLAC TROMETHAMINE 30 MG/ML
60 INJECTION, SOLUTION INTRAMUSCULAR; INTRAVENOUS ONCE
Status: COMPLETED | OUTPATIENT
Start: 2021-03-24 | End: 2021-03-24

## 2021-03-24 RX ADMIN — KETOROLAC TROMETHAMINE 60 MG: 30 INJECTION, SOLUTION INTRAMUSCULAR; INTRAVENOUS at 10:49

## 2021-03-24 ASSESSMENT — PAIN SCALES - GENERAL: PAINLEVEL: 3=SLIGHT PAIN

## 2021-03-24 ASSESSMENT — ENCOUNTER SYMPTOMS
TINGLING: 1
MYALGIAS: 1
CARDIOVASCULAR NEGATIVE: 1
PSYCHIATRIC NEGATIVE: 1
SENSORY CHANGE: 1
RESPIRATORY NEGATIVE: 1
CONSTITUTIONAL NEGATIVE: 1
WEAKNESS: 1

## 2021-03-24 NOTE — PROGRESS NOTES
Subjective:     Alexus Saldaña is a 56 y.o. female who presents for Follow-Up (wc fv doi 1/6/21, left wrist, feels worse, rm 16)      DOI: 1/6/21: SAMIR: Repetitive motion type injury from lifting heavy boxes. Overall symptoms remain unchanged. Pain is now constant in nature,  triggered with gripping, aggravated by exercises, and intermittent numbness. She has no pertinent negatives at this time. She has no improvement with diclofenac, Medrol, Ibuprofen, Voltaren Gel, and Epson Salt soaks, but everything only provides temporary relief.  She continues to find the most relief from the Epson salt soaks.  She is states that hand therapy is not been helpful, just continues aggravate symptoms.  Patient states that she has not been able weaned the splints, when she does wear them at night they aggravate her wrist.   EMG/NCS neg for carpal tunnel. Physiatry has not been scheduled, per patient  states she cannot find a physiatrist willing to see her.  Therefore patient's referral has been sent to orthopedics.  Patient has plateaued in all conservative treatment at this time.  Pain management referral being placed for further evaluation and management.  Patient provided Toradol injection at this visit.  She is tolerating light duty with difficulty.  She strongly feels that she she feels that is unable to do her job even with the restrictions in place.  Due to the plateau in improvement, she states her employer told her they are not unable to accommodate her.  If she could just be off work she would improve with rest.  Discussed that restrictions are very strict and complete disability is not indicated.  Patient verbalized her understanding.  Plan of care discussed with patient.     Review of Systems   Constitutional: Negative.    Respiratory: Negative.    Cardiovascular: Negative.    Musculoskeletal: Positive for joint pain and myalgias.   Skin: Negative.    Neurological: Positive for tingling, sensory change and  "weakness.   Psychiatric/Behavioral: Negative.        SOCHX: Works as a  at [employer]   FH: No pertinent family history to this problem.       Objective:     /90 (BP Location: Right arm, Patient Position: Sitting)   Pulse 96   Temp 36.2 °C (97.1 °F) (Temporal)   Ht 1.575 m (5' 2\")   Wt 54 kg (119 lb)   LMP 04/01/2015   SpO2 98%   BMI 21.77 kg/m²     Constitutional: Patient is in no acute distress. Appears well-developed and well-nourished.   Cardiovascular: Normal rate.    Pulmonary/Chest: Effort normal. No respiratory distress.   Neurological: Patient is alert and oriented to person, place, and time.   Skin: Skin is warm and dry.   Psychiatric: Normal mood and affect. Behavior is normal.     Bilateral wrists: No obvious deformities or discolorations noted.  Very mild TTP along right proximal extensor bundle, mild TTP along bilateral volar wrists. FROM. Median, radial, ulnar nerves intact on testing. Radial, ulnar pulses 2+ and symmetric, cap refill <2 sec Subjective numbness in the 2nd-4th digits in bilateral wrists.  Negative edema, erythema, bruising, or abnormal warmth in either wrists.  Tinel's and Finkelstein negative.  Phalen's positive.  Distal neurovascular strength intact.   strength 5/5.    Assessment/Plan:       1. Sprain of left wrist, subsequent encounter  - REFERRAL TO PAIN MANAGEMENT    2. Sprain of right wrist, subsequent encounter  - REFERRAL TO PAIN MANAGEMENT    3. Bilateral wrist pain  - REFERRAL TO PAIN MANAGEMENT    Other orders  - ibuprofen (IBU-200) 200 MG Tab; Take 200 mg by mouth every 6 hours as needed.  - ketorolac (TORADOL) injection 60 mg    Released to Restricted Duty FROM 3/24/2021 TO    Okay to work scheduled 8 hour shifts for 4 days with restrictions in place.  Recommend rotating fine grasping manipulations every 2 hours with a 10-minute break in between as needed for comfort    Follow-up in 4 weeks, if not seen by pain " management  Restricted duty, pain management  Pain management referral placed, transfer care  Physiatry referral authorized, pending scheduling   Hand therapy appointments as scheduled   Recommend continue OT  C NSAIDs, ice, heat, warm Epson salt soaks, or OTC topical ointments i.e. Voltaren gel, icy hot, Tiger balm, or Biofreeze.   Recommend continue to wean wrist splint's okay to wear at work if needed  Recommend continued range of motion and stretching   exercises       Differential diagnosis, natural history, supportive care, and indications for immediate follow-up discussed.    Approximately 25 minutes was spent in preparing for visit, obtaining history, exam and evaluation, patient counseling/education and post visit documentation/orders.

## 2021-03-24 NOTE — LETTER
[address],   [address] [place] [address]  Phone:  [phone] - Fax:  [phone]   [facility] Progress Report and Disability Certification  Date of Service: 3/24/2021   No Show:  No  Date / Time of Next Visit:  Discharge/care transfer to pain management   Claim Information   Patient Name: Alexus Saldaña  Claim Number:     Employer: [employer]  Date of Injury:      Insurer / TPA: Jagdeep Moore  ID / SSN:     Occupation:   Diagnosis: Diagnoses of Sprain of left wrist, subsequent encounter, Sprain of right wrist, subsequent encounter, and Bilateral wrist pain were pertinent to this visit.    Medical Information   Related to Industrial Injury?   Comments:Indeterminant, no mechanism of injury     Subjective Complaints:  DOI: 1/6/21: SAMIR: Repetitive motion type injury from lifting heavy boxes. Overall symptoms remain unchanged. Pain is now constant in nature,  triggered with gripping, aggravated by exercises, and intermittent numbness. She has no pertinent negatives at this time. She has no improvement with diclofenac, Medrol, Ibuprofen, Voltaren Gel, and Epson Salt soaks, but everything only provides temporary relief.  She continues to find the most relief from the Epson salt soaks.  She is states that hand therapy is not been helpful, just continues aggravate symptoms.  Patient states that she has not been able weaned the splints, when she does wear them at night they aggravate her wrist.   EMG/NCS neg for carpal tunnel. Physiatry has not been scheduled, per patient  states she cannot find a physiatrist willing to see her.  Therefore patient's referral has been sent to orthopedics.  Patient has plateaued in all conservative treatment at this time.  Pain management referral being placed for further evaluation and management.  Patient provided Toradol injection at this visit.  She is tolerating light duty with difficulty.   She strongly feels that she she feels that is unable to do her job even with the restrictions in place.  Due to the plateau in improvement, she states her employer told her they are not unable to accommodate her.  If she could just be off work she would improve with rest.  Discussed that restrictions are very strict and complete disability is not indicated.  Patient verbalized her understanding.  Plan of care discussed with patient.    Objective Findings: Bilateral wrists: No obvious deformities or discolorations noted.  Very mild TTP along right proximal extensor bundle, mild TTP along bilateral volar wrists. FROM. Median, radial, ulnar nerves intact on testing. Radial, ulnar pulses 2+ and symmetric, cap refill <2 sec Subjective numbness in the 2nd-4th digits in bilateral wrists.  Negative edema, erythema, bruising, or abnormal warmth in either wrists.  Tinel's and Finkelstein negative.  Phalen's positive.  Distal neurovascular strength intact.   strength 5/5.   Pre-Existing Condition(s):     Assessment:   Condition Same    Status: Discharged / Care Transfer  Permanent Disability:No    Plan: Transfer CareMedication  Comments:IM Toradol injection given at this visit.    Diagnostics:   Comments:EMG/NCS negative for carpal tunnel     Comments:  Follow-up in 4 weeks, if not seen by pain management  Restricted duty, pain management  Pain management referral placed, transfer care  Physiatry referral authorized, pending scheduling   Hand therapy appointments as scheduled   Recommend continue OT  C NSAIDs, ice, heat, warm Epson salt soaks, or OTC topical ointments i.e. Voltaren gel, icy hot, Tiger balm, or Biofreeze.   Recommend continue to wean wrist splint's okay to wear at work if needed  Recommend continued range of motion and stretching   exercises       Disability Information   Status: Released to Restricted Duty    From:  3/24/2021  Through:   Restrictions are: Temporary   Physical Restrictions   Sitting:     Standing:    Stooping:    Bending:      Squatting:    Walking:    Climbing:    Pushing:      Pulling:    Other:    Reaching Above Shoulder (L):   Reaching Above Shoulder (R):       Reaching Below Shoulder (L):    Reaching Below Shoulder (R):      Not to exceed Weight Limits   Carrying(hrs): 8 Weight Limit(lb): < or = to 10 pounds  Comments:Bilateral upper extremities Lifting(hrs): 8 Weight  Limit(lb): < or = to 10 pounds  Comments:Bilateral upper extremities   Comments: Okay to work scheduled 8 hour shifts for 4 days with restrictions in place.  Recommend rotating fine grasping manipulations every 2 hours with a 10-minute break in between as needed for comfort    Repetitive Actions   Hands: i.e. Fine Manipulations from Grasping: < or = to 2 hrs/day  Comments:Bilateral or right upper extremities with 10-minute break every 2 hours   Feet: i.e. Operating Foot Controls:     Driving / Operate Machinery:     Provider Name:   KARINE Manning Physician Signature:  Physician Name:     Clinic Name / Location: [address] [address] Clinic Phone Number: Dept: [phone]   Appointment Time: 10:15 Am Visit Start Time: 10:15 AM   Check-In Time:  10:14 Am Visit Discharge Time:  10:52AM   Original-Treating Physician or Chiropractor    Page 2-Insurer/TPA    Page 3-Employer    Page 4-Employee

## 2021-04-06 ENCOUNTER — OFFICE VISIT (OUTPATIENT)
Dept: URGENT CARE | Facility: PHYSICIAN GROUP | Age: 56
End: 2021-04-06
Payer: COMMERCIAL

## 2021-04-06 VITALS
OXYGEN SATURATION: 98 % | HEIGHT: 62 IN | SYSTOLIC BLOOD PRESSURE: 132 MMHG | DIASTOLIC BLOOD PRESSURE: 80 MMHG | HEART RATE: 85 BPM | TEMPERATURE: 97.5 F | WEIGHT: 119 LBS | BODY MASS INDEX: 21.9 KG/M2 | RESPIRATION RATE: 16 BRPM

## 2021-04-06 DIAGNOSIS — J44.1 COPD EXACERBATION (HCC): ICD-10-CM

## 2021-04-06 DIAGNOSIS — R05.9 COUGH: ICD-10-CM

## 2021-04-06 DIAGNOSIS — J01.20 ACUTE ETHMOIDAL SINUSITIS, RECURRENCE NOT SPECIFIED: ICD-10-CM

## 2021-04-06 PROCEDURE — 99214 OFFICE O/P EST MOD 30 MIN: CPT | Performed by: PHYSICIAN ASSISTANT

## 2021-04-06 RX ORDER — DEXTROMETHORPHAN HYDROBROMIDE AND PROMETHAZINE HYDROCHLORIDE 15; 6.25 MG/5ML; MG/5ML
5 SYRUP ORAL EVERY 4 HOURS PRN
Qty: 120 ML | Refills: 0 | Status: SHIPPED | OUTPATIENT
Start: 2021-04-06 | End: 2021-08-04

## 2021-04-06 RX ORDER — AMOXICILLIN AND CLAVULANATE POTASSIUM 875; 125 MG/1; MG/1
1 TABLET, FILM COATED ORAL 2 TIMES DAILY
Qty: 20 TABLET | Refills: 0 | Status: SHIPPED | OUTPATIENT
Start: 2021-04-06 | End: 2021-08-04

## 2021-04-06 ASSESSMENT — ENCOUNTER SYMPTOMS
DIARRHEA: 0
SHORTNESS OF BREATH: 0
CHILLS: 0
VOMITING: 0
SPUTUM PRODUCTION: 0
FEVER: 0
SINUS PAIN: 1
ABDOMINAL PAIN: 0
WHEEZING: 0
NAUSEA: 0
SORE THROAT: 0
COUGH: 1

## 2021-04-06 NOTE — PROGRESS NOTES
"Subjective:   Alexus Saladña  is a 56 y.o. female who presents for Nasal Congestion (mucus stuck in throat, ears are ringing, x4 days )      HPI    Patient comes clinic concern for sinusitis.  She notes last 4 to 6 days of increasing sinus pressure pain, fullness and ringing in her ears as well as purulent nasal drainage.  She notes chronic seasonal allergic rhinitis for which she takes daily antihistamines and has been doing so long-term.  She denies use of nasal sprays in the \"do not work for her\".  She denies fevers chills.  She notes cough is dry with some wheezing consistent with her history of COPD.  She states she has had increasing in cough recently.  She denies regular use of MDIs but states she does only when her COPD has flared.  She does have past medical history of pneumonia over this last 1 year without hospitalization.  She denies nausea vomiting abdominal pain diarrhea or rash.  Denies loss of taste or smell.  Has been compliant with OTC antihistamines.     Review of Systems   Constitutional: Negative for chills and fever.   HENT: Positive for congestion, sinus pain and tinnitus. Negative for ear pain and sore throat.    Respiratory: Positive for cough. Negative for sputum production, shortness of breath and wheezing.    Gastrointestinal: Negative for abdominal pain, diarrhea, nausea and vomiting.   Skin: Negative for rash.   Endo/Heme/Allergies: Positive for environmental allergies.       No Known Allergies     Objective:   /80 (BP Location: Right arm, Patient Position: Sitting, BP Cuff Size: Adult)   Pulse 85   Temp 36.4 °C (97.5 °F) (Temporal)   Resp 16   Ht 1.575 m (5' 2\")   Wt 54 kg (119 lb)   LMP 04/01/2015   SpO2 98%   BMI 21.77 kg/m²     Physical Exam  Vitals and nursing note reviewed.   Constitutional:       General: She is not in acute distress.     Appearance: She is well-developed. She is not diaphoretic.   HENT:      Head: Normocephalic and atraumatic.      Right Ear: " Ear canal and external ear normal. Tympanic membrane is bulging. Tympanic membrane is not erythematous.      Left Ear: Ear canal and external ear normal. Tympanic membrane is bulging. Tympanic membrane is not erythematous.      Nose: Nose normal.      Right Sinus: No maxillary sinus tenderness or frontal sinus tenderness.      Left Sinus: No maxillary sinus tenderness or frontal sinus tenderness.      Comments: Ethmoid tenderness to palpation     Mouth/Throat:      Pharynx: Uvula midline. Posterior oropharyngeal erythema ( PND) present. No oropharyngeal exudate.      Tonsils: No tonsillar abscesses.   Eyes:      General: Lids are normal. No scleral icterus.        Right eye: No discharge.         Left eye: No discharge.      Conjunctiva/sclera: Conjunctivae normal.   Pulmonary:      Effort: Pulmonary effort is normal. No accessory muscle usage or respiratory distress.      Breath sounds: Normal breath sounds. No decreased breath sounds, wheezing, rhonchi or rales.      Comments: Distant but present lung sounds c/w copd  Musculoskeletal:         General: Normal range of motion.      Cervical back: Neck supple.   Lymphadenopathy:      Cervical: Cervical adenopathy ( mild bilat) present.   Skin:     General: Skin is warm and dry.      Coloration: Skin is not pale.      Findings: No erythema.   Neurological:      Mental Status: She is alert and oriented to person, place, and time. She is not disoriented.   Psychiatric:         Speech: Speech normal.         Behavior: Behavior normal.         Assessment/Plan:   1. Acute ethmoidal sinusitis, recurrence not specified  - amoxicillin-clavulanate (AUGMENTIN) 875-125 MG Tab; Take 1 tablet by mouth 2 times a day.  Dispense: 20 tablet; Refill: 0    2. Cough  - promethazine-dextromethorphan (PROMETHAZINE-DM) 6.25-15 MG/5ML syrup; Take 5 mL by mouth every four hours as needed for Cough.  Dispense: 120 mL; Refill: 0    3. COPD exacerbation (HCC)  Supportive care is reviewed with  patient/caregiver - recommend to push PO fluids and electrolytes, Nsaids/tylenol, netti pot/saline irrig, humidifier in home, flonase, ponaris, antihistamine, Cautioned regarding potential for sedation with medication.   take full course of Rx, take with probiotics, observe for resolution  Return to clinic with lack of resolution or progression of symptoms.  Continue MDIs (patient has at home) as needed for COPD exacerbation    I have worn an N95 mask, gloves and eye protection for the entire encounter with this patient.     Differential diagnosis, natural history, supportive care, and indications for immediate follow-up discussed.

## 2021-04-06 NOTE — LETTER
April 6, 2021       Patient: Alexus Saldaña   YOB: 1965   Date of Visit: 4/6/2021         To Whom It May Concern:    In my medical opinion, I recommend that Alexus Saldaña should be excused from work for today.      If you have any questions or concerns, please don't hesitate to call [phone]          Sincerely,          Jas Olvera P.A.-C.  Electronically Signed

## 2021-08-04 ENCOUNTER — OFFICE VISIT (OUTPATIENT)
Dept: URGENT CARE | Facility: CLINIC | Age: 56
End: 2021-08-04
Payer: COMMERCIAL

## 2021-08-04 ENCOUNTER — APPOINTMENT (OUTPATIENT)
Dept: RADIOLOGY | Facility: IMAGING CENTER | Age: 56
End: 2021-08-04
Attending: PHYSICIAN ASSISTANT
Payer: COMMERCIAL

## 2021-08-04 VITALS
BODY MASS INDEX: 22.73 KG/M2 | RESPIRATION RATE: 14 BRPM | WEIGHT: 123.5 LBS | DIASTOLIC BLOOD PRESSURE: 88 MMHG | SYSTOLIC BLOOD PRESSURE: 124 MMHG | HEIGHT: 62 IN | OXYGEN SATURATION: 98 % | TEMPERATURE: 97.5 F | HEART RATE: 102 BPM
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
## 2021-08-04 DIAGNOSIS — M25.462 BILATERAL KNEE SWELLING: ICD-10-CM

## 2021-08-04 DIAGNOSIS — S80.01XA CONTUSION OF RIGHT KNEE, INITIAL ENCOUNTER: ICD-10-CM

## 2021-08-04 DIAGNOSIS — M25.461 BILATERAL KNEE SWELLING: ICD-10-CM

## 2021-08-04 DIAGNOSIS — S80.02XA CONTUSION OF LEFT KNEE, INITIAL ENCOUNTER: ICD-10-CM

## 2021-08-04 DIAGNOSIS — S89.91XA INJURY OF RIGHT KNEE, INITIAL ENCOUNTER: ICD-10-CM

## 2021-08-04 DIAGNOSIS — S89.92XA INJURY OF LEFT KNEE, INITIAL ENCOUNTER: ICD-10-CM

## 2021-08-04 PROCEDURE — 73564 X-RAY EXAM KNEE 4 OR MORE: CPT | Mod: TC,RT | Performed by: PHYSICIAN ASSISTANT

## 2021-08-04 PROCEDURE — 73564 X-RAY EXAM KNEE 4 OR MORE: CPT | Mod: TC,LT | Performed by: PHYSICIAN ASSISTANT

## 2021-08-04 PROCEDURE — 99214 OFFICE O/P EST MOD 30 MIN: CPT | Performed by: PHYSICIAN ASSISTANT

## 2021-08-04 RX ORDER — METHYLPREDNISOLONE 4 MG/1
TABLET ORAL
Qty: 21 TABLET | Refills: 0 | Status: SHIPPED | OUTPATIENT
Start: 2021-08-04 | End: 2021-08-04

## 2021-08-04 RX ORDER — PREDNISONE 5 MG/1
TABLET ORAL
Qty: 21 EACH | Refills: 0 | Status: SHIPPED | OUTPATIENT
Start: 2021-08-04 | End: 2021-08-26

## 2021-08-04 ASSESSMENT — ENCOUNTER SYMPTOMS
VOMITING: 0
FEVER: 0
BLURRED VISION: 0
CHILLS: 0
FALLS: 1
SORE THROAT: 0
PALPITATIONS: 0
TINGLING: 0
SENSORY CHANGE: 0
SHORTNESS OF BREATH: 0
NAUSEA: 0

## 2021-08-04 NOTE — LETTER
August 4, 2021         Patient: Alexus Saldaña   YOB: 1965   Date of Visit: 8/4/2021           To Whom it May Concern:    Alexus Saldaña was seen in my clinic on 8/4/2021 for medical reason. She may return to work on 8/8/2021.    If you have any questions or concerns, please don't hesitate to call.        Sincerely,           Melissa Luna P.A.-C.  Electronically Signed

## 2021-08-04 NOTE — PROGRESS NOTES
Subjective:      Alexus Saldaña is a 56 y.o. female who presents with Knee Injury (x 9 days on bilateral knees after a fall.  Pt. states she was stepped over a fence and foot got caught on the fence and she fell flat on bilateral knees.  She is having swelling, bruising and pain.  No prior injuries.  )    HPI:  Alexus Saldaña is a 56 y.o. female who presents today for evaluation of bilateral knee pain.  Patient ports at 9 days ago she was entering her yard through a small gate that they have to keep her dogs inside.  Instead of opening the gate she tried to step over it and her foot got caught on top of it causing her to fall forward.  She landed directly on both of her knees.  She states that she had a moderate to severe amount of pain immediately.  She attempted to go to work afterwards but was sent home.  She states that she has been taking ibuprofen and Tylenol and applying ice to the area but she has not had a chance to rest it.  She works 10-hour days and is on her feet for her whole shift.  She says that she has continued to have a moderate amount of pain in her knees bilaterally, worse on the left side.  Denies any previous injury to the knees.  No distal numbness/tingling.      Review of Systems   Constitutional: Negative for chills and fever.   HENT: Negative for sore throat.    Eyes: Negative for blurred vision.   Respiratory: Negative for shortness of breath.    Cardiovascular: Negative for chest pain and palpitations.   Gastrointestinal: Negative for nausea and vomiting.   Musculoskeletal: Positive for falls and joint pain (Knee).   Neurological: Negative for tingling and sensory change.       PMH:  has a past medical history of TIA (transient ischemic attack).  MEDS:   Current Outpatient Medications:   •  ibuprofen (IBU-200) 200 MG Tab, Take 200 mg by mouth every 6 hours as needed., Disp: , Rfl:   ALLERGIES: No Known Allergies  SURGHX:   Past Surgical History:   Procedure Laterality Date   •  "BIOPSY GENERAL      thyroid   • BREAST BIOPSY       SOCHX:  reports that she has been smoking cigarettes. She has been smoking about 0.50 packs per day. She has never used smokeless tobacco. She reports current alcohol use. She reports that she does not use drugs.  FH: Family history was reviewed, no pertinent findings to report     Objective:     /88   Pulse (!) 102   Temp 36.4 °C (97.5 °F) (Temporal)   Resp 14   Ht 1.575 m (5' 2\")   Wt 56 kg (123 lb 8 oz)   LMP 04/01/2015   SpO2 98%   BMI 22.59 kg/m²      Physical Exam  Constitutional:       Appearance: She is well-developed.   HENT:      Head: Normocephalic and atraumatic.      Right Ear: External ear normal.      Left Ear: External ear normal.   Eyes:      Conjunctiva/sclera: Conjunctivae normal.      Pupils: Pupils are equal, round, and reactive to light.   Cardiovascular:      Rate and Rhythm: Normal rate and regular rhythm.      Heart sounds: Normal heart sounds. No murmur heard.     Pulmonary:      Effort: Pulmonary effort is normal.      Breath sounds: Normal breath sounds. No wheezing.   Musculoskeletal:      Comments: Lower extremities bilaterally exhibit tenderness of the patella and some mild soft tissue swelling overlying the knees.  It is little bit worse on the left side.  There is a small abrasion noted on the right knee that is well scabbed over without signs of infection.  Mild crepitus noted.  Decreased range of motion with full flexion secondary to pain.  Mildly antalgic gait.  Distal neurovascular intact.  Cap refill less than 2 seconds.   Skin:     General: Skin is warm and dry.      Capillary Refill: Capillary refill takes less than 2 seconds.   Neurological:      Mental Status: She is alert and oriented to person, place, and time.   Psychiatric:         Behavior: Behavior normal.         Judgment: Judgment normal.         DX-KNEE COMPLETE 4+ LEFT  FINDINGS:  There is no fracture.     Alignment is normal.     No degenerative " changes are present.     IMPRESSION:  Negative left knee series    DX-KNEE COMPLETE 4+ RIGHT   FINDINGS:  Bone density is normal. There is no evidence of fracture or dislocation. There is no evidence of arthropathy. There is no joint effusion.     IMPRESSION:  No evidence of acute fracture or dislocation.    Assessment/Plan:     1. Contusion of left knee, initial encounter  - DX-KNEE COMPLETE 4+ LEFT; Future  - REFERRAL TO SPORTS MEDICINE  - predniSONE 5 MG (21) Tablet Therapy Pack; Take as directed  Dispense: 21 Each; Refill: 0    2. Contusion of right knee, initial encounter  - DX-KNEE COMPLETE 4+ RIGHT; Future  - REFERRAL TO SPORTS MEDICINE  - predniSONE 5 MG (21) Tablet Therapy Pack; Take as directed  Dispense: 21 Each; Refill: 0    3. Fall, initial encounter  - DX-KNEE COMPLETE 4+ LEFT; Future  - DX-KNEE COMPLETE 4+ RIGHT; Future  - REFERRAL TO SPORTS MEDICINE    4. Bilateral knee swelling  - REFERRAL TO SPORTS MEDICINE  - predniSONE 5 MG (21) Tablet Therapy Pack; Take as directed  Dispense: 21 Each; Refill: 0      X-rays are negative for any acute fracture or dislocation.  Patient does still have some soft tissue swelling with some mild crepitus noted and decreased range of motion.  He is exquisitely tender to palpation and we are 9 days out from the initial injury.  Patient was given some knee braces to wear and we will prescribe a course of prednisone and in place of the ibuprofen.  Patient was advised to rest, keep extremities elevated as much as possible, and apply ice multiple times per day.  She was given a note excusing her for work for the next few days.  Referral placed to sports medicine for further evaluation management of her symptoms if they persist.            Differential Diagnosis, natural history, and supportive care discussed. Return to the Urgent Care or follow up with your PCP if symptoms fail to resolve, or for any new or worsening symptoms. Emergency room precautions discussed. Patient  and/or family appears understanding of information.

## 2021-08-26 ENCOUNTER — APPOINTMENT (OUTPATIENT)
Dept: RADIOLOGY | Facility: MEDICAL CENTER | Age: 56
End: 2021-08-26
Attending: EMERGENCY MEDICINE
Payer: COMMERCIAL

## 2021-08-26 ENCOUNTER — HOSPITAL ENCOUNTER (EMERGENCY)
Facility: MEDICAL CENTER | Age: 56
End: 2021-08-26
Attending: EMERGENCY MEDICINE
Payer: COMMERCIAL

## 2021-08-26 VITALS
WEIGHT: 121.69 LBS | BODY MASS INDEX: 22.39 KG/M2 | SYSTOLIC BLOOD PRESSURE: 176 MMHG | HEIGHT: 62 IN | OXYGEN SATURATION: 99 % | RESPIRATION RATE: 14 BRPM | TEMPERATURE: 97.7 F | DIASTOLIC BLOOD PRESSURE: 96 MMHG | HEART RATE: 81 BPM

## 2021-08-26 DIAGNOSIS — R10.9 FLANK PAIN: ICD-10-CM

## 2021-08-26 LAB
ALBUMIN SERPL BCP-MCNC: 4.4 G/DL (ref 3.2–4.9)
ALBUMIN/GLOB SERPL: 1.6 G/DL
ALP SERPL-CCNC: 129 U/L (ref 30–99)
ALT SERPL-CCNC: 19 U/L (ref 2–50)
ANION GAP SERPL CALC-SCNC: 9 MMOL/L (ref 7–16)
APPEARANCE UR: CLEAR
AST SERPL-CCNC: 21 U/L (ref 12–45)
BACTERIA #/AREA URNS HPF: ABNORMAL /HPF
BASOPHILS # BLD AUTO: 0.6 % (ref 0–1.8)
BASOPHILS # BLD: 0.03 K/UL (ref 0–0.12)
BILIRUB SERPL-MCNC: 0.3 MG/DL (ref 0.1–1.5)
BILIRUB UR QL STRIP.AUTO: NEGATIVE
BUN SERPL-MCNC: 7 MG/DL (ref 8–22)
CALCIUM SERPL-MCNC: 9.1 MG/DL (ref 8.5–10.5)
CHLORIDE SERPL-SCNC: 107 MMOL/L (ref 96–112)
CO2 SERPL-SCNC: 27 MMOL/L (ref 20–33)
COLOR UR: YELLOW
CREAT SERPL-MCNC: 0.61 MG/DL (ref 0.5–1.4)
EOSINOPHIL # BLD AUTO: 0.11 K/UL (ref 0–0.51)
EOSINOPHIL NFR BLD: 2.2 % (ref 0–6.9)
EPI CELLS #/AREA URNS HPF: ABNORMAL /HPF
ERYTHROCYTE [DISTWIDTH] IN BLOOD BY AUTOMATED COUNT: 46.6 FL (ref 35.9–50)
GLOBULIN SER CALC-MCNC: 2.7 G/DL (ref 1.9–3.5)
GLUCOSE SERPL-MCNC: 95 MG/DL (ref 65–99)
GLUCOSE UR STRIP.AUTO-MCNC: NEGATIVE MG/DL
HCT VFR BLD AUTO: 45.4 % (ref 37–47)
HGB BLD-MCNC: 15.3 G/DL (ref 12–16)
HYALINE CASTS #/AREA URNS LPF: ABNORMAL /LPF
IMM GRANULOCYTES # BLD AUTO: 0.01 K/UL (ref 0–0.11)
IMM GRANULOCYTES NFR BLD AUTO: 0.2 % (ref 0–0.9)
KETONES UR STRIP.AUTO-MCNC: NEGATIVE MG/DL
LEUKOCYTE ESTERASE UR QL STRIP.AUTO: NEGATIVE
LIPASE SERPL-CCNC: 50 U/L (ref 11–82)
LYMPHOCYTES # BLD AUTO: 1.56 K/UL (ref 1–4.8)
LYMPHOCYTES NFR BLD: 31.3 % (ref 22–41)
MCH RBC QN AUTO: 32.1 PG (ref 27–33)
MCHC RBC AUTO-ENTMCNC: 33.7 G/DL (ref 33.6–35)
MCV RBC AUTO: 95.4 FL (ref 81.4–97.8)
MICRO URNS: ABNORMAL
MONOCYTES # BLD AUTO: 0.47 K/UL (ref 0–0.85)
MONOCYTES NFR BLD AUTO: 9.4 % (ref 0–13.4)
NEUTROPHILS # BLD AUTO: 2.8 K/UL (ref 2–7.15)
NEUTROPHILS NFR BLD: 56.3 % (ref 44–72)
NITRITE UR QL STRIP.AUTO: NEGATIVE
NRBC # BLD AUTO: 0 K/UL
NRBC BLD-RTO: 0 /100 WBC
PH UR STRIP.AUTO: 7 [PH] (ref 5–8)
PLATELET # BLD AUTO: 160 K/UL (ref 164–446)
PMV BLD AUTO: 10.9 FL (ref 9–12.9)
POTASSIUM SERPL-SCNC: 4 MMOL/L (ref 3.6–5.5)
PROT SERPL-MCNC: 7.1 G/DL (ref 6–8.2)
PROT UR QL STRIP: NEGATIVE MG/DL
RBC # BLD AUTO: 4.76 M/UL (ref 4.2–5.4)
RBC # URNS HPF: ABNORMAL /HPF
RBC UR QL AUTO: ABNORMAL
SODIUM SERPL-SCNC: 143 MMOL/L (ref 135–145)
SP GR UR STRIP.AUTO: 1
UROBILINOGEN UR STRIP.AUTO-MCNC: 0.2 MG/DL
WBC # BLD AUTO: 5 K/UL (ref 4.8–10.8)
WBC #/AREA URNS HPF: ABNORMAL /HPF

## 2021-08-26 PROCEDURE — 85025 COMPLETE CBC W/AUTO DIFF WBC: CPT

## 2021-08-26 PROCEDURE — 99284 EMERGENCY DEPT VISIT MOD MDM: CPT

## 2021-08-26 PROCEDURE — 74176 CT ABD & PELVIS W/O CONTRAST: CPT

## 2021-08-26 PROCEDURE — 700111 HCHG RX REV CODE 636 W/ 250 OVERRIDE (IP): Performed by: EMERGENCY MEDICINE

## 2021-08-26 PROCEDURE — 80053 COMPREHEN METABOLIC PANEL: CPT

## 2021-08-26 PROCEDURE — 81001 URINALYSIS AUTO W/SCOPE: CPT

## 2021-08-26 PROCEDURE — 96372 THER/PROPH/DIAG INJ SC/IM: CPT

## 2021-08-26 PROCEDURE — 83690 ASSAY OF LIPASE: CPT

## 2021-08-26 RX ORDER — ONDANSETRON 2 MG/ML
4 INJECTION INTRAMUSCULAR; INTRAVENOUS ONCE
Status: DISCONTINUED | OUTPATIENT
Start: 2021-08-26 | End: 2021-08-26

## 2021-08-26 RX ORDER — KETOROLAC TROMETHAMINE 30 MG/ML
30 INJECTION, SOLUTION INTRAMUSCULAR; INTRAVENOUS ONCE
Status: DISCONTINUED | OUTPATIENT
Start: 2021-08-26 | End: 2021-08-26

## 2021-08-26 RX ORDER — KETOROLAC TROMETHAMINE 30 MG/ML
30 INJECTION, SOLUTION INTRAMUSCULAR; INTRAVENOUS ONCE
Status: COMPLETED | OUTPATIENT
Start: 2021-08-26 | End: 2021-08-26

## 2021-08-26 RX ORDER — ONDANSETRON 4 MG/1
4 TABLET, ORALLY DISINTEGRATING ORAL ONCE
Status: COMPLETED | OUTPATIENT
Start: 2021-08-26 | End: 2021-08-26

## 2021-08-26 RX ADMIN — KETOROLAC TROMETHAMINE 30 MG: 30 INJECTION, SOLUTION INTRAMUSCULAR at 15:09

## 2021-08-26 RX ADMIN — ONDANSETRON 4 MG: 4 TABLET, ORALLY DISINTEGRATING ORAL at 15:09

## 2021-08-26 ASSESSMENT — ENCOUNTER SYMPTOMS
VOMITING: 0
ABDOMINAL PAIN: 0
FLANK PAIN: 1
POLYDIPSIA: 0
NAUSEA: 0
CHILLS: 0
FALLS: 0
DIAPHORESIS: 0
SHORTNESS OF BREATH: 0
FEVER: 0
COUGH: 0

## 2021-08-26 NOTE — ED PROVIDER NOTES
ED Provider Note    CHIEF COMPLAINT  Chief Complaint   Patient presents with   • Flank Pain     bilateral pain, R side kidney stone was dx 7/27 and has not passed it, has not been able to go in to urology       HPI  Alexus Saldaña is a 56 y.o. female presenting to the ED today with colicky bilateral flank pain. She saw her PCP a month ago due to R sided flank pain, and was subsequently found to have a R sided kidney stone measuring 6 mm on the right. Following the imaging, she began having L sided flank pain as well, and has not been able to get an appointment with [facility], per her PCP's referral, as they are booking out over a month. She reports that the pain is low grade, and constant in nature.  Nothing seems alleviate exacerbate her symptoms.  She has no radiation of pain to the abdomen, she has no chest pain or shortness of breath.  Ibuprofen provides some relief, but she has had to leave work early the past week due to pain.  Again symptoms been ongoing for a month, and have been steady.    ROS  Review of Systems   Constitutional: Negative for chills, diaphoresis and fever.   Respiratory: Negative for cough and shortness of breath.    Cardiovascular: Negative for chest pain.   Gastrointestinal: Negative for abdominal pain, nausea and vomiting.   Genitourinary: Positive for flank pain and hematuria.   Musculoskeletal: Negative for falls.   Endo/Heme/Allergies: Negative for polydipsia.       See HPI for further details, o/w negative.     PAST MEDICAL HISTORY   has a past medical history of TIA (transient ischemic attack).    SOCIAL HISTORY  Social History     Tobacco Use   • Smoking status: Current Every Day Smoker     Packs/day: 0.50     Types: Cigarettes   • Smokeless tobacco: Never Used   Vaping Use   • Vaping Use: Never used   Substance and Sexual Activity   • Alcohol use: Yes     Comment: occ   • Drug use: No   • Sexual activity: Not on file       Family History  No bleeding disorders    SURGICAL  HISTORY   has a past surgical history that includes biopsy general; breast biopsy; and primary c section.    CURRENT MEDICATIONS  Home Medications     Reviewed by Angélica Shaffer R.N. (Registered Nurse) on 08/26/21 at 1154  Med List Status: Complete   Medication Last Dose Status   ibuprofen (IBU-200) 200 MG Tab 8/26/2021 Active                ALLERGIES  No Known Allergies    REVIEW OF SYSTEMS  See HPI for further details. Review of systems as above, otherwise all other systems are negative.     PHYSICAL EXAM  Constitutional: Well developed, well nourished.  acute distress.  HEENT: Normocephalic, atraumatic. Posterior pharynx clear and moist.  Eyes:  EOMI. Normal sclera.  Neck: Supple, Full range of motion, nontender.  Chest/Pulmonary: clear to ausculation. Symmetrical expansion.   Cardio: Regular rate and rhythm with no murmur.   Abdomen: Soft, nontender. No peritoneal signs. No guarding. No palpable masses.  Back: No CVA tenderness, nontender midline, no step offs.  Musculoskeletal: No deformity, no edema, neurovascular intact.   Neuro: Clear speech, appropriate, cooperative, cranial nerves II-XII grossly intact.  Psych: Normal mood and affect      CT-RENAL COLIC EVALUATION(A/P W/O)   Final Result         1.  Small nonobstructing right renal stone. No significant hydronephrosis.   2.  Moderate hiatal hernia.   3.  Emphysematous changes.   4.  Atherosclerosis.                 Results for orders placed or performed during the hospital encounter of 08/26/21   CBC WITH DIFFERENTIAL   Result Value Ref Range    WBC 5.0 4.8 - 10.8 K/uL    RBC 4.76 4.20 - 5.40 M/uL    Hemoglobin 15.3 12.0 - 16.0 g/dL    Hematocrit 45.4 37.0 - 47.0 %    MCV 95.4 81.4 - 97.8 fL    MCH 32.1 27.0 - 33.0 pg    MCHC 33.7 33.6 - 35.0 g/dL    RDW 46.6 35.9 - 50.0 fL    Platelet Count 160 (L) 164 - 446 K/uL    MPV 10.9 9.0 - 12.9 fL    Neutrophils-Polys 56.30 44.00 - 72.00 %    Lymphocytes 31.30 22.00 - 41.00 %    Monocytes 9.40 0.00 - 13.40 %     Eosinophils 2.20 0.00 - 6.90 %    Basophils 0.60 0.00 - 1.80 %    Immature Granulocytes 0.20 0.00 - 0.90 %    Nucleated RBC 0.00 /100 WBC    Neutrophils (Absolute) 2.80 2.00 - 7.15 K/uL    Lymphs (Absolute) 1.56 1.00 - 4.80 K/uL    Monos (Absolute) 0.47 0.00 - 0.85 K/uL    Eos (Absolute) 0.11 0.00 - 0.51 K/uL    Baso (Absolute) 0.03 0.00 - 0.12 K/uL    Immature Granulocytes (abs) 0.01 0.00 - 0.11 K/uL    NRBC (Absolute) 0.00 K/uL   COMP METABOLIC PANEL   Result Value Ref Range    Sodium 143 135 - 145 mmol/L    Potassium 4.0 3.6 - 5.5 mmol/L    Chloride 107 96 - 112 mmol/L    Co2 27 20 - 33 mmol/L    Anion Gap 9.0 7.0 - 16.0    Glucose 95 65 - 99 mg/dL    Bun 7 (L) 8 - 22 mg/dL    Creatinine 0.61 0.50 - 1.40 mg/dL    Calcium 9.1 8.5 - 10.5 mg/dL    AST(SGOT) 21 12 - 45 U/L    ALT(SGPT) 19 2 - 50 U/L    Alkaline Phosphatase 129 (H) 30 - 99 U/L    Total Bilirubin 0.3 0.1 - 1.5 mg/dL    Albumin 4.4 3.2 - 4.9 g/dL    Total Protein 7.1 6.0 - 8.2 g/dL    Globulin 2.7 1.9 - 3.5 g/dL    A-G Ratio 1.6 g/dL   LIPASE   Result Value Ref Range    Lipase 50 11 - 82 U/L   URINALYSIS    Specimen: Blood   Result Value Ref Range    Color Yellow     Character Clear     Specific Gravity 1.002 <1.035    Ph 7.0 5.0 - 8.0    Glucose Negative Negative mg/dL    Ketones Negative Negative mg/dL    Protein Negative Negative mg/dL    Bilirubin Negative Negative    Urobilinogen, Urine 0.2 Negative    Nitrite Negative Negative    Leukocyte Esterase Negative Negative    Occult Blood Small (A) Negative    Micro Urine Req Microscopic    URINE MICROSCOPIC (W/UA)   Result Value Ref Range    WBC 0-2 /hpf    RBC 2-5 (A) /hpf    Bacteria Few (A) None /hpf    Epithelial Cells Few /hpf    Hyaline Cast 0-2 /lpf   ESTIMATED GFR   Result Value Ref Range    GFR If African American >60 >60 mL/min/1.73 m 2    GFR If Non African American >60 >60 mL/min/1.73 m 2       PROCEDURES     MEDICAL RECORD  I have reviewed patient's medical record and pertinent results  are listed.    COURSE & MEDICAL DECISION MAKING  I have reviewed any medical record information, laboratory studies and radiographic results as noted above.    4:48 PM  The pt is nontoxic appearing, comfortable, and afebrile.  She had relief of her pain from toradol.  She will follow up with  Urology, and return for any other issues or concerns.  The patient had an ultrasound done approximate 4 weeks ago showing a very large stone, I am not sure she passed this, as it is not evident on CT today.  Patient that she did have some pain, so may be ureteral spasm is more likely at this point.      If you have had any blood pressure issues while here in the emergency department, please see your doctor for a further evaluation or work up.    Differential diagnoses include but not limited to: AAA, dissection, renal stone, uti,     This patient presents with flank pain .  At this time, I have counseled the patient/family regarding their medications, pain control, and follow up.  They will continue their medications, if any, as prescribed.  They will return immediately for any worsening symptoms and/or any other medical concerns.  They will see their doctor, or contact the doctor provided, in 1-2 days for follow up.       FINAL IMPRESSION  Flank pain-chronic      Electronically signed by: Zay Fernanedz D.O., 8/26/2021 3:47 PM

## 2021-08-26 NOTE — ED TRIAGE NOTES
"Patient vital signs rechecked and documented per [facility]. Patient denies any new needs at this time.  Patient updated on wait times, thanked for patience. Pt informed to alert triage tech or triage RN with any needs and/or changes in condition; patient verbalized understanding.   Patient stated \"its a little bit worse. I think the ibuprofen is wearing off.\"   "

## 2021-08-26 NOTE — ED TRIAGE NOTES
"Ambulates to triage  Chief Complaint   Patient presents with   • Flank Pain     bilateral pain, R side kidney stone was dx 7/27 and has not passed it, has not been able to go in to urology     Pt  Has not been seen by a doctor since being dx with kidney stones. Her pain is worse, \"it feels like I'm in labor.\" given a specimen cup for urine collection.   "

## 2021-09-13 ENCOUNTER — HOSPITAL ENCOUNTER (OUTPATIENT)
Dept: RADIOLOGY | Facility: MEDICAL CENTER | Age: 56
End: 2021-09-13
Attending: PHYSICIAN ASSISTANT
Payer: COMMERCIAL

## 2021-09-13 DIAGNOSIS — R31.0 GROSS HEMATURIA: ICD-10-CM

## 2021-09-13 PROCEDURE — 74178 CT ABD&PLV WO CNTR FLWD CNTR: CPT

## 2021-09-13 PROCEDURE — 700117 HCHG RX CONTRAST REV CODE 255: Performed by: PHYSICIAN ASSISTANT

## 2021-09-13 RX ADMIN — IOHEXOL 100 ML: 350 INJECTION, SOLUTION INTRAVENOUS at 14:13

## 2021-12-15 ENCOUNTER — APPOINTMENT (OUTPATIENT)
Dept: RADIOLOGY | Facility: IMAGING CENTER | Age: 56
End: 2021-12-15
Attending: NURSE PRACTITIONER
Payer: COMMERCIAL

## 2021-12-15 ENCOUNTER — OFFICE VISIT (OUTPATIENT)
Dept: URGENT CARE | Facility: CLINIC | Age: 56
End: 2021-12-15
Payer: COMMERCIAL

## 2021-12-15 VITALS
WEIGHT: 126 LBS | DIASTOLIC BLOOD PRESSURE: 90 MMHG | BODY MASS INDEX: 23.19 KG/M2 | HEIGHT: 62 IN | HEART RATE: 98 BPM | OXYGEN SATURATION: 100 % | TEMPERATURE: 97.4 F | SYSTOLIC BLOOD PRESSURE: 130 MMHG | RESPIRATION RATE: 20 BRPM
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
## 2021-12-15 DIAGNOSIS — R07.89 STERNUM PAIN: ICD-10-CM

## 2021-12-15 PROCEDURE — 71046 X-RAY EXAM CHEST 2 VIEWS: CPT | Mod: TC | Performed by: RADIOLOGY

## 2021-12-15 PROCEDURE — 99214 OFFICE O/P EST MOD 30 MIN: CPT | Performed by: NURSE PRACTITIONER

## 2021-12-15 PROCEDURE — 71120 X-RAY EXAM BREASTBONE 2/>VWS: CPT | Mod: TC | Performed by: RADIOLOGY

## 2021-12-15 RX ORDER — IBUPROFEN 200 MG
800 TABLET ORAL ONCE
Status: COMPLETED | OUTPATIENT
Start: 2021-12-15 | End: 2021-12-15

## 2021-12-15 RX ORDER — HYDROCODONE BITARTRATE AND ACETAMINOPHEN 5; 325 MG/1; MG/1
1 TABLET ORAL EVERY 4 HOURS PRN
Qty: 18 TABLET | Refills: 0 | Status: SHIPPED | OUTPATIENT
Start: 2021-12-15 | End: 2021-12-18

## 2021-12-15 RX ADMIN — Medication 800 MG: at 20:25

## 2021-12-15 ASSESSMENT — PAIN SCALES - GENERAL: PAINLEVEL: 10=SEVERE PAIN

## 2021-12-15 ASSESSMENT — ENCOUNTER SYMPTOMS
SHORTNESS OF BREATH: 0
LOSS OF CONSCIOUSNESS: 0
FATIGUE: 0
CHILLS: 0
NAUSEA: 0
NECK PAIN: 0
SORE THROAT: 0
FEVER: 0
EYE PAIN: 0
MYALGIAS: 0
DIZZINESS: 0
VOMITING: 0
HEADACHES: 0
ABDOMINAL PAIN: 0

## 2021-12-15 ASSESSMENT — FIBROSIS 4 INDEX: FIB4 SCORE: 1.63

## 2021-12-15 NOTE — LETTER
December 15, 2021         Patient: Alexus Saldaña   YOB: 1965   Date of Visit: 12/15/2021           To Whom it May Concern:    Alexus Saldaña was seen in my clinic on 12/15/2021. She may return to work on 12/19/21.    If you have any questions or concerns, please don't hesitate to call.        Sincerely,           YANDY Thakur.  Electronically Signed

## 2021-12-16 RX ORDER — AMLODIPINE BESYLATE 5 MG/1
TABLET ORAL NIGHTLY
COMMUNITY
Start: 2021-12-02

## 2021-12-16 RX ORDER — AMLODIPINE BESYLATE 2.5 MG/1
TABLET ORAL
COMMUNITY
Start: 2021-10-27 | End: 2022-11-07

## 2021-12-16 RX ORDER — LISINOPRIL 10 MG/1
TABLET ORAL EVERY MORNING
Status: ON HOLD | COMMUNITY
Start: 2021-10-19 | End: 2022-11-16
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
## 2022-04-29 ENCOUNTER — OFFICE VISIT (OUTPATIENT)
Dept: URGENT CARE | Facility: CLINIC | Age: 57
End: 2022-04-29
Payer: COMMERCIAL

## 2022-04-29 VITALS
OXYGEN SATURATION: 96 % | SYSTOLIC BLOOD PRESSURE: 130 MMHG | HEART RATE: 99 BPM | WEIGHT: 126.8 LBS | BODY MASS INDEX: 23.34 KG/M2 | RESPIRATION RATE: 16 BRPM | DIASTOLIC BLOOD PRESSURE: 80 MMHG | HEIGHT: 62 IN | TEMPERATURE: 98.4 F

## 2022-04-29 DIAGNOSIS — S01.01XA LACERATION OF SCALP WITHOUT FOREIGN BODY, INITIAL ENCOUNTER: ICD-10-CM

## 2022-04-29 DIAGNOSIS — T14.8XXA MULTIPLE SKIN TEARS: ICD-10-CM

## 2022-04-29 PROCEDURE — 12011 RPR F/E/E/N/L/M 2.5 CM/<: CPT | Performed by: NURSE PRACTITIONER

## 2022-04-29 RX ORDER — CEPHALEXIN 500 MG/1
500 CAPSULE ORAL 3 TIMES DAILY
Qty: 15 CAPSULE | Refills: 0 | Status: SHIPPED | OUTPATIENT
Start: 2022-04-29 | End: 2022-05-04

## 2022-04-29 ASSESSMENT — FIBROSIS 4 INDEX: FIB4 SCORE: 1.66

## 2022-04-29 NOTE — LETTER
April 29, 2022         Patient: Alexus Saldaña   YOB: 1965   Date of Visit: 4/29/2022           To Whom it May Concern:    Alexus Saldaña was seen in my clinic on 4/29/2022. She may return to work on 5/1/22.    If you have any questions or concerns, please don't hesitate to call.        Sincerely,           YANDY Thakur.  Electronically Signed

## 2022-05-02 ENCOUNTER — APPOINTMENT (OUTPATIENT)
Dept: RADIOLOGY | Facility: IMAGING CENTER | Age: 57
End: 2022-05-02
Attending: NURSE PRACTITIONER
Payer: COMMERCIAL

## 2022-05-02 ENCOUNTER — OFFICE VISIT (OUTPATIENT)
Dept: URGENT CARE | Facility: CLINIC | Age: 57
End: 2022-05-02
Payer: COMMERCIAL

## 2022-05-02 VITALS
HEART RATE: 90 BPM | HEIGHT: 62 IN | DIASTOLIC BLOOD PRESSURE: 80 MMHG | WEIGHT: 126 LBS | OXYGEN SATURATION: 95 % | TEMPERATURE: 97.8 F | BODY MASS INDEX: 23.19 KG/M2 | RESPIRATION RATE: 16 BRPM | SYSTOLIC BLOOD PRESSURE: 120 MMHG

## 2022-05-02 DIAGNOSIS — M25.531 RIGHT WRIST PAIN: ICD-10-CM

## 2022-05-02 DIAGNOSIS — T14.8XXA MULTIPLE SKIN TEARS: ICD-10-CM

## 2022-05-02 DIAGNOSIS — R07.81 RIB PAIN ON RIGHT SIDE: ICD-10-CM

## 2022-05-02 PROCEDURE — 73110 X-RAY EXAM OF WRIST: CPT | Mod: TC,RT | Performed by: NURSE PRACTITIONER

## 2022-05-02 PROCEDURE — 71101 X-RAY EXAM UNILAT RIBS/CHEST: CPT | Mod: TC,RT | Performed by: NURSE PRACTITIONER

## 2022-05-02 PROCEDURE — 99214 OFFICE O/P EST MOD 30 MIN: CPT | Performed by: NURSE PRACTITIONER

## 2022-05-02 ASSESSMENT — ENCOUNTER SYMPTOMS
DIZZINESS: 0
NAUSEA: 0
SORE THROAT: 0
MYALGIAS: 0
SHORTNESS OF BREATH: 0
EYE PAIN: 0
FEVER: 0
CHILLS: 0
VOMITING: 0

## 2022-05-02 ASSESSMENT — FIBROSIS 4 INDEX: FIB4 SCORE: 1.66

## 2022-05-02 NOTE — PROGRESS NOTES
Subjective:   Alexus Saldaña is a 57 y.o. female who presents for Fall (Was seen 04/29 initially (R) wrist and (R) rib pain)      HPI  Patient is a 57-year-old female who returns to the urgent care for evaluation of right rib pain and right wrist pain after she fell 4/29.  Patient was evaluated in the urgent care on 4/29 by myself was treated for laceration of the scalp with Dermabond and Steri-Strips and started on antibiotics for multiple abrasions on her right hand.  She initially did not have any pain of the right wrist and/or ribs which she also verbalized she did not feel she needed x-rays at that time pain has worsened which prompted her follow-up today.  She denies any acute headaches at today's visit.  She has been trying Tylenol Motrin with minimal relief in symptoms.  Review of Systems   Constitutional: Negative for chills and fever.   HENT: Negative for sore throat.    Eyes: Negative for pain.   Respiratory: Negative for shortness of breath.    Cardiovascular: Negative for chest pain.   Gastrointestinal: Negative for nausea and vomiting.   Genitourinary: Negative for hematuria.   Musculoskeletal: Positive for joint pain. Negative for myalgias.   Skin: Negative for rash.   Neurological: Negative for dizziness.       Medications:    • amLODIPine Tabs  • cephALEXin Caps  • lisinopril Tabs    Allergies: Patient has no known allergies.    Problem List: Alexus Saldaña does not have a problem list on file.    Surgical History:  Past Surgical History:   Procedure Laterality Date   • BIOPSY GENERAL      thyroid   • BREAST BIOPSY     • PRIMARY C SECTION      x2       Past Social Hx: Alexus Saldaña  reports that she has been smoking cigarettes. She has been smoking about 0.50 packs per day. She has never used smokeless tobacco. She reports current alcohol use. She reports that she does not use drugs.     Past Family Hx:  Alexus Saldaña family history is not on file.     Problem list, medications,  "and allergies reviewed by myself today in Epic.     Objective:     /80   Pulse 90   Temp 36.6 °C (97.8 °F) (Temporal)   Resp 16   Ht 1.575 m (5' 2\")   Wt 57.2 kg (126 lb)   LMP 04/01/2015   SpO2 95%   BMI 23.05 kg/m²     Physical Exam  Vitals and nursing note reviewed.   Constitutional:       General: She is not in acute distress.     Appearance: She is well-developed.   HENT:      Head: Normocephalic and atraumatic.      Right Ear: External ear normal.      Left Ear: External ear normal.      Nose: Nose normal.      Mouth/Throat:      Mouth: Mucous membranes are moist.   Eyes:      Conjunctiva/sclera: Conjunctivae normal.   Cardiovascular:      Rate and Rhythm: Normal rate.   Pulmonary:      Effort: Pulmonary effort is normal. No respiratory distress.      Breath sounds: Normal breath sounds. Wheezes: .gba.   Chest:      Chest wall: Tenderness present.       Abdominal:      General: There is no distension.   Musculoskeletal:      Right wrist: Swelling, tenderness and bony tenderness present. No crepitus. Normal range of motion. Normal pulse.      Right hand: Tenderness present.      Cervical back: Normal.      Thoracic back: Normal.      Lumbar back: Normal.   Skin:     General: Skin is warm and dry.      Findings: Abrasion and laceration present.          Neurological:      General: No focal deficit present.      Mental Status: She is alert and oriented to person, place, and time. Mental status is at baseline.      Gait: Gait (gait at baseline) normal.   Psychiatric:         Judgment: Judgment normal.         Assessment/Plan:     Diagnosis and associated orders:     1. Right wrist pain  DX-WRIST-COMPLETE 3+ RIGHT   2. Rib pain on right side  [id number] (WITH 1-VIEW CXR) RIGHT   3. Multiple skin tears          Comments/MDM:     I independently reviewed the patient's imaging and agree with the interpretation of the radiologist.    Normal rib series.  No radiographic evidence of acute traumatic " injury    I personally reviewed prior external notes and prior test results pertinent to today's visit.   X-rays negative for fracture dislocation, she will continue taking prescribed antibiotics as directed, continue with wound care.  Did provide a right wrist brace for support.  Recommend to continue with Tylenol Motrin as needed for pain.  Discussed management options, risks and benefits, and alternatives to treatment plan agreed upon.   Red flags discussed and indications to immediately call 911 or present to the Emergency Department.   Supportive care, differential diagnoses, and indications for immediate follow-up discussed with patient.    Patient expresses understanding and agrees to plan. Patient denies any other questions or concerns.            My total time spent caring for the patient on the day of the encounter was 30 minutes.   This does not include time spent on separately billable procedures/tests.      Please note that this dictation was created using voice recognition software. I have made a reasonable attempt to correct obvious errors, but I expect that there are errors of grammar and possibly content that I did not discover before finalizing the note.    This note was electronically signed by Matt SAMUEL.

## 2022-05-03 ASSESSMENT — ENCOUNTER SYMPTOMS
MYALGIAS: 0
HEADACHES: 1
DIZZINESS: 0
EYE PAIN: 0
LOSS OF CONSCIOUSNESS: 0
SORE THROAT: 0
SHORTNESS OF BREATH: 0
CHILLS: 0
NAUSEA: 0
VOMITING: 0
FEVER: 0

## 2022-05-03 NOTE — PROCEDURES
Laceration Repair    Date/Time: 5/3/2022 10:54 AM  Performed by: KARINE Thakur  Authorized by: KARINE Thakur   Body area: head/neck  Location details: forehead  Laceration length: 2 cm  Contamination: The wound is contaminated.  Foreign bodies: unknown  Tendon involvement: none  Nerve involvement: none  Vascular damage: no    Sedation:  Patient sedated: no    Preparation: Patient was prepped and draped in the usual sterile fashion.  Irrigation solution: tap water  Irrigation method: syringe  Amount of cleaning: extensive  Debridement: none  Degree of undermining: none  Skin closure: glue and Steri-Strips  Approximation: close  Approximation difficulty: simple  Dressing: antibiotic ointment  Patient tolerance: patient tolerated the procedure well with no immediate complications

## 2022-05-03 NOTE — PROGRESS NOTES
Subjective:   Alexus Saldaña is a 57 y.o. female who presents for Head Laceration      HPI  Patient is a 57-year-old female presents the urgent care for evaluation of a laceration of her head and multiple skin tears that occurred approximately 1 hour prior to arrival.  Patient states that she was walking her dog when she fell landing on her right side on asphalt.  She denies loss of consciousness.  She did hit her head and has a mild headache.  She denies any blurred vision.  Patient does not take any blood thinners.  Patient having no bony tenderness, is able to move all of her extremities.  She denies any numbness or tingling in the right upper extremity.  She is not take any pain medication for the symptoms.  Review of Systems   Constitutional: Negative for chills and fever.   HENT: Negative for sore throat.    Eyes: Negative for pain.   Respiratory: Negative for shortness of breath.    Cardiovascular: Negative for chest pain.   Gastrointestinal: Negative for nausea and vomiting.   Genitourinary: Negative for hematuria.   Musculoskeletal: Negative for myalgias.   Skin: Negative for rash.        Multiple skin tears right hand, laceration right side of head   Neurological: Positive for headaches. Negative for dizziness and loss of consciousness.       Medications:    • amLODIPine Tabs  • cephALEXin Caps  • lisinopril Tabs    Allergies: Patient has no known allergies.    Problem List: Alexus Saldaña does not have a problem list on file.    Surgical History:  Past Surgical History:   Procedure Laterality Date   • BIOPSY GENERAL      thyroid   • BREAST BIOPSY     • PRIMARY C SECTION      x2       Past Social Hx: Alexus Saldaña  reports that she has been smoking cigarettes. She has been smoking about 0.50 packs per day. She has never used smokeless tobacco. She reports current alcohol use. She reports that she does not use drugs.     Past Family Hx:  Alexus Saldaña family history is not on file.  "    Problem list, medications, and allergies reviewed by myself today in Epic.     Objective:     /80 (BP Location: Left arm, Patient Position: Sitting, BP Cuff Size: Adult long)   Pulse 99   Temp 36.9 °C (98.4 °F) (Temporal)   Resp 16   Ht 1.575 m (5' 2\")   Wt 57.5 kg (126 lb 12.8 oz)   LMP 04/01/2015   SpO2 96%   BMI 23.19 kg/m²     Physical Exam  Vitals and nursing note reviewed.   Constitutional:       General: She is not in acute distress.     Appearance: She is well-developed.   HENT:      Head: Normocephalic and atraumatic.        Right Ear: External ear normal.      Left Ear: External ear normal.      Nose: Nose normal.      Mouth/Throat:      Mouth: Mucous membranes are moist.   Eyes:      Conjunctiva/sclera: Conjunctivae normal.   Neck:      Meningeal: Brudzinski's sign and Kernig's sign absent.   Cardiovascular:      Rate and Rhythm: Normal rate.   Pulmonary:      Effort: Pulmonary effort is normal. No respiratory distress.      Breath sounds: Normal breath sounds.   Abdominal:      General: There is no distension.   Musculoskeletal:         General: Normal range of motion.      Right shoulder: Normal.      Left shoulder: Normal.      Right elbow: Normal.      Left elbow: Normal.      Right hand: Laceration and tenderness present. No swelling or bony tenderness. Normal strength. Normal sensation. There is no disruption of two-point discrimination. Normal capillary refill. Normal pulse.      Left hand: No lacerations or tenderness.      Cervical back: Normal and full passive range of motion without pain.      Thoracic back: Normal.      Lumbar back: Normal.   Skin:     General: Skin is warm and dry.      Findings: Abrasion present. No rash.             Comments: Patient with multiple skin tears of the dorsal aspect of right hand, skin ecchymotic, bleeding.  Skin tear 1cm on the right index finger partially gaping across the PIP on the dorsal aspect.   Neurological:      General: No focal " deficit present.      Mental Status: She is alert and oriented to person, place, and time. Mental status is at baseline. She is not disoriented.      GCS: GCS eye subscore is 4. GCS verbal subscore is 5. GCS motor subscore is 6.      Cranial Nerves: No cranial nerve deficit.      Sensory: No sensory deficit.      Gait: Gait (gait at baseline) normal.      Deep Tendon Reflexes: Reflexes are normal and symmetric.   Psychiatric:         Judgment: Judgment normal.         Assessment/Plan:     Diagnosis and associated orders:     1. Multiple skin tears  cephALEXin (KEFLEX) 500 MG Cap   2. Laceration of scalp without foreign body, initial encounter  cephALEXin (KEFLEX) 500 MG Cap      Comments/MDM:     • Patient is a 57-year-old female present with the stated above, wounds cleansed copiously with NS and chlorhexidine, repaired laceration of the scalp with Dermabond and Steri-Strips.  Hand bandage with Polysporin, nonadherent gauze and Coban.  Open laceration on the right index finger closed with Dermabond with good approximation.  Patient verbalizing numerous times that she does not have any acute bony tenderness, does not find any x-ray imaging necessary at this time.  Patient did not have loss of consciousness, is neurologically intact at today's exam.  Discussed CT imaging not indicated today.  • Use over-the-counter pain reliever, such as acetaminophen (Tylenol), ibuprofen (Advil, Motrin) or naproxen (Aleve) as needed; follow package directions for dosing.  I personally reviewed prior external notes and prior test results pertinent to today's visit.   Discussed management options, risks and benefits, and alternatives to treatment plan agreed upon.   Red flags discussed and indications to immediately call 911 or present to the Emergency Department.   Supportive care, differential diagnoses, and indications for immediate follow-up discussed with patient.    • Patient expresses understanding and agrees to plan. Patient  denies any other questions or concerns.             Please note that this dictation was created using voice recognition software. I have made a reasonable attempt to correct obvious errors, but I expect that there are errors of grammar and possibly content that I did not discover before finalizing the note.    This note was electronically signed by Matt SAMUEL.

## 2022-08-15 ENCOUNTER — HOSPITAL ENCOUNTER (OUTPATIENT)
Dept: RADIOLOGY | Facility: MEDICAL CENTER | Age: 57
End: 2022-08-15
Attending: PHYSICIAN ASSISTANT
Payer: COMMERCIAL

## 2022-08-15 DIAGNOSIS — M94.0 CHONDROCOSTAL JUNCTION SYNDROME: ICD-10-CM

## 2022-08-15 PROCEDURE — 77080 DXA BONE DENSITY AXIAL: CPT

## 2022-08-31 ENCOUNTER — HOSPITAL ENCOUNTER (OUTPATIENT)
Dept: RADIOLOGY | Facility: MEDICAL CENTER | Age: 57
End: 2022-08-31
Attending: PHYSICIAN ASSISTANT
Payer: COMMERCIAL

## 2022-08-31 DIAGNOSIS — M81.8 OTHER OSTEOPOROSIS WITHOUT CURRENT PATHOLOGICAL FRACTURE: ICD-10-CM

## 2022-08-31 PROCEDURE — 700117 HCHG RX CONTRAST REV CODE 255

## 2022-08-31 PROCEDURE — 71260 CT THORAX DX C+: CPT

## 2022-08-31 RX ADMIN — IOHEXOL 75 ML: 350 INJECTION, SOLUTION INTRAVENOUS at 11:31

## 2022-10-07 ENCOUNTER — HOSPITAL ENCOUNTER (OUTPATIENT)
Dept: RADIOLOGY | Facility: MEDICAL CENTER | Age: 57
End: 2022-10-07
Attending: FAMILY MEDICINE
Payer: COMMERCIAL

## 2022-10-07 DIAGNOSIS — N63.20 MASS OF LEFT BREAST, UNSPECIFIED QUADRANT: ICD-10-CM

## 2022-10-12 ENCOUNTER — HOSPITAL ENCOUNTER (OUTPATIENT)
Dept: RADIOLOGY | Facility: MEDICAL CENTER | Age: 57
End: 2022-10-12
Payer: COMMERCIAL

## 2022-10-19 ENCOUNTER — HOSPITAL ENCOUNTER (OUTPATIENT)
Dept: RADIOLOGY | Facility: MEDICAL CENTER | Age: 57
End: 2022-10-19
Attending: FAMILY MEDICINE
Payer: COMMERCIAL

## 2022-10-19 DIAGNOSIS — N63.20 MASS OF LEFT BREAST, UNSPECIFIED QUADRANT: ICD-10-CM

## 2022-10-19 DIAGNOSIS — R92.8 ABNORMAL FINDING ON BREAST IMAGING: ICD-10-CM

## 2022-10-19 PROCEDURE — 88342 IMHCHEM/IMCYTCHM 1ST ANTB: CPT

## 2022-10-19 PROCEDURE — 76642 ULTRASOUND BREAST LIMITED: CPT | Mod: RT

## 2022-10-19 PROCEDURE — 19083 BX BREAST 1ST LESION US IMAG: CPT | Mod: LT

## 2022-10-19 PROCEDURE — 77065 DX MAMMO INCL CAD UNI: CPT | Mod: LT

## 2022-10-19 PROCEDURE — 88305 TISSUE EXAM BY PATHOLOGIST: CPT

## 2022-10-19 PROCEDURE — G0279 TOMOSYNTHESIS, MAMMO: HCPCS

## 2022-10-19 PROCEDURE — 88341 IMHCHEM/IMCYTCHM EA ADD ANTB: CPT

## 2022-10-19 PROCEDURE — 88360 TUMOR IMMUNOHISTOCHEM/MANUAL: CPT

## 2022-10-25 LAB — PATHOLOGY CONSULT NOTE: NORMAL

## 2022-10-31 ENCOUNTER — PATIENT OUTREACH (OUTPATIENT)
Dept: ONCOLOGY | Facility: MEDICAL CENTER | Age: 57
End: 2022-10-31
Payer: COMMERCIAL

## 2022-10-31 DIAGNOSIS — Z65.8 PSYCHOSOCIAL DISTRESS: ICD-10-CM

## 2022-10-31 NOTE — PROGRESS NOTES
"Oncology Nurse Navigation Assessment  Phoned pt for follow up.  Pt states she saw Dr. Galaviz and is scheduled for a partial mastectomy and sentinel lymph node biopsy on 11/16/22.  She states she was recently diagnosed with osteoporosis after she fractured her rib and sternum lifting something at work.  She was out of work for twelve weeks and does not have PTO.  She states she is unsure if short term disability is available through her employer and agrees to contact her Human Resource Department to inquire.    She states she recently received a clearance to return to work from her PCP Dr. [place] with a 10 lb lifting restriction.  Pt states she fears even this is too much as she injured herself lifting something lighter than that.    Pt states she is being followed by Lashawn Holland for her osteoporosis.  She states she was told she is unable to begin taking medication for her osteoporosis as she has a hernia that may need surgical intervention.  She states all treatment of her other conditions have been put on hold until she is done with her cancer treatment. She has a tele health follow up visit with Dr. Holland on 11/26/22.    DX: DCIS - pathology report states \"even if the microinvasion was not identified that   since this was extensive DCIS forma a mass like lesion to emphasize   that on excision the possibility of finding invasion carcinoma is high\"    POC: partial mastectomy / sentinel lymph node biopsy          BARRIERS ASSESSMENT:    TRANSPORTATION:   EMPLOYMENT:  Works at [employer] in receiving 32 hours a week.  She states she is going back to work after being out of work for 12 wks.  Her last day worked was 7/29/22. She has FMLA in place for a spur in her spine.  She will also be out of work post op 11/16-11/26.     FINANCIAL: does not have paid time off   INSURANCE:  [employer].  She states she is afraid she will lose her insurance due to her time off.  SUPPORT SYSTEM:   Shaheed is " "her primary support.  Pt gives staff permission to talk to him. Pt's sister Penny Chauhan lives in [place] may be able to travel to be with pt post op.   PSYCHOLOGICAL: DST: 8/10 \"not knowing what the results will be.\" \"going back to work\"    COMMUNICATION: no barrier noted    FAMILY CARE: denies barriers  SELF CARE: denies barriers         INTERVENTION:   Oncology social work referral for assessment / community resources  [facility]  Deepa Wray  "

## 2022-11-07 ENCOUNTER — PRE-ADMISSION TESTING (OUTPATIENT)
Dept: ADMISSIONS | Facility: MEDICAL CENTER | Age: 57
End: 2022-11-07
Attending: SURGERY
Payer: COMMERCIAL

## 2022-11-07 DIAGNOSIS — Z01.810 PRE-OPERATIVE CARDIOVASCULAR EXAMINATION: ICD-10-CM

## 2022-11-07 DIAGNOSIS — Z01.812 PRE-OPERATIVE LABORATORY EXAMINATION: ICD-10-CM

## 2022-11-07 LAB
ANION GAP SERPL CALC-SCNC: 12 MMOL/L (ref 7–16)
BUN SERPL-MCNC: 8 MG/DL (ref 8–22)
CALCIUM SERPL-MCNC: 9.5 MG/DL (ref 8.5–10.5)
CHLORIDE SERPL-SCNC: 103 MMOL/L (ref 96–112)
CO2 SERPL-SCNC: 26 MMOL/L (ref 20–33)
CREAT SERPL-MCNC: 0.66 MG/DL (ref 0.5–1.4)
EKG IMPRESSION: NORMAL
ERYTHROCYTE [DISTWIDTH] IN BLOOD BY AUTOMATED COUNT: 50.7 FL (ref 35.9–50)
EST. AVERAGE GLUCOSE BLD GHB EST-MCNC: 111 MG/DL
GFR SERPLBLD CREATININE-BSD FMLA CKD-EPI: 103 ML/MIN/1.73 M 2
GLUCOSE SERPL-MCNC: 96 MG/DL (ref 65–99)
HBA1C MFR BLD: 5.5 % (ref 4–5.6)
HCT VFR BLD AUTO: 47.1 % (ref 37–47)
HGB BLD-MCNC: 15.7 G/DL (ref 12–16)
MCH RBC QN AUTO: 31.4 PG (ref 27–33)
MCHC RBC AUTO-ENTMCNC: 33.3 G/DL (ref 33.6–35)
MCV RBC AUTO: 94.2 FL (ref 81.4–97.8)
PLATELET # BLD AUTO: 201 K/UL (ref 164–446)
PMV BLD AUTO: 11.4 FL (ref 9–12.9)
POTASSIUM SERPL-SCNC: 3.6 MMOL/L (ref 3.6–5.5)
RBC # BLD AUTO: 5 M/UL (ref 4.2–5.4)
SODIUM SERPL-SCNC: 141 MMOL/L (ref 135–145)
WBC # BLD AUTO: 6.7 K/UL (ref 4.8–10.8)

## 2022-11-07 PROCEDURE — 93010 ELECTROCARDIOGRAM REPORT: CPT | Performed by: INTERNAL MEDICINE

## 2022-11-07 PROCEDURE — 85027 COMPLETE CBC AUTOMATED: CPT

## 2022-11-07 PROCEDURE — 83036 HEMOGLOBIN GLYCOSYLATED A1C: CPT

## 2022-11-07 PROCEDURE — 80048 BASIC METABOLIC PNL TOTAL CA: CPT

## 2022-11-07 PROCEDURE — 93005 ELECTROCARDIOGRAM TRACING: CPT

## 2022-11-07 PROCEDURE — 36415 COLL VENOUS BLD VENIPUNCTURE: CPT

## 2022-11-07 RX ORDER — FAMOTIDINE 20 MG/1
20 TABLET, FILM COATED ORAL EVERY EVENING
COMMUNITY
Start: 2022-09-27

## 2022-11-07 RX ORDER — LEVOCETIRIZINE DIHYDROCHLORIDE 5 MG/1
TABLET, FILM COATED ORAL DAILY
COMMUNITY

## 2022-11-07 RX ORDER — LEVOTHYROXINE SODIUM 0.03 MG/1
TABLET ORAL EVERY EVENING
COMMUNITY
Start: 2022-09-26

## 2022-11-07 ASSESSMENT — FIBROSIS 4 INDEX: FIB4 SCORE: 1.66

## 2022-11-16 ENCOUNTER — APPOINTMENT (OUTPATIENT)
Dept: RADIOLOGY | Facility: MEDICAL CENTER | Age: 57
End: 2022-11-16
Attending: SURGERY
Payer: COMMERCIAL

## 2022-11-16 ENCOUNTER — ANESTHESIA (OUTPATIENT)
Dept: SURGERY | Facility: MEDICAL CENTER | Age: 57
End: 2022-11-16
Payer: COMMERCIAL

## 2022-11-16 ENCOUNTER — ANESTHESIA EVENT (OUTPATIENT)
Dept: SURGERY | Facility: MEDICAL CENTER | Age: 57
End: 2022-11-16
Payer: COMMERCIAL

## 2022-11-16 ENCOUNTER — HOSPITAL ENCOUNTER (OUTPATIENT)
Facility: MEDICAL CENTER | Age: 57
End: 2022-11-16
Attending: SURGERY | Admitting: SURGERY
Payer: COMMERCIAL

## 2022-11-16 VITALS
HEART RATE: 78 BPM | RESPIRATION RATE: 20 BRPM | SYSTOLIC BLOOD PRESSURE: 124 MMHG | HEIGHT: 62 IN | DIASTOLIC BLOOD PRESSURE: 75 MMHG | WEIGHT: 123.02 LBS | BODY MASS INDEX: 22.64 KG/M2 | OXYGEN SATURATION: 91 % | TEMPERATURE: 97.2 F

## 2022-11-16 DIAGNOSIS — G89.18 POSTOPERATIVE PAIN: ICD-10-CM

## 2022-11-16 DIAGNOSIS — D05.12 INTRADUCTAL CARCINOMA IN SITU OF LEFT BREAST: ICD-10-CM

## 2022-11-16 LAB — PATHOLOGY CONSULT NOTE: NORMAL

## 2022-11-16 PROCEDURE — 88341 IMHCHEM/IMCYTCHM EA ADD ANTB: CPT | Mod: 91

## 2022-11-16 PROCEDURE — 160009 HCHG ANES TIME/MIN: Performed by: SURGERY

## 2022-11-16 PROCEDURE — 88307 TISSUE EXAM BY PATHOLOGIST: CPT | Mod: 59

## 2022-11-16 PROCEDURE — 88360 TUMOR IMMUNOHISTOCHEM/MANUAL: CPT | Mod: 91

## 2022-11-16 PROCEDURE — 700101 HCHG RX REV CODE 250: Performed by: SURGERY

## 2022-11-16 PROCEDURE — 76098 X-RAY EXAM SURGICAL SPECIMEN: CPT | Mod: LT

## 2022-11-16 PROCEDURE — 88342 IMHCHEM/IMCYTCHM 1ST ANTB: CPT

## 2022-11-16 PROCEDURE — A9270 NON-COVERED ITEM OR SERVICE: HCPCS | Performed by: ANESTHESIOLOGY

## 2022-11-16 PROCEDURE — 700111 HCHG RX REV CODE 636 W/ 250 OVERRIDE (IP): Performed by: SURGERY

## 2022-11-16 PROCEDURE — 700102 HCHG RX REV CODE 250 W/ 637 OVERRIDE(OP): Performed by: ANESTHESIOLOGY

## 2022-11-16 PROCEDURE — 160028 HCHG SURGERY MINUTES - 1ST 30 MINS LEVEL 3: Performed by: SURGERY

## 2022-11-16 PROCEDURE — 700105 HCHG RX REV CODE 258: Performed by: SURGERY

## 2022-11-16 PROCEDURE — 160002 HCHG RECOVERY MINUTES (STAT): Performed by: SURGERY

## 2022-11-16 PROCEDURE — 700111 HCHG RX REV CODE 636 W/ 250 OVERRIDE (IP): Performed by: ANESTHESIOLOGY

## 2022-11-16 PROCEDURE — 38792 RA TRACER ID OF SENTINL NODE: CPT | Mod: LT

## 2022-11-16 PROCEDURE — 00400 ANES INTEGUMENTARY SYS NOS: CPT | Performed by: ANESTHESIOLOGY

## 2022-11-16 PROCEDURE — 160035 HCHG PACU - 1ST 60 MINS PHASE I: Performed by: SURGERY

## 2022-11-16 PROCEDURE — 160039 HCHG SURGERY MINUTES - EA ADDL 1 MIN LEVEL 3: Performed by: SURGERY

## 2022-11-16 PROCEDURE — 160048 HCHG OR STATISTICAL LEVEL 1-5: Performed by: SURGERY

## 2022-11-16 PROCEDURE — 160025 RECOVERY II MINUTES (STATS): Performed by: SURGERY

## 2022-11-16 PROCEDURE — 160046 HCHG PACU - 1ST 60 MINS PHASE II: Performed by: SURGERY

## 2022-11-16 PROCEDURE — 700101 HCHG RX REV CODE 250: Performed by: ANESTHESIOLOGY

## 2022-11-16 RX ORDER — HYDROMORPHONE HYDROCHLORIDE 1 MG/ML
0.4 INJECTION, SOLUTION INTRAMUSCULAR; INTRAVENOUS; SUBCUTANEOUS
Status: DISCONTINUED | OUTPATIENT
Start: 2022-11-16 | End: 2022-11-16 | Stop reason: HOSPADM

## 2022-11-16 RX ORDER — MIDAZOLAM HYDROCHLORIDE 1 MG/ML
INJECTION INTRAMUSCULAR; INTRAVENOUS PRN
Status: DISCONTINUED | OUTPATIENT
Start: 2022-11-16 | End: 2022-11-16 | Stop reason: SURG

## 2022-11-16 RX ORDER — HYDROMORPHONE HYDROCHLORIDE 1 MG/ML
0.2 INJECTION, SOLUTION INTRAMUSCULAR; INTRAVENOUS; SUBCUTANEOUS
Status: DISCONTINUED | OUTPATIENT
Start: 2022-11-16 | End: 2022-11-16 | Stop reason: HOSPADM

## 2022-11-16 RX ORDER — SODIUM CHLORIDE, SODIUM LACTATE, POTASSIUM CHLORIDE, CALCIUM CHLORIDE 600; 310; 30; 20 MG/100ML; MG/100ML; MG/100ML; MG/100ML
INJECTION, SOLUTION INTRAVENOUS CONTINUOUS
Status: DISCONTINUED | OUTPATIENT
Start: 2022-11-16 | End: 2022-11-16 | Stop reason: HOSPADM

## 2022-11-16 RX ORDER — OXYCODONE HCL 5 MG/5 ML
10 SOLUTION, ORAL ORAL
Status: COMPLETED | OUTPATIENT
Start: 2022-11-16 | End: 2022-11-16

## 2022-11-16 RX ORDER — ONDANSETRON 2 MG/ML
4 INJECTION INTRAMUSCULAR; INTRAVENOUS
Status: DISCONTINUED | OUTPATIENT
Start: 2022-11-16 | End: 2022-11-16 | Stop reason: HOSPADM

## 2022-11-16 RX ORDER — HYDROMORPHONE HYDROCHLORIDE 1 MG/ML
0.1 INJECTION, SOLUTION INTRAMUSCULAR; INTRAVENOUS; SUBCUTANEOUS
Status: DISCONTINUED | OUTPATIENT
Start: 2022-11-16 | End: 2022-11-16 | Stop reason: HOSPADM

## 2022-11-16 RX ORDER — HALOPERIDOL 5 MG/ML
1 INJECTION INTRAMUSCULAR
Status: DISCONTINUED | OUTPATIENT
Start: 2022-11-16 | End: 2022-11-16 | Stop reason: HOSPADM

## 2022-11-16 RX ORDER — ACETAMINOPHEN 500 MG
1000 TABLET ORAL ONCE
Status: DISCONTINUED | OUTPATIENT
Start: 2022-11-16 | End: 2022-11-16 | Stop reason: HOSPADM

## 2022-11-16 RX ORDER — ISOSULFAN BLUE 50 MG/5ML
INJECTION, SOLUTION SUBCUTANEOUS
Status: DISCONTINUED | OUTPATIENT
Start: 2022-11-16 | End: 2022-11-16 | Stop reason: HOSPADM

## 2022-11-16 RX ORDER — MEPERIDINE HYDROCHLORIDE 25 MG/ML
12.5 INJECTION INTRAMUSCULAR; INTRAVENOUS; SUBCUTANEOUS ONCE
Status: COMPLETED | OUTPATIENT
Start: 2022-11-16 | End: 2022-11-16

## 2022-11-16 RX ORDER — CEFAZOLIN SODIUM 1 G/3ML
INJECTION, POWDER, FOR SOLUTION INTRAMUSCULAR; INTRAVENOUS PRN
Status: DISCONTINUED | OUTPATIENT
Start: 2022-11-16 | End: 2022-11-16 | Stop reason: SURG

## 2022-11-16 RX ORDER — DEXAMETHASONE SODIUM PHOSPHATE 4 MG/ML
INJECTION, SOLUTION INTRA-ARTICULAR; INTRALESIONAL; INTRAMUSCULAR; INTRAVENOUS; SOFT TISSUE PRN
Status: DISCONTINUED | OUTPATIENT
Start: 2022-11-16 | End: 2022-11-16 | Stop reason: SURG

## 2022-11-16 RX ORDER — LABETALOL HYDROCHLORIDE 5 MG/ML
5 INJECTION, SOLUTION INTRAVENOUS
Status: DISCONTINUED | OUTPATIENT
Start: 2022-11-16 | End: 2022-11-16 | Stop reason: HOSPADM

## 2022-11-16 RX ORDER — OXYCODONE HCL 5 MG/5 ML
5 SOLUTION, ORAL ORAL
Status: COMPLETED | OUTPATIENT
Start: 2022-11-16 | End: 2022-11-16

## 2022-11-16 RX ORDER — ACETAMINOPHEN AND CODEINE PHOSPHATE 300; 30 MG/1; MG/1
1 TABLET ORAL EVERY 4 HOURS PRN
Qty: 28 TABLET | Refills: 0 | Status: SHIPPED | OUTPATIENT
Start: 2022-11-16 | End: 2022-11-23

## 2022-11-16 RX ORDER — LIDOCAINE HYDROCHLORIDE 20 MG/ML
INJECTION, SOLUTION EPIDURAL; INFILTRATION; INTRACAUDAL; PERINEURAL PRN
Status: DISCONTINUED | OUTPATIENT
Start: 2022-11-16 | End: 2022-11-16 | Stop reason: SURG

## 2022-11-16 RX ORDER — BUPIVACAINE HYDROCHLORIDE AND EPINEPHRINE 5; 5 MG/ML; UG/ML
INJECTION, SOLUTION PERINEURAL
Status: DISCONTINUED | OUTPATIENT
Start: 2022-11-16 | End: 2022-11-16 | Stop reason: HOSPADM

## 2022-11-16 RX ORDER — BUPIVACAINE HYDROCHLORIDE AND EPINEPHRINE 5; 5 MG/ML; UG/ML
INJECTION, SOLUTION EPIDURAL; INTRACAUDAL; PERINEURAL
Status: DISCONTINUED
Start: 2022-11-16 | End: 2022-11-16 | Stop reason: HOSPADM

## 2022-11-16 RX ORDER — ISOSULFAN BLUE 50 MG/5ML
INJECTION, SOLUTION SUBCUTANEOUS
Status: DISCONTINUED
Start: 2022-11-16 | End: 2022-11-16 | Stop reason: HOSPADM

## 2022-11-16 RX ORDER — DIPHENHYDRAMINE HYDROCHLORIDE 50 MG/ML
12.5 INJECTION INTRAMUSCULAR; INTRAVENOUS
Status: DISCONTINUED | OUTPATIENT
Start: 2022-11-16 | End: 2022-11-16 | Stop reason: HOSPADM

## 2022-11-16 RX ORDER — ONDANSETRON 2 MG/ML
INJECTION INTRAMUSCULAR; INTRAVENOUS PRN
Status: DISCONTINUED | OUTPATIENT
Start: 2022-11-16 | End: 2022-11-16 | Stop reason: SURG

## 2022-11-16 RX ORDER — HYDRALAZINE HYDROCHLORIDE 20 MG/ML
5 INJECTION INTRAMUSCULAR; INTRAVENOUS
Status: DISCONTINUED | OUTPATIENT
Start: 2022-11-16 | End: 2022-11-16 | Stop reason: HOSPADM

## 2022-11-16 RX ADMIN — DEXAMETHASONE SODIUM PHOSPHATE 8 MG: 4 INJECTION, SOLUTION INTRA-ARTICULAR; INTRALESIONAL; INTRAMUSCULAR; INTRAVENOUS; SOFT TISSUE at 09:55

## 2022-11-16 RX ADMIN — LIDOCAINE HYDROCHLORIDE 100 MG: 20 INJECTION, SOLUTION EPIDURAL; INFILTRATION; INTRACAUDAL at 09:50

## 2022-11-16 RX ADMIN — OXYCODONE HYDROCHLORIDE 10 MG: 5 SOLUTION ORAL at 11:12

## 2022-11-16 RX ADMIN — CEFAZOLIN 2 G: 330 INJECTION, POWDER, FOR SOLUTION INTRAMUSCULAR; INTRAVENOUS at 09:49

## 2022-11-16 RX ADMIN — FENTANYL CITRATE 50 MCG: 50 INJECTION, SOLUTION INTRAMUSCULAR; INTRAVENOUS at 09:55

## 2022-11-16 RX ADMIN — FENTANYL CITRATE 50 MCG: 50 INJECTION, SOLUTION INTRAMUSCULAR; INTRAVENOUS at 09:49

## 2022-11-16 RX ADMIN — SODIUM CHLORIDE, POTASSIUM CHLORIDE, SODIUM LACTATE AND CALCIUM CHLORIDE 1000 ML: 600; 310; 30; 20 INJECTION, SOLUTION INTRAVENOUS at 09:38

## 2022-11-16 RX ADMIN — ONDANSETRON 4 MG: 2 INJECTION INTRAMUSCULAR; INTRAVENOUS at 09:55

## 2022-11-16 RX ADMIN — MIDAZOLAM HYDROCHLORIDE 2 MG: 1 INJECTION, SOLUTION INTRAMUSCULAR; INTRAVENOUS at 09:49

## 2022-11-16 RX ADMIN — PROPOFOL 150 MG: 10 INJECTION, EMULSION INTRAVENOUS at 09:52

## 2022-11-16 RX ADMIN — MEPERIDINE HYDROCHLORIDE 12.5 MG: 25 INJECTION INTRAMUSCULAR; INTRAVENOUS; SUBCUTANEOUS at 10:57

## 2022-11-16 RX ADMIN — FENTANYL CITRATE 25 MCG: 50 INJECTION INTRAMUSCULAR; INTRAVENOUS at 10:59

## 2022-11-16 ASSESSMENT — FIBROSIS 4 INDEX: FIB4 SCORE: 1.32

## 2022-11-16 ASSESSMENT — PAIN DESCRIPTION - PAIN TYPE
TYPE: SURGICAL PAIN

## 2022-11-16 NOTE — DISCHARGE INSTRUCTIONS
What to Expect Post Anesthesia    Rest and take it easy for the first 24 hours.  A responsible adult is recommended to remain with you during that time.  It is normal to feel sleepy.  We encourage you to not do anything that requires balance, judgment or coordination.    FOR 24 HOURS DO NOT:  Drive, operate machinery or run household appliances.  Drink beer or alcoholic beverages.  Make important decisions or sign legal documents.    To avoid nausea, slowly advance diet as tolerated, avoiding spicy or greasy foods for the first day.  Add more substantial food to your diet according to your provider's instructions.  INCREASE FLUIDS AND FIBER TO AVOID CONSTIPATION.    MILD FLU-LIKE SYMPTOMS ARE NORMAL.  YOU MAY EXPERIENCE GENERALIZED MUSCLE ACHES, THROAT IRRITATION, HEADACHE AND/OR SOME NAUSEA.    If any questions arise, call your provider.  Dr. Galaviz's telephone #: [phone]  If your provider is not available, please feel free to call the Surgical Center at [phone].    MEDICATIONS: Resume taking daily medication.  Take prescribed pain medication with food.  If no medication is prescribed, you may take non-aspirin pain medication if needed.  PAIN MEDICATION CAN BE VERY CONSTIPATING.  Take a stool softener or laxative such as senokot, pericolace, or milk of magnesia if needed.    Last pain medication given at 11:15am, you may have next dose of pain medication at 3:15pm.    Discharge Instructions for Lumpectomy and [place] Lymph Node Biospy:     On the day of surgery we will be removing the lump of your breast cancer (lumpectomy) and through a separate incision under your arm we will be taking out the 2-3 lymph nodes that drain your breast (sentinel lymph node dissection).     Wound Care  You will have 2 incisions: 1) on your breast (lumpectomy) and 2) under your arm (sentinel lymph node biopsy).  All of your stitches are buried under the skin and dissolveable. There are no sutures to remove.   You can  shower the day after your surgery and get your wound wet (it will be sealed by the waterproof dressings)  You may have some bruising after surgery. This is normal.  There may be a small amount of drainage from your wounds, this is usually normal and nothing to worry about. Place a dry gauze or bandage (available at any drug store) on the wound to absorb any drainage.  You can remove the dressing 2 days after surgery.      For Pain  Wear your bra as much as possible including to bed. The less your breast moves the less it will hurt.  You may take Tylenol or Advil every 4-6 hours as directed on the bottle.  You will be given a prescription for more severe pain. You can use it as needed.     Work  If you would like, you may return to work the day after your biopsy.  If you need a work excuse for the day of surgery or for any days thereafter, please let us know.     When to call the doctor  If you have severe, uncontrolled pain at the biopsy site.  If you run at fever of 100.5?F or higher within a few days of the biopsy.  If you have a large amount of drainage that is soaking the bandage more than once a day.  If the area around your wound becomes red/inflamed.  Make sure you schedule and attend all follow-up visits with your doctor. You should have a follow up appointment in about a week after surgery.     If you have any additional questions, please do not hesitate to call the office and speak to either myself or the physician on call.     Office address:  [address] [place] [address] [id number]  [place] [address]           Mikayla Galaviz MD  [facility]  [phone]                 Routing History

## 2022-11-16 NOTE — ANESTHESIA POSTPROCEDURE EVALUATION
Patient: Alexus Saldaña    Procedure Summary     Date: 11/16/22 Room / Location: [facility] ROOM 23 / SURGERY SAME DAY [facility]    Anesthesia Start: 0944 Anesthesia Stop: 1031    Procedure: LEFT PARTIAL MASTECTOMY WITH LEFT AXILLARY SENTINEL LYMPH NODE BIOPSY (Left: Breast) Diagnosis: (INTRADUCTAL CARCINOMA IN SITU OF BREAST - LEFT)    Surgeons: Mikayla Galaviz M.D. Responsible Provider: Fidencio Magdaleno M.D.    Anesthesia Type: general ASA Status: 3          Final Anesthesia Type: general  Last vitals  BP   Blood Pressure: 124/75    Temp   36.2 °C (97.2 °F)    Pulse   78   Resp   20    SpO2   91 %      Anesthesia Post Evaluation    Patient location during evaluation: PACU  Patient participation: complete - patient participated  Level of consciousness: awake and alert    Airway patency: patent  Anesthetic complications: no  Cardiovascular status: hemodynamically stable  Respiratory status: acceptable  Hydration status: euvolemic    PONV: none          No notable events documented.     Nurse Pain Score: 2 (NPRS)

## 2022-11-16 NOTE — PROGRESS NOTES
Discharge Instructions for Lumpectomy and [place] Lymph Node Biospy:    On the day of surgery we will be removing the lump of your breast cancer (lumpectomy) and through a separate incision under your arm we will be taking out the 2-3 lymph nodes that drain your breast (sentinel lymph node dissection).    Wound Care  You will have 2 incisions: 1) on your breast (lumpectomy) and 2) under your arm (sentinel lymph node biopsy).  All of your stitches are buried under the skin and dissolveable. There are no sutures to remove.   You can shower the day after your surgery and get your wound wet (it will be sealed by the waterproof dressings)  You may have some bruising after surgery. This is normal.  There may be a small amount of drainage from your wounds, this is usually normal and nothing to worry about. Place a dry gauze or bandage (available at any drug store) on the wound to absorb any drainage.  You can remove the dressing 2 days after surgery.     For Pain  Wear your bra as much as possible including to bed. The less your breast moves the less it will hurt.  You may take Tylenol or Advil every 4-6 hours as directed on the bottle.  You will be given a prescription for more severe pain. You can use it as needed.    Work  If you would like, you may return to work the day after your biopsy.  If you need a work excuse for the day of surgery or for any days thereafter, please let us know.    When to call the doctor  If you have severe, uncontrolled pain at the biopsy site.  If you run at fever of 100.5?F or higher within a few days of the biopsy.  If you have a large amount of drainage that is soaking the bandage more than once a day.  If the area around your wound becomes red/inflamed.  Make sure you schedule and attend all follow-up visits with your doctor. You should have a follow up appointment in about a week after surgery.    If you have any additional questions, please do not hesitate to call the office and speak  to either myself or the physician on call.    Office address:  [address] [place] [address] [address]  [place] [address]        Mikayla Galaviz MD  [facility]  [phone]

## 2022-11-16 NOTE — ANESTHESIA TIME REPORT
Anesthesia Start and Stop Event Times     Date Time Event    11/16/2022 0925 Ready for Procedure     0944 Anesthesia Start     1031 Anesthesia Stop        Responsible Staff  11/16/22    Name Role Begin End    Fidencio Magdaleno M.D. Anesth 0944 1031        Overtime Reason:  no overtime (within assigned shift)    Comments:

## 2022-11-16 NOTE — OP REPORT
Operative Report    Date: 11/16/2022    Surgeon: Mikayla Galaviz M.D.    Assistant: Otto LOFTON    My assistant, LIZZIE Hernandez, was medically necessary for this procedure. He injected the patient with 5 cc of methylene blue in order to facilitate identification of the sentinel lymph nodes, retracted the tissues necessary so I could perform the sentinel lymph node biopsy, assisted me in achieving hemostasis, and assisted in incisional closure.     Anesthesiologist: Rasta MONTES     Pre-operative Diagnosis:  D05.12 Intraductal carcinoma in situ of left breast (high grade, concern for invasive ductal carcinoma)    Post-operative Diagnosis: same     Procedure: left breast LANDRY localized partial mastectomy, left axillary sentinel lymph node biopsy, injection for identification of sentinel lymph node    80488 Mastectomy, partial   39777 Biopsy or excision of lymph node(s); open, deep axillary node(s)  11689 Intraoperative identification (eg, mapping) of sentinel lymph node(s) includes injection of non-radioactive dye    Findings:  LANDRY within excised specimen.    Procedure in detail: The patient was identified in the pre-operative holding area and brought to the operating room. Prior to the procedure, she underwent LANDRY  localization with radiology due to the non-palpable nature of the lesion. As well, she underwent radionucleotide injection by nuclear medicine.    Correct side and site were identified. Pre-operative antibiotics of ancef were administered prior to the procedure. Anesthesia was smoothly induced.    I injected 5 cc of methylene blue under the areola of the left breast, and the breast was then massaged briefly. The patient was then prepped and draped in the usual sterile fashion.    I then turned my attention to the partial mastectomy. The skin was infiltrated with local anesthetic and a curvilinear incision was made in the upper outer quadrant. The breast tissue was grasped with Allis clamps and  a core of tissue was removed around the AMBER. The specimen was then completely removed, marked with suture for orientation, and was sent for specimen radiography. I confirmed the amber to be within the excised specimen.    Additional tissue from the deep, medial and lateral margins were excised, marked for new margin, and sent for permanent pathology. eticulous hemostasis was achieved with electrocautery. The area was irrigated and suctioned. The skin was closed in two layers with vicryl and monocryl.     I proceeded with the sentinel lymph node biopsy portion of the procedure. A transverse incision was made in the lower end of the axilla after anesthetizing the skin. The incision was carried deeper into the axillary tissue and a blue-stained lymphatic was identified and traced distally to a  lymph node, which was partially blue stained and highly radioactive. This was dissected free from its surrounding tissue.     There was a second blue-stained node found deep to that, which also was radioactive. These were sent to pathology for permanent pathologic exam. The residual radioactivity in the surgical bed was <10% of the initial count.     After ensuring hemostasis, and irrigating the wound, I then closed the wound in two layers with vicryl and monocryl.     Sterile dressings were applied.     The patient was awakened from anesthetic, and was taken to the recovery room in stable condition.    Sponge and needle counts were correct at the end of the case.     Specimen:   1. left axillary sentinel lymph node tissue  2. left breast partial mastecomy  3. Additional tissue, deep margin, suture marks new margin  4. Additional tissue, medial margin, suture marks new margin  5. Additional tissue, lateral margin, suture marks new margin    EBL: minimal    Dispo: stable, extubated, to PACU    Mikayla Galaviz M.D.  [facility]  [phone]

## 2022-11-16 NOTE — ANESTHESIA PREPROCEDURE EVALUATION
Case: [id number] Date/Time: 11/16/22 1145    Procedures:       LEFT PARTIAL MASTECTOMY WITH LEFT AXILLARY SENTINEL LYMPH NODE BIOPSY (Left: Breast)      LYMPHADENECTOMY, AXILLARY    Anesthesia type: General    Pre-op diagnosis: INTRADUCTAL CARCINOMA IN SITU OF BREAST - LEFT    Location: [facility] ROOM 23 / SURGERY SAME DAY [facility]    Surgeons: Mikayla Galaviz M.D.          Relevant Problems   No relevant active problems   GERD  Hypothyroid  TIA  +tob 1/2 ppd    Physical Exam    Airway   Mallampati: II  TM distance: >3 FB  Neck ROM: full       Cardiovascular - normal exam  Rhythm: regular  Rate: normal  (-) murmur     Dental - normal exam  (+) upper dentures           Pulmonary - normal exam  Breath sounds clear to auscultation     Abdominal    Neurological - normal exam               Anesthesia Plan    ASA 3   ASA physical status 3 criteria: CVA or TIA - history (> 3 months)    Plan - general       Airway plan will be LMA          Induction: intravenous    Postoperative Plan: Postoperative administration of opioids is intended.    Pertinent diagnostic labs and testing reviewed    Informed Consent:    Anesthetic plan and risks discussed with patient.    Use of blood products discussed with: patient whom consented to blood products.

## 2022-11-16 NOTE — ANESTHESIA PROCEDURE NOTES
Airway    Date/Time: 11/16/2022 9:52 AM  Performed by: Fidencio Magdaleno M.D.  Authorized by: Fidencio Magdaleno M.D.     Location:  OR  Urgency:  Elective  Indications for Airway Management:  Anesthesia      Spontaneous Ventilation: absent    Sedation Level:  Deep  Preoxygenated: Yes    Mask Difficulty Assessment:  0 - not attempted  Final Airway Type:  Supraglottic airway  Final Supraglottic Airway:  Standard LMA    SGA Size:  3  Number of Attempts at Approach:  1

## 2022-11-16 NOTE — OR NURSING
1028- Pt to PACU from OR. Report from anesthesia and OR RN. On 6L O2 via mask. Respirations even and unlabored. VSS.      1055 Patient tolerating oral fluid intake.    1116 Patient meets phase 2 requirements.    1130 Discharge instructions discussed with patient and their , Bala. All questions answered. Verbalized understanding.    1135 Patient up to restroom to void and get dressed.    1144 PIV removed with tip intact.    1146 Pt escorted out of department in wheelchair with all belongings. Discharged home to responsible adult.

## 2022-11-22 ENCOUNTER — PATIENT OUTREACH (OUTPATIENT)
Dept: ONCOLOGY | Facility: MEDICAL CENTER | Age: 57
End: 2022-11-22
Payer: COMMERCIAL

## 2022-11-22 NOTE — PROGRESS NOTES
"On November 22, 2022, Oncology Social Worker Susan Crandall contacted pt. via telephone.  ROBERT Crandall explained her role to pt.  Pt. shared she's \"okay just worried about my incision site.  It looks pearl red.\"  ROBERT Crandall instructed pt. to call her surgeon to report this once they end their call.  Pt. agreed to do so.      Pt. shared she has a good support system.      Pt. shared she has applied for short term disability and has decided not to apply for moms on the run.  Pt. shared her  makes good money and she will receive short term disability.  Pt. shared she would prefer for someone else who needs it to get the assistance.      Pt. denies any barriers to care at this time.    "

## 2022-12-07 ENCOUNTER — PRE-ADMISSION TESTING (OUTPATIENT)
Dept: ADMISSIONS | Facility: MEDICAL CENTER | Age: 57
End: 2022-12-07
Attending: SURGERY
Payer: COMMERCIAL

## 2022-12-07 RX ORDER — PANTOPRAZOLE SODIUM 40 MG/1
40 TABLET, DELAYED RELEASE ORAL DAILY
COMMUNITY

## 2022-12-07 RX ORDER — ACETAMINOPHEN AND CODEINE PHOSPHATE 300; 30 MG/1; MG/1
TABLET ORAL PRN
COMMUNITY

## 2022-12-09 ENCOUNTER — ANESTHESIA EVENT (OUTPATIENT)
Dept: SURGERY | Facility: MEDICAL CENTER | Age: 57
End: 2022-12-09
Payer: COMMERCIAL

## 2022-12-09 ENCOUNTER — HOSPITAL ENCOUNTER (OUTPATIENT)
Facility: MEDICAL CENTER | Age: 57
End: 2022-12-09
Attending: SURGERY | Admitting: SURGERY
Payer: COMMERCIAL

## 2022-12-09 ENCOUNTER — ANESTHESIA (OUTPATIENT)
Dept: SURGERY | Facility: MEDICAL CENTER | Age: 57
End: 2022-12-09
Payer: COMMERCIAL

## 2022-12-09 VITALS
SYSTOLIC BLOOD PRESSURE: 126 MMHG | RESPIRATION RATE: 20 BRPM | TEMPERATURE: 97.2 F | WEIGHT: 121.25 LBS | HEIGHT: 62 IN | BODY MASS INDEX: 22.31 KG/M2 | OXYGEN SATURATION: 98 % | DIASTOLIC BLOOD PRESSURE: 85 MMHG | HEART RATE: 87 BPM

## 2022-12-09 DIAGNOSIS — G89.18 POSTOPERATIVE PAIN: ICD-10-CM

## 2022-12-09 LAB — PATHOLOGY CONSULT NOTE: NORMAL

## 2022-12-09 PROCEDURE — 00400 ANES INTEGUMENTARY SYS NOS: CPT | Performed by: ANESTHESIOLOGY

## 2022-12-09 PROCEDURE — 88341 IMHCHEM/IMCYTCHM EA ADD ANTB: CPT

## 2022-12-09 PROCEDURE — 700101 HCHG RX REV CODE 250: Performed by: SURGERY

## 2022-12-09 PROCEDURE — 700105 HCHG RX REV CODE 258: Performed by: ANESTHESIOLOGY

## 2022-12-09 PROCEDURE — 700101 HCHG RX REV CODE 250: Performed by: ANESTHESIOLOGY

## 2022-12-09 PROCEDURE — A9270 NON-COVERED ITEM OR SERVICE: HCPCS | Performed by: ANESTHESIOLOGY

## 2022-12-09 PROCEDURE — 160028 HCHG SURGERY MINUTES - 1ST 30 MINS LEVEL 3: Performed by: SURGERY

## 2022-12-09 PROCEDURE — 700102 HCHG RX REV CODE 250 W/ 637 OVERRIDE(OP): Performed by: ANESTHESIOLOGY

## 2022-12-09 PROCEDURE — 160048 HCHG OR STATISTICAL LEVEL 1-5: Performed by: SURGERY

## 2022-12-09 PROCEDURE — 88342 IMHCHEM/IMCYTCHM 1ST ANTB: CPT

## 2022-12-09 PROCEDURE — 700105 HCHG RX REV CODE 258: Performed by: SURGERY

## 2022-12-09 PROCEDURE — 700111 HCHG RX REV CODE 636 W/ 250 OVERRIDE (IP): Performed by: ANESTHESIOLOGY

## 2022-12-09 PROCEDURE — 160009 HCHG ANES TIME/MIN: Performed by: SURGERY

## 2022-12-09 PROCEDURE — 160025 RECOVERY II MINUTES (STATS): Performed by: SURGERY

## 2022-12-09 PROCEDURE — 160035 HCHG PACU - 1ST 60 MINS PHASE I: Performed by: SURGERY

## 2022-12-09 PROCEDURE — 160002 HCHG RECOVERY MINUTES (STAT): Performed by: SURGERY

## 2022-12-09 PROCEDURE — 160046 HCHG PACU - 1ST 60 MINS PHASE II: Performed by: SURGERY

## 2022-12-09 PROCEDURE — 88307 TISSUE EXAM BY PATHOLOGIST: CPT

## 2022-12-09 RX ORDER — OXYCODONE HCL 5 MG/5 ML
5 SOLUTION, ORAL ORAL
Status: COMPLETED | OUTPATIENT
Start: 2022-12-09 | End: 2022-12-09

## 2022-12-09 RX ORDER — SODIUM CHLORIDE, SODIUM LACTATE, POTASSIUM CHLORIDE, CALCIUM CHLORIDE 600; 310; 30; 20 MG/100ML; MG/100ML; MG/100ML; MG/100ML
INJECTION, SOLUTION INTRAVENOUS CONTINUOUS
Status: DISCONTINUED | OUTPATIENT
Start: 2022-12-09 | End: 2022-12-09 | Stop reason: HOSPADM

## 2022-12-09 RX ORDER — ONDANSETRON 2 MG/ML
4 INJECTION INTRAMUSCULAR; INTRAVENOUS
Status: DISCONTINUED | OUTPATIENT
Start: 2022-12-09 | End: 2022-12-09 | Stop reason: HOSPADM

## 2022-12-09 RX ORDER — HYDRALAZINE HYDROCHLORIDE 20 MG/ML
5 INJECTION INTRAMUSCULAR; INTRAVENOUS
Status: DISCONTINUED | OUTPATIENT
Start: 2022-12-09 | End: 2022-12-09 | Stop reason: HOSPADM

## 2022-12-09 RX ORDER — HALOPERIDOL 5 MG/ML
1 INJECTION INTRAMUSCULAR
Status: DISCONTINUED | OUTPATIENT
Start: 2022-12-09 | End: 2022-12-09 | Stop reason: HOSPADM

## 2022-12-09 RX ORDER — LIDOCAINE HYDROCHLORIDE 20 MG/ML
INJECTION, SOLUTION EPIDURAL; INFILTRATION; INTRACAUDAL; PERINEURAL PRN
Status: DISCONTINUED | OUTPATIENT
Start: 2022-12-09 | End: 2022-12-09 | Stop reason: SURG

## 2022-12-09 RX ORDER — MIDAZOLAM HYDROCHLORIDE 1 MG/ML
1 INJECTION INTRAMUSCULAR; INTRAVENOUS
Status: DISCONTINUED | OUTPATIENT
Start: 2022-12-09 | End: 2022-12-09 | Stop reason: HOSPADM

## 2022-12-09 RX ORDER — HYDROMORPHONE HYDROCHLORIDE 1 MG/ML
0.2 INJECTION, SOLUTION INTRAMUSCULAR; INTRAVENOUS; SUBCUTANEOUS
Status: DISCONTINUED | OUTPATIENT
Start: 2022-12-09 | End: 2022-12-09 | Stop reason: HOSPADM

## 2022-12-09 RX ORDER — OXYCODONE HCL 5 MG/5 ML
10 SOLUTION, ORAL ORAL
Status: COMPLETED | OUTPATIENT
Start: 2022-12-09 | End: 2022-12-09

## 2022-12-09 RX ORDER — HYDROCODONE BITARTRATE AND ACETAMINOPHEN 5; 325 MG/1; MG/1
1-2 TABLET ORAL EVERY 6 HOURS PRN
Qty: 30 TABLET | Refills: 0 | Status: SHIPPED | OUTPATIENT
Start: 2022-12-09 | End: 2022-12-16

## 2022-12-09 RX ORDER — HYDROMORPHONE HYDROCHLORIDE 1 MG/ML
0.1 INJECTION, SOLUTION INTRAMUSCULAR; INTRAVENOUS; SUBCUTANEOUS
Status: DISCONTINUED | OUTPATIENT
Start: 2022-12-09 | End: 2022-12-09 | Stop reason: HOSPADM

## 2022-12-09 RX ORDER — PHENYLEPHRINE HCL IN 0.9% NACL 0.5 MG/5ML
SYRINGE (ML) INTRAVENOUS PRN
Status: DISCONTINUED | OUTPATIENT
Start: 2022-12-09 | End: 2022-12-09 | Stop reason: SURG

## 2022-12-09 RX ORDER — SODIUM CHLORIDE, SODIUM LACTATE, POTASSIUM CHLORIDE, CALCIUM CHLORIDE 600; 310; 30; 20 MG/100ML; MG/100ML; MG/100ML; MG/100ML
INJECTION, SOLUTION INTRAVENOUS
Status: DISCONTINUED | OUTPATIENT
Start: 2022-12-09 | End: 2022-12-09 | Stop reason: SURG

## 2022-12-09 RX ORDER — DEXAMETHASONE SODIUM PHOSPHATE 4 MG/ML
INJECTION, SOLUTION INTRA-ARTICULAR; INTRALESIONAL; INTRAMUSCULAR; INTRAVENOUS; SOFT TISSUE PRN
Status: DISCONTINUED | OUTPATIENT
Start: 2022-12-09 | End: 2022-12-09 | Stop reason: SURG

## 2022-12-09 RX ORDER — BUPIVACAINE HYDROCHLORIDE AND EPINEPHRINE 5; 5 MG/ML; UG/ML
INJECTION, SOLUTION PERINEURAL
Status: DISCONTINUED | OUTPATIENT
Start: 2022-12-09 | End: 2022-12-09 | Stop reason: HOSPADM

## 2022-12-09 RX ORDER — METOPROLOL TARTRATE 1 MG/ML
1 INJECTION, SOLUTION INTRAVENOUS
Status: DISCONTINUED | OUTPATIENT
Start: 2022-12-09 | End: 2022-12-09 | Stop reason: HOSPADM

## 2022-12-09 RX ORDER — MEPERIDINE HYDROCHLORIDE 25 MG/ML
12.5 INJECTION INTRAMUSCULAR; INTRAVENOUS; SUBCUTANEOUS
Status: DISCONTINUED | OUTPATIENT
Start: 2022-12-09 | End: 2022-12-09 | Stop reason: HOSPADM

## 2022-12-09 RX ORDER — BUPIVACAINE HYDROCHLORIDE AND EPINEPHRINE 5; 5 MG/ML; UG/ML
INJECTION, SOLUTION EPIDURAL; INTRACAUDAL; PERINEURAL
Status: DISCONTINUED
Start: 2022-12-09 | End: 2022-12-09 | Stop reason: HOSPADM

## 2022-12-09 RX ORDER — DIPHENHYDRAMINE HYDROCHLORIDE 50 MG/ML
12.5 INJECTION INTRAMUSCULAR; INTRAVENOUS
Status: DISCONTINUED | OUTPATIENT
Start: 2022-12-09 | End: 2022-12-09 | Stop reason: HOSPADM

## 2022-12-09 RX ORDER — KETOROLAC TROMETHAMINE 30 MG/ML
INJECTION, SOLUTION INTRAMUSCULAR; INTRAVENOUS PRN
Status: DISCONTINUED | OUTPATIENT
Start: 2022-12-09 | End: 2022-12-09 | Stop reason: SURG

## 2022-12-09 RX ORDER — SCOLOPAMINE TRANSDERMAL SYSTEM 1 MG/1
1 PATCH, EXTENDED RELEASE TRANSDERMAL
Status: DISCONTINUED | OUTPATIENT
Start: 2022-12-09 | End: 2022-12-09

## 2022-12-09 RX ORDER — CEFAZOLIN SODIUM 1 G/3ML
INJECTION, POWDER, FOR SOLUTION INTRAMUSCULAR; INTRAVENOUS PRN
Status: DISCONTINUED | OUTPATIENT
Start: 2022-12-09 | End: 2022-12-09 | Stop reason: SURG

## 2022-12-09 RX ORDER — ONDANSETRON 2 MG/ML
INJECTION INTRAMUSCULAR; INTRAVENOUS PRN
Status: DISCONTINUED | OUTPATIENT
Start: 2022-12-09 | End: 2022-12-09 | Stop reason: SURG

## 2022-12-09 RX ORDER — HYDROMORPHONE HYDROCHLORIDE 1 MG/ML
0.4 INJECTION, SOLUTION INTRAMUSCULAR; INTRAVENOUS; SUBCUTANEOUS
Status: DISCONTINUED | OUTPATIENT
Start: 2022-12-09 | End: 2022-12-09 | Stop reason: HOSPADM

## 2022-12-09 RX ADMIN — OXYCODONE HYDROCHLORIDE 5 MG: 5 SOLUTION ORAL at 11:55

## 2022-12-09 RX ADMIN — ONDANSETRON 4 MG: 2 INJECTION INTRAMUSCULAR; INTRAVENOUS at 11:28

## 2022-12-09 RX ADMIN — SODIUM CHLORIDE, POTASSIUM CHLORIDE, SODIUM LACTATE AND CALCIUM CHLORIDE: 600; 310; 30; 20 INJECTION, SOLUTION INTRAVENOUS at 11:04

## 2022-12-09 RX ADMIN — KETOROLAC TROMETHAMINE 30 MG: 30 INJECTION, SOLUTION INTRAMUSCULAR at 11:28

## 2022-12-09 RX ADMIN — PROPOFOL 160 MG: 10 INJECTION, EMULSION INTRAVENOUS at 11:11

## 2022-12-09 RX ADMIN — Medication 100 MCG: at 11:26

## 2022-12-09 RX ADMIN — SODIUM CHLORIDE, POTASSIUM CHLORIDE, SODIUM LACTATE AND CALCIUM CHLORIDE: 600; 310; 30; 20 INJECTION, SOLUTION INTRAVENOUS at 09:06

## 2022-12-09 RX ADMIN — SCOPALAMINE 1 PATCH: 1 PATCH, EXTENDED RELEASE TRANSDERMAL at 10:05

## 2022-12-09 RX ADMIN — FENTANYL CITRATE 100 MCG: 50 INJECTION, SOLUTION INTRAMUSCULAR; INTRAVENOUS at 11:11

## 2022-12-09 RX ADMIN — DEXAMETHASONE SODIUM PHOSPHATE 8 MG: 4 INJECTION, SOLUTION INTRA-ARTICULAR; INTRALESIONAL; INTRAMUSCULAR; INTRAVENOUS; SOFT TISSUE at 11:11

## 2022-12-09 RX ADMIN — Medication 100 MCG: at 11:14

## 2022-12-09 RX ADMIN — CEFAZOLIN 2 G: 330 INJECTION, POWDER, FOR SOLUTION INTRAMUSCULAR; INTRAVENOUS at 11:11

## 2022-12-09 RX ADMIN — Medication 100 MCG: at 11:16

## 2022-12-09 RX ADMIN — LIDOCAINE HYDROCHLORIDE 55 MG: 20 INJECTION, SOLUTION EPIDURAL; INFILTRATION; INTRACAUDAL at 11:11

## 2022-12-09 ASSESSMENT — PAIN DESCRIPTION - PAIN TYPE
TYPE: SURGICAL PAIN
TYPE: SURGICAL PAIN;ACUTE PAIN
TYPE: SURGICAL PAIN;ACUTE PAIN
TYPE: SURGICAL PAIN

## 2022-12-09 ASSESSMENT — PAIN SCALES - GENERAL: PAIN_LEVEL: 4

## 2022-12-09 ASSESSMENT — FIBROSIS 4 INDEX: FIB4 SCORE: 1.32

## 2022-12-09 NOTE — PROGRESS NOTES
Discharge Instructions, Lumpectomy:    Care of Your Incisions:   You can shower with the dressing on as it is waterproof.  Avoid getting lotions, powders or deodorant on the incision while it is healing.   Don’t worry if the area under either incision feels firm or hard. This is normal and usually softens within a few months.    How to Manage Discomfort After Surgery:   It is normal to have tenderness, discomfort or mild swelling at the site of the incisions.     You may also feel:  - Numbness at the incisions.  - Occasional shooting pains in the breast as you heal.     You may be given a prescription for pain medication prior to being discharged from the hospital. If you are having pain, take the medication as directed by your physician and by the label directions. You should be comfortable and moving around. Most women use some prescription pain medication, though some need only over the counter pain medication, such as ibuprofen (Motrin) or acetaminophen (Tylenol). Some women have little pain and take nothing at all. Whatever amount of pain you have, it is important to listen to your body and use the medication if needed during your recovery.     Prescription pain medication may cause constipation. If you are having problems, use what you normally would or call your nurse for suggestions. It also helps to stay regular by including fiber in your diet (for example: bran or fruits and vegetables) and drink plenty of liquids (water, juice, etc.).    Activity:   You may resume your normal routine, but avoid heavy lifting (over 10 pounds), pushing or pulling until your first post-operative visit, unless otherwise specified by your surgeon.   Do not drive for at least 24-48 hours after surgery (unless otherwise directed by your surgeon), or while you are taking prescription pain medicine. Prescription pain medicine causes drowsiness (makes you sleepy) so you should avoid doing any tasks where you should be awake and  alert (driving, operating machinery, etc).    When to Call Your Doctor/Nurse:   If you have a fever greater than 100.5, increased pain, redness or warmth at the area around the incision, drainage from the incision or around the drain, or swelling of the arm. Call Dr. Galaviz's office at [phone] with any other questions or concerns.    You should have an appointment to see Dr. Galaviz about a week after surgery, call [phone] if you do not already have an appointment.    Office address:  [address] [place] [address] [address]  [place] [address]      Mikayla Galaviz M.D.  [facility]  [phone]

## 2022-12-09 NOTE — ANESTHESIA PROCEDURE NOTES
Airway    Date/Time: 12/9/2022 11:12 AM  Performed by: Bruce Harringotn D.O.  Authorized by: Bruce Harrington D.O.     Location:  OR  Urgency:  Elective  Indications for Airway Management:  Anesthesia      Spontaneous Ventilation: absent    Sedation Level:  Deep  Preoxygenated: Yes    Mask Difficulty Assessment:  0 - not attempted  Final Airway Type:  Supraglottic airway  Final Supraglottic Airway:  Standard LMA    SGA Size:  3  Number of Attempts at Approach:  1

## 2022-12-09 NOTE — ANESTHESIA POSTPROCEDURE EVALUATION
Patient: Alexus Saldaña    Procedure Summary     Date: 12/09/22 Room / Location: [facility] ROOM 23 / SURGERY SAME DAY [facility]    Anesthesia Start: 1104 Anesthesia Stop: 1138    Procedure: LEFT RE-EXICISION PARTIAL MASTECTOMY (Left: Breast) Diagnosis: (PERSONAL HISTORY OF PRIMARY MALIGNANT NEOPLASM OF BREAST)    Surgeons: Mikayla Galaviz M.D. Responsible Provider: Bruce Harrington D.O.    Anesthesia Type: general ASA Status: 3          Final Anesthesia Type: general  Last vitals  BP   Blood Pressure: (!) 92/53    Temp   36.4 °C (97.5 °F)    Pulse   86   Resp   18    SpO2   93 %      Anesthesia Post Evaluation    Patient location during evaluation: PACU  Patient participation: complete - patient participated  Level of consciousness: awake and alert  Pain score: 4    Airway patency: patent  Anesthetic complications: no  Cardiovascular status: hemodynamically stable  Respiratory status: acceptable  Hydration status: euvolemic    PONV: none          No notable events documented.     Nurse Pain Score: 4 (NPRS)

## 2022-12-09 NOTE — OP REPORT
Operative Report    Date: 12/9/2022    Surgeon: Mikayla Galaviz M.D.    Assistant: LIZZIE Hernandez    Anesthesiologist: Len MONTES    Pre-operative Diagnosis:  left DCIS, positive margins    Post-operative Diagnosis: same     Procedure: re-excision left partial mastectomy (82557 Mastectomy, partial)    Procedure in detail: The patient was identified in the pre-operative holding area and brought to the operating room. Correct side and site were identified. Pre-operative antibiotics of ancef were administered prior to the procedure. Anesthesia was smoothly induced.The patient was then prepped and draped in the usual sterile fashion.    The skin was infiltrated with local anesthetic and the prior incision was reopened and the partial mastectomy cavity evacuated of seroma. Additional tissue was taken from the anterior and lateral margins, marked for orientation, and was sent for permanent pathology. Meticulous hemostasis was achieved with electrocautery. The area was irrigated and suctioned. The skin was closed in two layers with vicryl and monocryl. Sterile dressings were applied.     The patient was awakened from anesthetic, and was taken to the recovery room in stable condition.    Sponge and needle counts were correct at the end of the case.     Specimen:   1. Additional tissue, anterior margin, skin marks new margin  2. Additional tissue, lateral margin, suture marks new margin    EBL: minimal    Dispo: stable, extubated, to PACU    Mikayla Galaviz M.D.  [facility]  [phone]        C50.0 Malignant neoplasm of nipple and areola  C50.01 Malignant neoplasm of nipple and areola, female  C50.011 Malignant neoplasm of nipple and areola, right female breast  C50.012 Malignant neoplasm of nipple and areola, left female breast  C50.019 Malignant neoplasm of nipple and areola, unspecified female breast  C50.02 Malignant neoplasm of nipple and areola, male  C50.021 Malignant neoplasm of nipple and areola,  right male breast  C50.022 Malignant neoplasm of nipple and areola, left male breast  C50.029 Malignant neoplasm of nipple and areola, unspecified male breast    C50.1 Malignant neoplasm of central portion of breast  C50.11 Malignant neoplasm of central portion of breast, female  C50.111 Malignant neoplasm of central portion of right female breast  C50.112 Malignant neoplasm of central portion of left female breast  C50.119 Malignant neoplasm of central portion of unspecified female breast  C50.12 Malignant neoplasm of central portion of breast, male  C50.121 Malignant neoplasm of central portion of right male breast  C50.122 Malignant neoplasm of central portion of left male breast  C50.129 Malignant neoplasm of central portion of unspecified male breast    C50.2 Malignant neoplasm of upper-inner quadrant of breast  C50.21 Malignant neoplasm of upper-inner quadrant of breast, female  C50.211 Malignant neoplasm of upper-inner quadrant of right female breast  C50.212 Malignant neoplasm of upper-inner quadrant of left female breast  C50.219 Malignant neoplasm of upper-inner quadrant of unspecified female breast  C50.22 Malignant neoplasm of upper-inner quadrant of breast, male  C50.221 Malignant neoplasm of upper-inner quadrant of right male breast  C50.222 Malignant neoplasm of upper-inner quadrant of left male breast  C50.229 Malignant neoplasm of upper-inner quadrant of unspecified male breast    C50.3 Malignant neoplasm of lower-inner quadrant of breast  C50.31 Malignant neoplasm of lower-inner quadrant of breast, female  C50.311 Malignant neoplasm of lower-inner quadrant of right female breast  C50.312 Malignant neoplasm of lower-inner quadrant of left female breast  C50.319 Malignant neoplasm of lower-inner quadrant of unspecified female breast  C50.32 Malignant neoplasm of lower-inner quadrant of breast, male  C50.321 Malignant neoplasm of lower-inner quadrant of right male breast  C50.322 Malignant neoplasm of  lower-inner quadrant of left male breast  C50.329 Malignant neoplasm of lower-inner quadrant of unspecified male breast    C50.4 Malignant neoplasm of upper-outer quadrant of breast  C50.41 Malignant neoplasm of upper-outer quadrant of breast, female  C50.411 Malignant neoplasm of upper-outer quadrant of right female breast  C50.412 Malignant neoplasm of upper-outer quadrant of left female breast  C50.419 Malignant neoplasm of upper-outer quadrant of unspecified female breast  C50.42 Malignant neoplasm of upper-outer quadrant of breast, male  C50.421 Malignant neoplasm of upper-outer quadrant of right male breast  C50.422 Malignant neoplasm of upper-outer quadrant of left male breast  C50.429 Malignant neoplasm of upper-outer quadrant of unspecified male breast    C50.5 Malignant neoplasm of lower-outer quadrant of breast  C50.51 Malignant neoplasm of lower-outer quadrant of breast, female  C50.511 Malignant neoplasm of lower-outer quadrant of right female breast  C50.512 Malignant neoplasm of lower-outer quadrant of left female breast  C50.519 Malignant neoplasm of lower-outer quadrant of unspecified female breast  C50.52 Malignant neoplasm of lower-outer quadrant of breast, male  C50.521 Malignant neoplasm of lower-outer quadrant of right male breast  C50.522 Malignant neoplasm of lower-outer quadrant of left male breast  C50.529 Malignant neoplasm of lower-outer quadrant of unspecified male breast    C50.6 Malignant neoplasm of axillary tail of breast  C50.61 Malignant neoplasm of axillary tail of breast, female  C50.611 Malignant neoplasm of axillary tail of right female breast  C50.612 Malignant neoplasm of axillary tail of left female breast  C50.619 Malignant neoplasm of axillary tail of unspecified female breast  C50.62 Malignant neoplasm of axillary tail of breast, male  C50.621 Malignant neoplasm of axillary tail of right male breast  C50.622 Malignant neoplasm of axillary tail of left male breast  C50.629  Malignant neoplasm of axillary tail of unspecified male breast    C50.8 Malignant neoplasm of overlapping sites of breast  C50.81 Malignant neoplasm of overlapping sites of breast, female  C50.811 Malignant neoplasm of overlapping sites of right female breast  C50.812 Malignant neoplasm of overlapping sites of left female breast  C50.819 Malignant neoplasm of overlapping sites of unspecified female breast  C50.82 Malignant neoplasm of overlapping sites of breast, male  C50.821 Malignant neoplasm of overlapping sites of right male breast  C50.822 Malignant neoplasm of overlapping sites of left male breast  C50.829 Malignant neoplasm of overlapping sites of unspecified male breast

## 2022-12-09 NOTE — ANESTHESIA PREPROCEDURE EVALUATION
Case: [id number] Date/Time: 12/09/22 1115    Procedure: LEFT RE-EXICISION PARTIAL MASTECTOMY    Pre-op diagnosis: PERSONAL HISTORY OF PRIMARY MALIGNANT NEOPLASM OF BREAST    Location: [facility] ROOM 23 / SURGERY SAME DAY [facility]    Surgeons: Mikayla Galaviz M.D.        Hx TIA 1989  Tobacco Abuse  GERD  HTN  Relevant Problems   No relevant active problems       Physical Exam    Airway   Mallampati: II  TM distance: >3 FB  Neck ROM: full       Cardiovascular - normal exam  Rhythm: regular  Rate: normal  (-) murmur     Dental - normal exam           Pulmonary - normal exam  Breath sounds clear to auscultation     Abdominal    Neurological - normal exam                 Anesthesia Plan    ASA 3   ASA physical status 3 criteria: CVA or TIA - history (> 3 months)    Plan - general       Airway plan will be LMA          Induction: intravenous    Postoperative Plan: Postoperative administration of opioids is intended.    Pertinent diagnostic labs and testing reviewed    Informed Consent:    Anesthetic plan and risks discussed with patient.    Use of blood products discussed with: patient whom consented to blood products.

## 2022-12-09 NOTE — DISCHARGE INSTRUCTIONS
Discharge Instructions, Lumpectomy:     Care of Your Incisions:   You can shower with the dressing on as it is waterproof.  Avoid getting lotions, powders or deodorant on the incision while it is healing.   Don’t worry if the area under either incision feels firm or hard. This is normal and usually softens within a few months.     How to Manage Discomfort After Surgery:   It is normal to have tenderness, discomfort or mild swelling at the site of the incisions.      You may also feel:  - Numbness at the incisions.  - Occasional shooting pains in the breast as you heal.      You may be given a prescription for pain medication prior to being discharged from the hospital. If you are having pain, take the medication as directed by your physician and by the label directions. You should be comfortable and moving around. Most women use some prescription pain medication, though some need only over the counter pain medication, such as ibuprofen (Motrin) or acetaminophen (Tylenol). Some women have little pain and take nothing at all. Whatever amount of pain you have, it is important to listen to your body and use the medication if needed during your recovery.      Prescription pain medication may cause constipation. If you are having problems, use what you normally would or call your nurse for suggestions. It also helps to stay regular by including fiber in your diet (for example: bran or fruits and vegetables) and drink plenty of liquids (water, juice, etc.).     Activity:   You may resume your normal routine, but avoid heavy lifting (over 10 pounds), pushing or pulling until your first post-operative visit, unless otherwise specified by your surgeon.   Do not drive for at least 24-48 hours after surgery (unless otherwise directed by your surgeon), or while you are taking prescription pain medicine. Prescription pain medicine causes drowsiness (makes you sleepy) so you should avoid doing any tasks where you should be awake  and alert (driving, operating machinery, etc).     When to Call Your Doctor/Nurse:   If you have a fever greater than 100.5, increased pain, redness or warmth at the area around the incision, drainage from the incision or around the drain, or swelling of the arm. Call Dr. Galaviz's office at [phone] with any other questions or concerns.     You should have an appointment to see Dr. Galaviz about a week after surgery, call [phone] if you do not already have an appointment.     Office address:  [address] [place] [address] [address]  [place] [address]        Mikayla Galaviz M.D.  [facility]  [phone]        If any questions arise, call your provider.  If your provider is not available, please feel free to call the Surgical Center at [phone].    MEDICATIONS: Resume taking daily medication.  Take prescribed pain medication with food.  If no medication is prescribed, you may take non-aspirin pain medication if needed.  PAIN MEDICATION CAN BE VERY CONSTIPATING.  Take a stool softener or laxative such as senokot, pericolace, or milk of magnesia if needed.    Last pain medication given at Oxycodone 5 mg solution at 11:55 am    What to Expect Post Anesthesia    Rest and take it easy for the first 24 hours.  A responsible adult is recommended to remain with you during that time.  It is normal to feel sleepy.  We encourage you to not do anything that requires balance, judgment or coordination.    FOR 24 HOURS DO NOT:  Drive, operate machinery or run household appliances.  Drink beer or alcoholic beverages.  Make important decisions or sign legal documents.    To avoid nausea, slowly advance diet as tolerated, avoiding spicy or greasy foods for the first day.  Add more substantial food to your diet according to your provider's instructions. INCREASE FLUIDS AND FIBER TO AVOID CONSTIPATION.    MILD FLU-LIKE SYMPTOMS ARE NORMAL.  YOU MAY EXPERIENCE GENERALIZED MUSCLE ACHES, THROAT IRRITATION, HEADACHE AND/OR SOME  NAUSEA.

## 2022-12-09 NOTE — OR NURSING
1247 pm - Pt fully awake, A, O x 4, denies n/v, reported pain scale in the left breast at 2/10, surgical site CDI, open to air, no drainage noted.    Pt ambulated from the bedside to the toilet, well tolerated. Pt voided. Independently changed her clothes.    PIV removed with tip intact. Discharge instructions discussed with  rosa and pt. All questions answered. Verbalized understanding.     Pt escorted out of department in wheelchair with all belongings. Discharged home to responsible adult.

## 2022-12-27 PROBLEM — C50.412 BREAST CANCER OF UPPER-OUTER QUADRANT OF LEFT FEMALE BREAST (HCC): Status: ACTIVE | Noted: 2022-12-27

## 2023-01-03 ENCOUNTER — HOSPITAL ENCOUNTER (OUTPATIENT)
Dept: RADIATION ONCOLOGY | Facility: MEDICAL CENTER | Age: 58
End: 2023-01-31
Attending: RADIOLOGY
Payer: COMMERCIAL

## 2023-01-03 VITALS
HEIGHT: 62 IN | WEIGHT: 125 LBS | DIASTOLIC BLOOD PRESSURE: 97 MMHG | BODY MASS INDEX: 23 KG/M2 | SYSTOLIC BLOOD PRESSURE: 152 MMHG | HEART RATE: 101 BPM | OXYGEN SATURATION: 98 %

## 2023-01-03 DIAGNOSIS — C50.412 MALIGNANT NEOPLASM OF UPPER-OUTER QUADRANT OF LEFT BREAST IN FEMALE, ESTROGEN RECEPTOR NEGATIVE (HCC): ICD-10-CM

## 2023-01-03 DIAGNOSIS — Z17.1 MALIGNANT NEOPLASM OF UPPER-OUTER QUADRANT OF LEFT BREAST IN FEMALE, ESTROGEN RECEPTOR NEGATIVE (HCC): ICD-10-CM

## 2023-01-03 PROCEDURE — 99214 OFFICE O/P EST MOD 30 MIN: CPT | Performed by: RADIOLOGY

## 2023-01-03 PROCEDURE — 99205 OFFICE O/P NEW HI 60 MIN: CPT | Performed by: RADIOLOGY

## 2023-01-03 RX ORDER — IBUPROFEN 400 MG/1
800 TABLET ORAL 2 TIMES DAILY
COMMUNITY

## 2023-01-03 RX ORDER — LEVOCETIRIZINE DIHYDROCHLORIDE 5 MG/1
TABLET, FILM COATED ORAL
COMMUNITY

## 2023-01-03 RX ORDER — CALCIUM CARBONATE-CHOLECALCIFEROL TAB 250 MG-125 UNIT 250-125 MG-UNIT
1 TAB ORAL DAILY
COMMUNITY

## 2023-01-03 ASSESSMENT — PAIN SCALES - GENERAL: PAINLEVEL: 2=MINIMAL-SLIGHT

## 2023-01-03 ASSESSMENT — FIBROSIS 4 INDEX: FIB4 SCORE: 1.32

## 2023-01-03 NOTE — CONSULTS
RADIATION ONCOLOGY CONSULT    DATE OF SERVICE: 1/3/2023    IDENTIFICATION: A 57 y.o. female with [id number]. 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  Status post reexcision after lumpectomy with a positive margin over 1 mm breath on the lateral margin.  [place] lymph nodes negative.  She is here at the kind request of Dr. Mikayla Galaviz.      HISTORY OF PRESENT ILLNESS:  patient's history dates back to last summer when she was at work lifting boxes and was developing anterior chest pain.   CT of the chest was done 8/31/2022 showing multiple subacute to chronic appearing right-sided rib fractures.  Chronic appearing segmental fracture of the sternum.  Emphysema.  Apparent suspicious enhancing mass in the upper outer quadrant of the left breast recommend further evaluation with diagnostic mammogram and ultrasound.  Diagnostic mammogram and ultrasound were done on 10/19/2022 showing an ill-defined mass with architectural distortion measuring 1.9 x 2.4 x 1.2 in the left upper outer quadrant.  The right axilla showed a lymph node on CT but was negative on ultrasound but they recommended 6-month follow-up.  There was also a clip in the right breast from a prior benign biopsy.  She underwent a biopsy of the left upper outer quadrant breast on 10/19/2022.  This revealed DCIS grade 2-3 that was microinvasive disease ER/ME negative.  She then underwent a lumpectomy and sentinel node dissection.  3 nodes were negative.  Margins of the invasive cancer were negative but there was multifocal positivity of the DCIS on the anterior and lateral margins.  There was also LVI.  Tumor was HER2 0 making her triple negative breast cancer.  There were 2 sites of invasive disease measuring 0.3.  She underwent a reexcision  on 12/9/2022 there was still a positive lateral margin over 1 mm.   She plans to see Dr. Martin about systemic management on Thursday.  She is here to discuss  "radiation therapy to decrease her chance of local regional recurrence.    PAST MEDICAL HISTORY:   Past Medical History:   Diagnosis Date    Cancer (HCC)     Carcinoma in situ of respiratory system     Diabetes (HCC)     Prediabetes    Disorder of thyroid     Thyroid Nodule    Emphysema of lung (HCC)     GERD (gastroesophageal reflux disease)     Heart burn     Hiatus hernia syndrome 11/07/2022    Hypertension     Indigestion     Osteoporosis     Stroke (HCC)     TIA (transient ischemic attack) 1989    pt states \"I have been diagnosed with mini strokes\"       PAST SURGICAL HISTORY:  Past Surgical History:   Procedure Laterality Date    PB MASTECTOMY, PARTIAL Left 12/9/2022    Procedure: LEFT RE-EXICISION PARTIAL MASTECTOMY;  Surgeon: Mikayla Galaviz M.D.;  Location: SURGERY SAME DAY [facility];  Service: General    PB MASTECTOMY, PARTIAL Left 11/16/2022    Procedure: LEFT PARTIAL MASTECTOMY WITH LEFT AXILLARY SENTINEL LYMPH NODE BIOPSY;  Surgeon: Mikayla Galaviz M.D.;  Location: SURGERY SAME DAY [facility];  Service: General    BREAST BIOPSY Right 2014    BIOPSY GENERAL      thyroid    OTHER ORTHOPEDIC SURGERY      PRIMARY C SECTION      x2       GYNECOLOGICAL STATUS:  G2, P3    HRT: No  BCP: 7 years    CURRENT MEDICATIONS:  Current Outpatient Medications   Medication Sig Dispense Refill    ibuprofen (MOTRIN) 400 MG Tab Take 800 mg by mouth 2 times a day.      multivitamin Tab Take 1 Tablet by mouth every day.      calcium-vitamin D 250-3.125 MG-MCG Tab tablet Take 1 Tablet by mouth every day.      Levocetirizine Dihydrochloride (XYZAL ALLERGY 24HR) 5 MG Tab Take  by mouth.      pantoprazole (PROTONIX) 40 MG Tablet Delayed Response Take 40 mg by mouth every day.      famotidine (PEPCID) 20 MG Tab Take 1 Tablet by mouth every evening.      levothyroxine (SYNTHROID) 25 MCG Tab every evening.      amLODIPine (NORVASC) 5 MG Tab every evening.      Acetaminophen-Codeine 300-30 MG Tab Take  by mouth as needed.      " "Levocetirizine Dihydrochloride 5 MG Tab Take  by mouth every day.       No current facility-administered medications for this encounter.       ALLERGIES:    Hydrocodone    FAMILY HISTORY:    Family History   Problem Relation Age of Onset    Cancer Mother         Esophageal- lung    Lung Disease Mother    [unfilled]        SOCIAL HISTORY:     reports that she has been smoking cigarettes. She has been smoking an average of .25 packs per day. She has been exposed to tobacco smoke. She has never used smokeless tobacco. She reports current alcohol use of about 2.4 oz per week. She reports current drug use.  Patient is a 57 year old female who resides in [place] with her Spouse. She works as a .     REVIEW OF SYSTEMS: Pertinent positives consist of    Pain in the axilla.  She has thyroid disease and is followed by Dr. Les Montana.  She has urinary frequency neck pain neck stiffness unsteady gait history of stroke  The rest of the review of systems is negative and has been reviewed.     PAIN:   Pain Scale: 0-10  Pain Assessement: Initial  Pain Location, Orientation and Scale: Axilla: Left : Acute : 2 and Breast: Left : Acute : 2  What makes the pain better: Arm sling  What makes the pain worse: s/p surgery/ movement        PHYSICAL EXAM:    0= Fully active, able to carry on all pre-disease performance without restriction.  Vitals:    01/03/23 1240   BP: (!) 152/97   BP Location: Right arm   Patient Position: Sitting   BP Cuff Size: Adult   Pulse: (!) 101   SpO2: 98%   Weight: 56.7 kg (125 lb)   Height: 1.575 m (5' 2\")   Pain Score: 2=Minimal-Slight        GENERAL: .  Well-appearing alert and oriented x3 in no major distress   breasts: Bilaterally symmetrical left breast has evidence of the recent lumpectomy.  No discrete masses appreciated in either breast or axilla.  She does have pain in the left axilla at the axillary scar site  HEENT:  Pupils are equal, round, and reactive to light.  " Extraocular muscles   are intact. Sclerae nonicteric.  Conjunctivae pink.  Oral cavity, tongue   protrudes midline.   NECK:   No peripheral adenopathy of the neck, supraclavicular fossa or axillae   bilaterally.  LUNGS:  Clear to ascultation   HEART:  Regular rate and rhythm.  No murmur appreciated  ABDOMEN:  Soft. No evidence of hepatosplenomegaly.    EXTREMITIES:  Without Edema.  NEUROLOGIC:  Cranial nerves II through XII were intact. Normal stance and gait motor and sensory grossly within normal limits            IMPRESSION:    A 57 y.o. with  [id number]. 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  Status post reexcision after lumpectomy with a positive margin over 1 mm breath on the lateral margin.  [place] lymph nodes negative.      RECOMMENDATIONS:   I had a long discussion with the patient and her  regarding diagnosis prognosis and treatment.  I told her she has a tumor that is not responsive to systemic management that is not chemotherapy.  They know that they will be discussing chemotherapy pros and cons with Dr. Martin.  I am not sure if he will recommend and or not but I did say that she has multifocal and that might push him over for consideration of treatment.  I do not think she really needs a reexcision unless the surgeon is concerned about the margin.  I explained that radiation therapy is used for microscopic positive margins we can just treat with an extra boost of radiation therapy.    I've described the details of radiation along with the side effects both acute and chronic, including but not exclusive to fatigue, skin reaction, local soreness, swelling, delayed healing, scarring fibrosis discoloration. Ample time was allowed for questions, and patient understands.      We have her tentatively scheduled for a simulation in a couple weeks to get started soon thereafter if chemotherapy is not recommended.  She understands that if it is  radiation therapy would be delivered following chemotherapy.    Thank you for the opportunity to participate in her care.  If any questions or comments, please do not hesitate in calling.    Please note that this dictation was created using voice recognition software. I have made every reasonable attempt to correct obvious errors, but I expect that there are errors of grammar and possibly content that I did not discover before finalizing the note.

## 2023-01-03 NOTE — CT SIMULATION
PATIENT NAME Alexus Saldaña   PRIMARY PHYSICIAN Marcela Montana [id number]   REFERRING PHYSICIAN Mikayla Galaviz M.D. 1965     Breast cancer of upper-outer quadrant of left female breast (HCC)  Staging form: Breast, AJCC 8th Edition  - Pathologic: Stage IB (pT1a, pN0(sn), cM0, G2, ER-, GA-, HER2-) - Signed by Merced Holland M.D. on 12/27/2022  Stage prefix: Initial diagnosis  Method of lymph node assessment: [place] lymph node biopsy  Histologic grading system: 3 grade system         Treatment Planning CT Simulation        Order Questions       Question Answer Comment    Is this for a new course of treatment? Yes     Is this an Addendum? No     Implanted Device/Pacemaker No     Simulation Status Initial     Treatment Technique 3D CRT     Treatment Pattern/Frequency Daily     Concurrent Chemotherapy No     CT Technique 3D possible     DIBH     Slice Thickness 3mm     Scan Extent Chest     Treatment Device(s) Vac [place]      Wing Board     Treatment Marker Scar      Breast Borders     Patient Attire Gown     Patient Position Supine     Patient Orientation Head First     Arm Position Up     Treatment Machine No preference     Treatment Image Guidance Ports for boost     CBCT     Frequency (ports) Weekly     Frequency (CBCT) Daily for boost    Image Guidance Match PTV - Soft Tissue     Other Orders Weekly Physics Check

## 2023-01-03 NOTE — PROGRESS NOTES
"Patient was seen today in clinic with Dr. Holland for consultation.  Vitals signs and weight were obtained and pain assessment was completed.  Allergies and medications were reviewed with the patient.      Vitals/Pain:  Vitals:    01/03/23 1240   BP: (!) 152/97   BP Location: Right arm   Patient Position: Sitting   BP Cuff Size: Adult   Pulse: (!) 101   SpO2: 98%   Weight: 56.7 kg (125 lb)   Height: 1.575 m (5' 2\")   Pain Score: 2=Minimal-Slight        Allergies:   Hydrocodone    Current Medications:  Current Outpatient Medications   Medication Sig Dispense Refill    ibuprofen (MOTRIN) 400 MG Tab Take 800 mg by mouth 2 times a day.      multivitamin Tab Take 1 Tablet by mouth every day.      calcium-vitamin D 250-3.125 MG-MCG Tab tablet Take 1 Tablet by mouth every day.      Levocetirizine Dihydrochloride (XYZAL ALLERGY 24HR) 5 MG Tab Take  by mouth.      pantoprazole (PROTONIX) 40 MG Tablet Delayed Response Take 40 mg by mouth every day.      famotidine (PEPCID) 20 MG Tab Take 1 Tablet by mouth every evening.      levothyroxine (SYNTHROID) 25 MCG Tab every evening.      amLODIPine (NORVASC) 5 MG Tab every evening.      Acetaminophen-Codeine 300-30 MG Tab Take  by mouth as needed.      Levocetirizine Dihydrochloride 5 MG Tab Take  by mouth every day.       No current facility-administered medications for this encounter.         PCP:  Rubén Gu R.N.   "

## 2023-01-06 ENCOUNTER — PATIENT OUTREACH (OUTPATIENT)
Dept: OTHER | Facility: MEDICAL CENTER | Age: 58
End: 2023-01-06
Payer: COMMERCIAL

## 2023-01-06 NOTE — PROGRESS NOTES
"Pt phones tearful.  She states she is reluctant to undergo chemotherapy stating \"I don't think my body is well enough\".  Pt states she is scheduled with Dr. Martin to review results of an upcoming scan on 1/16/23.  She states she is overwhelmed with all of the information regarding her diagnosis and treatment.  Pt interested in a second opinion to better understand her treatment options.  She reports her hair is falling out.  She states she has experienced this in the past but it has gotten worse.  Pt is being treated by an endocrinologist for a thyroid disorder. Pt also reports she is not sleeping well.   Encouraged pt to schedule an appointment with her PCP.  Pt agrees.  Discussed counseling.  Pt very interested.  Provided contact information for [facility] [phone].  Pt states she will call and make these appointments today.  Encouraged pt contact [facility] should any further questions or concerns arise.        "

## 2023-01-17 ENCOUNTER — HOSPITAL ENCOUNTER (OUTPATIENT)
Dept: RADIOLOGY | Facility: MEDICAL CENTER | Age: 58
End: 2023-01-17
Attending: INTERNAL MEDICINE
Payer: COMMERCIAL

## 2023-01-17 DIAGNOSIS — C50.412 MALIGNANT NEOPLASM OF UPPER-OUTER QUADRANT OF LEFT FEMALE BREAST, UNSPECIFIED ESTROGEN RECEPTOR STATUS (HCC): ICD-10-CM

## 2023-01-17 PROCEDURE — 74160 CT ABDOMEN W/CONTRAST: CPT

## 2023-01-17 PROCEDURE — 700117 HCHG RX CONTRAST REV CODE 255: Performed by: INTERNAL MEDICINE

## 2023-01-17 RX ADMIN — IOHEXOL 20 ML: 240 INJECTION, SOLUTION INTRATHECAL; INTRAVASCULAR; INTRAVENOUS; ORAL at 13:12

## 2023-01-17 RX ADMIN — IOHEXOL 100 ML: 350 INJECTION, SOLUTION INTRAVENOUS at 13:13

## 2023-03-29 DIAGNOSIS — Z17.1 MALIGNANT NEOPLASM OF UPPER-OUTER QUADRANT OF LEFT BREAST IN FEMALE, ESTROGEN RECEPTOR NEGATIVE (HCC): ICD-10-CM

## 2023-03-29 DIAGNOSIS — C50.412 MALIGNANT NEOPLASM OF UPPER-OUTER QUADRANT OF LEFT BREAST IN FEMALE, ESTROGEN RECEPTOR NEGATIVE (HCC): ICD-10-CM

## 2023-03-29 NOTE — CT SIMULATION
PATIENT NAME Alexus Saldaña   PRIMARY PHYSICIAN Marcela Montana [id number]   REFERRING PHYSICIAN No ref. provider found 1965     Breast cancer of upper-outer quadrant of left female breast (HCC)  Staging form: Breast, AJCC 8th Edition  - Pathologic: Stage IB (pT1a, pN0(sn), cM0, G2, ER-, [place]-, HER2-) - Signed by Merced Holland M.D. on 12/27/2022  Stage prefix: Initial diagnosis  Method of lymph node assessment: [place] lymph node biopsy  Histologic grading system: 3 grade system         Treatment Planning CT Simulation        Order Questions       Question Answer Comment    Is this for a new course of treatment? Yes     Is this an Addendum? No     Implanted Device/Pacemaker No     Simulation Status Initial     Treatment Technique 3D CRT     Treatment Pattern/Frequency Daily     Concurrent Chemotherapy No     CT Technique 3D possible     DIBH     Slice Thickness 3mm     Scan Extent Chest     Treatment Device(s) Vac [place]      Wing Board     Treatment Marker Scar      Breast Borders     Patient Attire Gown     Patient Position Supine     Patient Orientation Head First     Arm Position Up     Treatment Machine No preference     Treatment Image Guidance Ports for boost     CBCT     Frequency (ports) Weekly     Frequency (CBCT) Daily for boost    Image Guidance Match PTV - Soft Tissue     Other Orders Special Tx Procedure      Weekly Physics Check

## 2023-05-01 ENCOUNTER — HOSPITAL ENCOUNTER (OUTPATIENT)
Dept: RADIATION ONCOLOGY | Facility: MEDICAL CENTER | Age: 58
End: 2023-05-31
Attending: RADIOLOGY
Payer: COMMERCIAL

## 2023-05-01 ENCOUNTER — PATIENT OUTREACH (OUTPATIENT)
Dept: OTHER | Facility: MEDICAL CENTER | Age: 58
End: 2023-05-01
Payer: COMMERCIAL

## 2023-05-01 NOTE — PROGRESS NOTES
Oncology Nurse Navigation   Phoned pt for follow up.  Pt states her last chemo was a little a harder than the previous cycles.  She is scheduled for simulation on 5/17/23.  She is back to work and would prefer afternoon appointments.  Requested she let her radiation therapists know.  She denies any barriers at this time.

## 2023-05-24 ENCOUNTER — HOSPITAL ENCOUNTER (OUTPATIENT)
Dept: RADIATION ONCOLOGY | Facility: MEDICAL CENTER | Age: 58
End: 2023-05-24

## 2023-05-24 PROCEDURE — 77334 RADIATION TREATMENT AID(S): CPT | Performed by: RADIOLOGY

## 2023-05-24 PROCEDURE — 77290 THER RAD SIMULAJ FIELD CPLX: CPT | Mod: 26 | Performed by: RADIOLOGY

## 2023-05-24 PROCEDURE — 77470 SPECIAL RADIATION TREATMENT: CPT | Performed by: RADIOLOGY

## 2023-05-24 PROCEDURE — 77334 RADIATION TREATMENT AID(S): CPT | Mod: 26 | Performed by: RADIOLOGY

## 2023-05-24 PROCEDURE — 77290 THER RAD SIMULAJ FIELD CPLX: CPT | Performed by: RADIOLOGY

## 2023-05-24 PROCEDURE — 77263 THER RADIOLOGY TX PLNG CPLX: CPT | Performed by: RADIOLOGY

## 2023-05-24 PROCEDURE — 77470 SPECIAL RADIATION TREATMENT: CPT | Mod: 26 | Performed by: RADIOLOGY

## 2023-05-24 NOTE — RADIATION COMPLETION NOTES
PATIENT NAME Alexus Saldaña   PRIMARY PHYSICIAN Marcela Montana CHAVEZ [id number]   REFERRING PHYSICIAN No ref. provider found 1965       DATE OF SERVICE: 5/24/2023    DIAGNOSIS:  Breast cancer of upper-outer quadrant of left female breast (HCC)  Staging form: Breast, AJCC 8th Edition  - Pathologic: Stage IB (pT1a, pN0(sn), cM0, G2, ER-, [place]-, HER2-) - Signed by Merced Holland M.D. on 12/27/2022  Stage prefix: Initial diagnosis  Method of lymph node assessment: [place] lymph node biopsy  Histologic grading system: 3 grade system       SPECIAL TREATMENT PROCEDURE NOTE:  Considerable additional effort required in the management of this case because of administration of sequential  TC chemotherapy which may result in increased normal tissue toxicity. This includes longer and possibly more frequent on treatment visits.

## 2023-05-24 NOTE — RADIATION COMPLETION NOTES
Clinical Treatment Planning Note    DATE OF SERVICE: 5/24/2023    DIAGNOSIS:  Breast cancer of upper-outer quadrant of left female breast (HCC)  Staging form: Breast, AJCC 8th Edition  - Pathologic: Stage IB (pT1a, pN0(sn), cM0, G2, ER-, [place]-, HER2-) - Signed by Merced Holland M.D. on 12/27/2022  Stage prefix: Initial diagnosis  Method of lymph node assessment: [place] lymph node biopsy  Histologic grading system: 3 grade system         IMAGING REVIEWED:  [] CT     [] MRI     [] PET/CT     [] BONE SCAN     [x] MAMMO     [] OTHER      TREATMENT INTENT:   [x] CURATIVE     [] MAINTENANCE     []  PALLIATIVE      []  SUPPORTIVE     []  PROPHYLACTIC     [] BENIGN     []  CONSOLIDATIVE      [] DEFINITIVE   []  OLOGIMETASTATIC      LINE OF TREATMENT:  [x] ADJUVANT   [] DEFINITIVE   [] NEOADJUVANT   [] RE-TREATMENT      TECHNIQUE PLANNED:  [] IMRT   [x] 3D   [] SBRT   [] SRS/SRT   [] HDR   [] ELECTRON       IMRT JUSTIFICATION:  []   An immediately adjacent area has been previously irradiated and abutting portals must be established with high precision.    []  Dose escalation is planned to deliver radiation doses in excess of those commonly utilized for similar tumors with conventional treatment.    []  The target volume is concave or convex, and the critical normal tissues are within or around that convexity or concavity.    []  The target volume is in close proximity to critical structures that must be protected.    []  The volume of interest must be covered with narrow margins to adequately protect  immediately adjacent structures.      FIELDS & BLOCKING:  [x] COMPLEX BLOCKS     []  = 3 TX AREAS     []  ARCS     []  CUSTOM SHEILD        []  SIMPLE BLOCK      CHEMOTHERAPY:  []  CONCURRENT     []  INDUCTION     [x] SEQUENTIAL     []  <30 DAYS FROM XRT      NOTES:    OAR CONSTRAINTS: (GUIDELINES ONLY NOT ABSOLUTE)    Target Prescribed Coverage   PTV 95% of PTV covered by 95% (cGy) of RX Dose       JENNIFER Goal   Max point  dose PTV Eval <=110%   Contralateral Breast V5% < 2Gy   Ipsilateral Lung V15% < 20Gy   Ipsilateral Lung V35% < 10Gy   Ipsilateral Lung V50% < 5Gy   Contralateral Lung V10% < 5Gy   Heart (L Sided) V5% < 20Gy   *RTOG 1005, START-A, START-B

## 2023-05-24 NOTE — RADIATION COMPLETION NOTES
DATE OF SERVICE: 5/24/2023    DIAGNOSIS:  Breast cancer of upper-outer quadrant of left female breast (HCC)  Staging form: Breast, AJCC 8th Edition  - Pathologic: Stage IB (pT1a, pN0(sn), cM0, G2, ER-, [place]-, HER2-) - Signed by Merced Holland M.D. on 12/27/2022  Stage prefix: Initial diagnosis  Method of lymph node assessment: [place] lymph node biopsy  Histologic grading system: 3 grade system       DATE OF SERVICE: 5/24/2023    TYPE OF SIMULATION: Breast       [] Right    [x] Left      GOAL OF TREATMENT:   [x] Curative  [] Palliative  [] Oligometastatic    COMPLEX:  [x] Complex Blocking   []Arcs  [] Custom Blocks  [] >3 Sites    PROCEDURE: Patient placed in supine position on wing board with VAC PRINCE bag with appropriate slant to compensate for slope of chest wall.  Breast/chest wall borders marked CT scan obtained to contain entire volume of interest.      I have personally reviewed the relevant data, performed the target localization, and determined all relevant factors for this patient’s simulation.

## 2023-05-26 PROCEDURE — 77334 RADIATION TREATMENT AID(S): CPT | Performed by: RADIOLOGY

## 2023-05-26 PROCEDURE — 77334 RADIATION TREATMENT AID(S): CPT | Mod: 26 | Performed by: RADIOLOGY

## 2023-05-26 PROCEDURE — 77295 3-D RADIOTHERAPY PLAN: CPT | Performed by: RADIOLOGY

## 2023-05-26 PROCEDURE — 77295 3-D RADIOTHERAPY PLAN: CPT | Mod: 26 | Performed by: RADIOLOGY

## 2023-05-26 PROCEDURE — 77300 RADIATION THERAPY DOSE PLAN: CPT | Mod: 26 | Performed by: RADIOLOGY

## 2023-05-26 PROCEDURE — 77300 RADIATION THERAPY DOSE PLAN: CPT | Performed by: RADIOLOGY

## 2023-05-31 ENCOUNTER — HOSPITAL ENCOUNTER (OUTPATIENT)
Dept: RADIATION ONCOLOGY | Facility: MEDICAL CENTER | Age: 58
End: 2023-05-31

## 2023-05-31 LAB
CHEMOTHERAPY INFUSION START DATE: NORMAL
CHEMOTHERAPY RECORDS: 2.67
CHEMOTHERAPY RECORDS: 4005
CHEMOTHERAPY RECORDS: NORMAL
CHEMOTHERAPY RX CANCER: NORMAL
DATE 1ST CHEMO CANCER: NORMAL
RAD ONC ARIA COURSE LAST TREATMENT DATE: NORMAL
RAD ONC ARIA COURSE TREATMENT ELAPSED DAYS: NORMAL
RAD ONC ARIA REFERENCE POINT DOSAGE GIVEN TO DATE: 2.67
RAD ONC ARIA REFERENCE POINT DOSAGE GIVEN TO DATE: 2.67
RAD ONC ARIA REFERENCE POINT ID: NORMAL
RAD ONC ARIA REFERENCE POINT ID: NORMAL
RAD ONC ARIA REFERENCE POINT SESSION DOSAGE GIVEN: 2.67
RAD ONC ARIA REFERENCE POINT SESSION DOSAGE GIVEN: 2.67

## 2023-05-31 PROCEDURE — 77280 THER RAD SIMULAJ FIELD SMPL: CPT | Performed by: RADIOLOGY

## 2023-05-31 PROCEDURE — 77412 RADIATION TX DELIVERY LVL 3: CPT | Performed by: RADIOLOGY

## 2023-05-31 PROCEDURE — 77280 THER RAD SIMULAJ FIELD SMPL: CPT | Mod: 26 | Performed by: RADIOLOGY

## 2023-05-31 NOTE — ON TREATMENT VISIT
ON TREATMENT NOTE  RADIATION ONCOLOGY DEPARTMENT    Patient name:  Alexus Saldaña    Primary Physician:  Marcela Montana M.D. MRN: [id number]  CSN: [id number]   Referring physician:  Mikayla Galaviz M.D. : 1965, 58 y.o.     ENCOUNTER DATE:  23    DIAGNOSIS:    Breast cancer of upper-outer quadrant of left female breast (HCC)  Staging form: Breast, AJCC 8th Edition  - Pathologic: Stage IB (pT1a, pN0(sn), cM0, G2, ER-, [place]-, HER2-) - Signed by Merced Holland M.D. on 2022  Stage prefix: Initial diagnosis  Method of lymph node assessment: [place] lymph node biopsy  Histologic grading system: 3 grade system      TREATMENT SUMMARY:  Radiation Treatments       Active   Plans   L Breast   Most recent treatment: Dose planned: 267 cGy (fraction 1 of 15 on 2023)   Total: Dose planned: 4,005 cGy   Elapsed Days: 0 @ [id number]      Reference Points   L Breast   Most recent treatment: Dose given: 267 cGy (on 2023)   Total: Dose given: 267 cGy   Elapsed Days: 0 @ [id number]      L Breast CP   Most recent treatment: Dose given: 267 cGy (on 2023)   Total: Dose given: 267 cGy   Elapsed Days: 0 @ [id number]                      SUBJECTIVE:   Tolerated first treatment without difficulty      VITAL SIGNS:  There were no vitals taken for this visit.          1/3/2023    12:40 PM   Pain Assessment   Pain Score 2=MINIMAL PA   Pain Loc BREAST          PHYSICAL EXAM:    No erythema         No data to display                  IMPRESSION:  Cancer Staging   Breast cancer of upper-outer quadrant of left female breast (HCC)  Staging form: Breast, AJCC 8th Edition  - Pathologic: Stage IB (pT1a, pN0(sn), cM0, G2, ER-, [place]-, HER2-) - Signed by Merced Holland M.D. on 2022      PLAN:  No change in treatment plan    Disposition:  Treatment plan reviewed. Questions answered. Continue therapy outlined.     Merced Holland M.D.    No orders of the defined types were placed in this  encounter.

## 2023-05-31 NOTE — CT SIMULATION
DATE OF SERVICE: 5/31/2023    Radiation Therapy Episodes       Active Episodes       Radiation Therapy: 3D CRT    Radiation Therapy: 3D CRT                   Radiation Treatments         Plan Last Treated On Elapsed Days Fractions Treated Prescribed Fraction Dose (cGy) Prescribed Total Dose (cGy)    L Breast 5/31/2023 0 @ [id number] 1 of 15 267 4,005                  Reference Point Last Treated On Elapsed Days Most Recent Session Dose (cGy) Total Dose (cGy)    L Breast 5/31/2023 0 @ [id number] 267 267    L Breast CP 5/31/2023 0 @ [id number] 267 267                            First Visit Simple Simulation: Called by [employer] machine to verify treatment parameters including:  treatment site, treatment dose, and treatment setup prior to first treatment. Image derived shifts reviewed in all appropriate planes.  Shifts approved.  Patient treated.    I have personally reviewed the relevant data, performed the target localization, and determined all relevant factors for this patient’s simulation.

## 2023-06-01 ENCOUNTER — HOSPITAL ENCOUNTER (OUTPATIENT)
Dept: RADIATION ONCOLOGY | Facility: MEDICAL CENTER | Age: 58
End: 2023-06-30
Attending: RADIOLOGY
Payer: COMMERCIAL

## 2023-06-01 ENCOUNTER — HOSPITAL ENCOUNTER (OUTPATIENT)
Dept: RADIATION ONCOLOGY | Facility: MEDICAL CENTER | Age: 58
End: 2023-06-01
Payer: COMMERCIAL

## 2023-06-01 LAB
CHEMOTHERAPY INFUSION START DATE: NORMAL
CHEMOTHERAPY RECORDS: 2.67
CHEMOTHERAPY RECORDS: 4005
CHEMOTHERAPY RECORDS: NORMAL
CHEMOTHERAPY RX CANCER: NORMAL
DATE 1ST CHEMO CANCER: NORMAL
RAD ONC ARIA COURSE LAST TREATMENT DATE: NORMAL
RAD ONC ARIA COURSE TREATMENT ELAPSED DAYS: NORMAL
RAD ONC ARIA REFERENCE POINT DOSAGE GIVEN TO DATE: 5.34
RAD ONC ARIA REFERENCE POINT DOSAGE GIVEN TO DATE: 5.34
RAD ONC ARIA REFERENCE POINT ID: NORMAL
RAD ONC ARIA REFERENCE POINT ID: NORMAL
RAD ONC ARIA REFERENCE POINT SESSION DOSAGE GIVEN: 2.67
RAD ONC ARIA REFERENCE POINT SESSION DOSAGE GIVEN: 2.67

## 2023-06-01 PROCEDURE — 77412 RADIATION TX DELIVERY LVL 3: CPT | Performed by: RADIOLOGY

## 2023-06-02 ENCOUNTER — HOSPITAL ENCOUNTER (OUTPATIENT)
Dept: RADIATION ONCOLOGY | Facility: MEDICAL CENTER | Age: 58
End: 2023-06-02
Payer: COMMERCIAL

## 2023-06-02 LAB
CHEMOTHERAPY INFUSION START DATE: NORMAL
CHEMOTHERAPY RECORDS: 2.67
CHEMOTHERAPY RECORDS: 4005
CHEMOTHERAPY RECORDS: NORMAL
CHEMOTHERAPY RX CANCER: NORMAL
DATE 1ST CHEMO CANCER: NORMAL
RAD ONC ARIA COURSE LAST TREATMENT DATE: NORMAL
RAD ONC ARIA COURSE TREATMENT ELAPSED DAYS: NORMAL
RAD ONC ARIA REFERENCE POINT DOSAGE GIVEN TO DATE: 8.01
RAD ONC ARIA REFERENCE POINT DOSAGE GIVEN TO DATE: 8.01
RAD ONC ARIA REFERENCE POINT ID: NORMAL
RAD ONC ARIA REFERENCE POINT ID: NORMAL
RAD ONC ARIA REFERENCE POINT SESSION DOSAGE GIVEN: 2.67
RAD ONC ARIA REFERENCE POINT SESSION DOSAGE GIVEN: 2.67

## 2023-06-02 PROCEDURE — 77412 RADIATION TX DELIVERY LVL 3: CPT | Performed by: RADIOLOGY

## 2023-06-02 PROCEDURE — 77336 RADIATION PHYSICS CONSULT: CPT | Performed by: RADIOLOGY

## 2023-06-05 ENCOUNTER — HOSPITAL ENCOUNTER (OUTPATIENT)
Dept: RADIATION ONCOLOGY | Facility: MEDICAL CENTER | Age: 58
End: 2023-06-05
Payer: COMMERCIAL

## 2023-06-05 LAB
CHEMOTHERAPY INFUSION START DATE: NORMAL
CHEMOTHERAPY RECORDS: 2.67
CHEMOTHERAPY RECORDS: 4005
CHEMOTHERAPY RECORDS: NORMAL
CHEMOTHERAPY RX CANCER: NORMAL
DATE 1ST CHEMO CANCER: NORMAL
RAD ONC ARIA COURSE LAST TREATMENT DATE: NORMAL
RAD ONC ARIA COURSE TREATMENT ELAPSED DAYS: NORMAL
RAD ONC ARIA REFERENCE POINT DOSAGE GIVEN TO DATE: 10.68
RAD ONC ARIA REFERENCE POINT DOSAGE GIVEN TO DATE: 10.68
RAD ONC ARIA REFERENCE POINT ID: NORMAL
RAD ONC ARIA REFERENCE POINT ID: NORMAL
RAD ONC ARIA REFERENCE POINT SESSION DOSAGE GIVEN: 2.67
RAD ONC ARIA REFERENCE POINT SESSION DOSAGE GIVEN: 2.67

## 2023-06-05 PROCEDURE — 77412 RADIATION TX DELIVERY LVL 3: CPT | Performed by: RADIOLOGY

## 2023-06-06 ENCOUNTER — HOSPITAL ENCOUNTER (OUTPATIENT)
Dept: RADIATION ONCOLOGY | Facility: MEDICAL CENTER | Age: 58
End: 2023-06-06

## 2023-06-06 LAB
CHEMOTHERAPY INFUSION START DATE: NORMAL
CHEMOTHERAPY RECORDS: 2.67
CHEMOTHERAPY RECORDS: 4005
CHEMOTHERAPY RECORDS: NORMAL
CHEMOTHERAPY RX CANCER: NORMAL
DATE 1ST CHEMO CANCER: NORMAL
RAD ONC ARIA COURSE LAST TREATMENT DATE: NORMAL
RAD ONC ARIA COURSE TREATMENT ELAPSED DAYS: NORMAL
RAD ONC ARIA REFERENCE POINT DOSAGE GIVEN TO DATE: 13.35
RAD ONC ARIA REFERENCE POINT DOSAGE GIVEN TO DATE: 13.35
RAD ONC ARIA REFERENCE POINT ID: NORMAL
RAD ONC ARIA REFERENCE POINT ID: NORMAL
RAD ONC ARIA REFERENCE POINT SESSION DOSAGE GIVEN: 2.67
RAD ONC ARIA REFERENCE POINT SESSION DOSAGE GIVEN: 2.67

## 2023-06-06 PROCEDURE — 77412 RADIATION TX DELIVERY LVL 3: CPT | Performed by: RADIOLOGY

## 2023-06-06 PROCEDURE — 77427 RADIATION TX MANAGEMENT X5: CPT | Performed by: RADIOLOGY

## 2023-06-07 ENCOUNTER — HOSPITAL ENCOUNTER (OUTPATIENT)
Dept: RADIATION ONCOLOGY | Facility: MEDICAL CENTER | Age: 58
End: 2023-06-07
Payer: COMMERCIAL

## 2023-06-07 VITALS
SYSTOLIC BLOOD PRESSURE: 123 MMHG | WEIGHT: 119.71 LBS | DIASTOLIC BLOOD PRESSURE: 79 MMHG | HEART RATE: 75 BPM | BODY MASS INDEX: 21.9 KG/M2 | OXYGEN SATURATION: 95 %

## 2023-06-07 LAB
CHEMOTHERAPY INFUSION START DATE: NORMAL
CHEMOTHERAPY RECORDS: 2.67
CHEMOTHERAPY RECORDS: 4005
CHEMOTHERAPY RECORDS: NORMAL
CHEMOTHERAPY RX CANCER: NORMAL
DATE 1ST CHEMO CANCER: NORMAL
RAD ONC ARIA COURSE LAST TREATMENT DATE: NORMAL
RAD ONC ARIA COURSE TREATMENT ELAPSED DAYS: NORMAL
RAD ONC ARIA REFERENCE POINT DOSAGE GIVEN TO DATE: 16.02
RAD ONC ARIA REFERENCE POINT DOSAGE GIVEN TO DATE: 16.02
RAD ONC ARIA REFERENCE POINT ID: NORMAL
RAD ONC ARIA REFERENCE POINT ID: NORMAL
RAD ONC ARIA REFERENCE POINT SESSION DOSAGE GIVEN: 2.67
RAD ONC ARIA REFERENCE POINT SESSION DOSAGE GIVEN: 2.67

## 2023-06-07 PROCEDURE — 77412 RADIATION TX DELIVERY LVL 3: CPT | Performed by: RADIOLOGY

## 2023-06-07 PROCEDURE — 77417 THER RADIOLOGY PORT IMAGE(S): CPT | Performed by: RADIOLOGY

## 2023-06-07 ASSESSMENT — FIBROSIS 4 INDEX: FIB4 SCORE: 1.34

## 2023-06-07 ASSESSMENT — PAIN SCALES - GENERAL: PAINLEVEL: 2=MINIMAL-SLIGHT

## 2023-06-07 NOTE — ON TREATMENT VISIT
ON TREATMENT NOTE  RADIATION ONCOLOGY DEPARTMENT    Patient name:  Alexus Saldaña    Primary Physician:  Marcela Montana M.D. MRN: [id number]  CSN: [id number]   Referring physician:  Mikayla Galaviz M.D. : 1965, 58 y.o.     ENCOUNTER DATE:  23    DIAGNOSIS:    Breast cancer of upper-outer quadrant of left female breast (HCC)  Staging form: Breast, AJCC 8th Edition  - Pathologic: Stage IB (pT1a, pN0(sn), cM0, G2, ER-, [place]-, HER2-) - Signed by Merced Holland M.D. on 2022  Stage prefix: Initial diagnosis  Method of lymph node assessment: [place] lymph node biopsy  Histologic grading system: 3 grade system      TREATMENT SUMMARY:  Radiation Treatments       Active   Plans   L Breast   Most recent treatment: Dose planned: 267 cGy (fraction 6 of 15 on 2023)   Total: Dose planned: 4,005 cGy   Elapsed Days: 7 @ [id number]      Reference Points   L Breast   Most recent treatment: Dose given: 267 cGy (on 2023)   Total: Dose given: 1,602 cGy   Elapsed Days: 7 @ [id number]      L Breast CP   Most recent treatment: Dose given: 267 cGy (on 2023)   Total: Dose given: 1,602 cGy   Elapsed Days: 7 @ [id number]                      SUBJECTIVE:    fatigued      VITAL SIGNS:  /79 (BP Location: Right arm, Patient Position: Sitting, BP Cuff Size: Adult)   Pulse 75   Wt 54.3 kg (119 lb 11.4 oz)   SpO2 95%    Encounter Vitals  Blood Pressure: 123/79  Pulse: 75  Pulse Oximetry: 95 %  Weight: 54.3 kg (119 lb 11.4 oz)  Pain Score: 2=Minimal-Slight      2023     2:55 PM 1/3/2023    12:40 PM   Pain Assessment   Pain Score 2=MINIMAL PA 2=MINIMAL PA   Pain Loc BREAST BREAST          PHYSICAL EXAM:    No erythema        2023     2:56 PM   Toxicity Assessment   Toxicity Assessment Breast   Fatigue (lethargy, malaise, asthenia) Moderate (e.g., decrease in performance status by 1 ECOG level or 20% Karnofsky) or causing difficulty performing some activities   Fever (in the absence of  neutropenia) None   Radiation Dermatitis None   Lymphatics Normal   RT - Pain due to RT None   Dyspnea Normal         IMPRESSION:  Cancer Staging   Breast cancer of upper-outer quadrant of left female breast (HCC)  Staging form: Breast, AJCC 8th Edition  - Pathologic: Stage IB (pT1a, pN0(sn), cM0, G2, ER-, [place]-, HER2-) - Signed by Merced Holland M.D. on 12/27/2022      PLAN:  No change in treatment plan    Disposition:  Treatment plan reviewed. Questions answered. Continue therapy outlined.     Merced Holland M.D.    No orders of the defined types were placed in this encounter.

## 2023-06-08 ENCOUNTER — HOSPITAL ENCOUNTER (OUTPATIENT)
Dept: RADIATION ONCOLOGY | Facility: MEDICAL CENTER | Age: 58
End: 2023-06-08
Payer: COMMERCIAL

## 2023-06-08 LAB
CHEMOTHERAPY INFUSION START DATE: NORMAL
CHEMOTHERAPY RECORDS: 2.67
CHEMOTHERAPY RECORDS: 4005
CHEMOTHERAPY RECORDS: NORMAL
CHEMOTHERAPY RX CANCER: NORMAL
DATE 1ST CHEMO CANCER: NORMAL
RAD ONC ARIA COURSE LAST TREATMENT DATE: NORMAL
RAD ONC ARIA COURSE TREATMENT ELAPSED DAYS: NORMAL
RAD ONC ARIA REFERENCE POINT DOSAGE GIVEN TO DATE: 18.69
RAD ONC ARIA REFERENCE POINT DOSAGE GIVEN TO DATE: 18.69
RAD ONC ARIA REFERENCE POINT ID: NORMAL
RAD ONC ARIA REFERENCE POINT ID: NORMAL
RAD ONC ARIA REFERENCE POINT SESSION DOSAGE GIVEN: 2.67
RAD ONC ARIA REFERENCE POINT SESSION DOSAGE GIVEN: 2.67

## 2023-06-08 PROCEDURE — 77412 RADIATION TX DELIVERY LVL 3: CPT | Performed by: RADIOLOGY

## 2023-06-09 ENCOUNTER — HOSPITAL ENCOUNTER (OUTPATIENT)
Dept: RADIATION ONCOLOGY | Facility: MEDICAL CENTER | Age: 58
End: 2023-06-09
Payer: COMMERCIAL

## 2023-06-09 LAB
CHEMOTHERAPY INFUSION START DATE: NORMAL
CHEMOTHERAPY RECORDS: 2.67
CHEMOTHERAPY RECORDS: 4005
CHEMOTHERAPY RECORDS: NORMAL
CHEMOTHERAPY RX CANCER: NORMAL
DATE 1ST CHEMO CANCER: NORMAL
RAD ONC ARIA COURSE LAST TREATMENT DATE: NORMAL
RAD ONC ARIA COURSE TREATMENT ELAPSED DAYS: NORMAL
RAD ONC ARIA REFERENCE POINT DOSAGE GIVEN TO DATE: 21.36
RAD ONC ARIA REFERENCE POINT DOSAGE GIVEN TO DATE: 21.36
RAD ONC ARIA REFERENCE POINT ID: NORMAL
RAD ONC ARIA REFERENCE POINT ID: NORMAL
RAD ONC ARIA REFERENCE POINT SESSION DOSAGE GIVEN: 2.67
RAD ONC ARIA REFERENCE POINT SESSION DOSAGE GIVEN: 2.67

## 2023-06-09 PROCEDURE — 77336 RADIATION PHYSICS CONSULT: CPT | Performed by: RADIOLOGY

## 2023-06-09 PROCEDURE — 77412 RADIATION TX DELIVERY LVL 3: CPT | Performed by: RADIOLOGY

## 2023-06-12 ENCOUNTER — HOSPITAL ENCOUNTER (OUTPATIENT)
Dept: RADIATION ONCOLOGY | Facility: MEDICAL CENTER | Age: 58
End: 2023-06-12
Payer: COMMERCIAL

## 2023-06-12 LAB
CHEMOTHERAPY INFUSION START DATE: NORMAL
CHEMOTHERAPY RECORDS: 2.67
CHEMOTHERAPY RECORDS: 4005
CHEMOTHERAPY RECORDS: NORMAL
CHEMOTHERAPY RX CANCER: NORMAL
DATE 1ST CHEMO CANCER: NORMAL
RAD ONC ARIA COURSE LAST TREATMENT DATE: NORMAL
RAD ONC ARIA COURSE TREATMENT ELAPSED DAYS: NORMAL
RAD ONC ARIA REFERENCE POINT DOSAGE GIVEN TO DATE: 24.03
RAD ONC ARIA REFERENCE POINT DOSAGE GIVEN TO DATE: 24.03
RAD ONC ARIA REFERENCE POINT ID: NORMAL
RAD ONC ARIA REFERENCE POINT ID: NORMAL
RAD ONC ARIA REFERENCE POINT SESSION DOSAGE GIVEN: 2.67
RAD ONC ARIA REFERENCE POINT SESSION DOSAGE GIVEN: 2.67

## 2023-06-12 PROCEDURE — 77412 RADIATION TX DELIVERY LVL 3: CPT | Performed by: RADIOLOGY

## 2023-06-14 ENCOUNTER — HOSPITAL ENCOUNTER (OUTPATIENT)
Dept: RADIATION ONCOLOGY | Facility: MEDICAL CENTER | Age: 58
End: 2023-06-14
Payer: COMMERCIAL

## 2023-06-14 LAB
CHEMOTHERAPY INFUSION START DATE: NORMAL
CHEMOTHERAPY RECORDS: 2.67
CHEMOTHERAPY RECORDS: 4005
CHEMOTHERAPY RECORDS: NORMAL
CHEMOTHERAPY RX CANCER: NORMAL
DATE 1ST CHEMO CANCER: NORMAL
RAD ONC ARIA COURSE LAST TREATMENT DATE: NORMAL
RAD ONC ARIA COURSE TREATMENT ELAPSED DAYS: NORMAL
RAD ONC ARIA REFERENCE POINT DOSAGE GIVEN TO DATE: 26.7
RAD ONC ARIA REFERENCE POINT DOSAGE GIVEN TO DATE: 26.7
RAD ONC ARIA REFERENCE POINT ID: NORMAL
RAD ONC ARIA REFERENCE POINT ID: NORMAL
RAD ONC ARIA REFERENCE POINT SESSION DOSAGE GIVEN: 2.67
RAD ONC ARIA REFERENCE POINT SESSION DOSAGE GIVEN: 2.67

## 2023-06-14 PROCEDURE — 77412 RADIATION TX DELIVERY LVL 3: CPT | Performed by: RADIOLOGY

## 2023-06-14 PROCEDURE — 77427 RADIATION TX MANAGEMENT X5: CPT | Performed by: RADIOLOGY

## 2023-06-15 ENCOUNTER — HOSPITAL ENCOUNTER (OUTPATIENT)
Dept: RADIATION ONCOLOGY | Facility: MEDICAL CENTER | Age: 58
End: 2023-06-15
Payer: COMMERCIAL

## 2023-06-15 LAB
CHEMOTHERAPY INFUSION START DATE: NORMAL
CHEMOTHERAPY RECORDS: 2.67
CHEMOTHERAPY RECORDS: 4005
CHEMOTHERAPY RECORDS: NORMAL
CHEMOTHERAPY RX CANCER: NORMAL
DATE 1ST CHEMO CANCER: NORMAL
RAD ONC ARIA COURSE LAST TREATMENT DATE: NORMAL
RAD ONC ARIA COURSE TREATMENT ELAPSED DAYS: NORMAL
RAD ONC ARIA REFERENCE POINT DOSAGE GIVEN TO DATE: 29.37
RAD ONC ARIA REFERENCE POINT DOSAGE GIVEN TO DATE: 29.37
RAD ONC ARIA REFERENCE POINT ID: NORMAL
RAD ONC ARIA REFERENCE POINT ID: NORMAL
RAD ONC ARIA REFERENCE POINT SESSION DOSAGE GIVEN: 2.67
RAD ONC ARIA REFERENCE POINT SESSION DOSAGE GIVEN: 2.67

## 2023-06-15 PROCEDURE — 77334 RADIATION TREATMENT AID(S): CPT | Mod: 26 | Performed by: RADIOLOGY

## 2023-06-15 PROCEDURE — 77412 RADIATION TX DELIVERY LVL 3: CPT | Performed by: RADIOLOGY

## 2023-06-15 PROCEDURE — 77307 TELETHX ISODOSE PLAN CPLX: CPT | Mod: 26 | Performed by: RADIOLOGY

## 2023-06-15 PROCEDURE — 77334 RADIATION TREATMENT AID(S): CPT | Performed by: RADIOLOGY

## 2023-06-15 PROCEDURE — 77417 THER RADIOLOGY PORT IMAGE(S): CPT | Performed by: RADIOLOGY

## 2023-06-15 PROCEDURE — 77307 TELETHX ISODOSE PLAN CPLX: CPT | Performed by: RADIOLOGY

## 2023-06-16 ENCOUNTER — HOSPITAL ENCOUNTER (OUTPATIENT)
Dept: RADIATION ONCOLOGY | Facility: MEDICAL CENTER | Age: 58
End: 2023-06-16
Payer: COMMERCIAL

## 2023-06-16 LAB
CHEMOTHERAPY INFUSION START DATE: NORMAL
CHEMOTHERAPY RECORDS: 2.67
CHEMOTHERAPY RECORDS: 4005
CHEMOTHERAPY RECORDS: NORMAL
CHEMOTHERAPY RX CANCER: NORMAL
DATE 1ST CHEMO CANCER: NORMAL
RAD ONC ARIA COURSE LAST TREATMENT DATE: NORMAL
RAD ONC ARIA COURSE TREATMENT ELAPSED DAYS: NORMAL
RAD ONC ARIA REFERENCE POINT DOSAGE GIVEN TO DATE: 32.04
RAD ONC ARIA REFERENCE POINT DOSAGE GIVEN TO DATE: 32.04
RAD ONC ARIA REFERENCE POINT ID: NORMAL
RAD ONC ARIA REFERENCE POINT ID: NORMAL
RAD ONC ARIA REFERENCE POINT SESSION DOSAGE GIVEN: 2.67
RAD ONC ARIA REFERENCE POINT SESSION DOSAGE GIVEN: 2.67

## 2023-06-16 PROCEDURE — 77412 RADIATION TX DELIVERY LVL 3: CPT | Performed by: RADIOLOGY

## 2023-06-19 ENCOUNTER — HOSPITAL ENCOUNTER (OUTPATIENT)
Dept: RADIATION ONCOLOGY | Facility: MEDICAL CENTER | Age: 58
End: 2023-06-19
Payer: COMMERCIAL

## 2023-06-19 LAB
CHEMOTHERAPY INFUSION START DATE: NORMAL
CHEMOTHERAPY RECORDS: 2.67
CHEMOTHERAPY RECORDS: 4005
CHEMOTHERAPY RECORDS: NORMAL
CHEMOTHERAPY RX CANCER: NORMAL
DATE 1ST CHEMO CANCER: NORMAL
RAD ONC ARIA COURSE LAST TREATMENT DATE: NORMAL
RAD ONC ARIA COURSE TREATMENT ELAPSED DAYS: NORMAL
RAD ONC ARIA REFERENCE POINT DOSAGE GIVEN TO DATE: 34.71
RAD ONC ARIA REFERENCE POINT DOSAGE GIVEN TO DATE: 34.71
RAD ONC ARIA REFERENCE POINT ID: NORMAL
RAD ONC ARIA REFERENCE POINT ID: NORMAL
RAD ONC ARIA REFERENCE POINT SESSION DOSAGE GIVEN: 2.67
RAD ONC ARIA REFERENCE POINT SESSION DOSAGE GIVEN: 2.67

## 2023-06-19 PROCEDURE — 77412 RADIATION TX DELIVERY LVL 3: CPT | Performed by: RADIOLOGY

## 2023-06-19 PROCEDURE — 77336 RADIATION PHYSICS CONSULT: CPT | Performed by: RADIOLOGY

## 2023-06-20 ENCOUNTER — HOSPITAL ENCOUNTER (OUTPATIENT)
Dept: RADIATION ONCOLOGY | Facility: MEDICAL CENTER | Age: 58
End: 2023-06-20
Payer: COMMERCIAL

## 2023-06-20 LAB
CHEMOTHERAPY INFUSION START DATE: NORMAL
CHEMOTHERAPY RECORDS: 2.67
CHEMOTHERAPY RECORDS: 4005
CHEMOTHERAPY RECORDS: NORMAL
CHEMOTHERAPY RX CANCER: NORMAL
DATE 1ST CHEMO CANCER: NORMAL
RAD ONC ARIA COURSE LAST TREATMENT DATE: NORMAL
RAD ONC ARIA COURSE TREATMENT ELAPSED DAYS: NORMAL
RAD ONC ARIA REFERENCE POINT DOSAGE GIVEN TO DATE: 37.38
RAD ONC ARIA REFERENCE POINT DOSAGE GIVEN TO DATE: 37.38
RAD ONC ARIA REFERENCE POINT ID: NORMAL
RAD ONC ARIA REFERENCE POINT ID: NORMAL
RAD ONC ARIA REFERENCE POINT SESSION DOSAGE GIVEN: 2.67
RAD ONC ARIA REFERENCE POINT SESSION DOSAGE GIVEN: 2.67

## 2023-06-20 PROCEDURE — 77412 RADIATION TX DELIVERY LVL 3: CPT | Performed by: RADIOLOGY

## 2023-06-21 ENCOUNTER — HOSPITAL ENCOUNTER (OUTPATIENT)
Dept: RADIATION ONCOLOGY | Facility: MEDICAL CENTER | Age: 58
End: 2023-06-21
Payer: COMMERCIAL

## 2023-06-21 VITALS
SYSTOLIC BLOOD PRESSURE: 126 MMHG | DIASTOLIC BLOOD PRESSURE: 78 MMHG | WEIGHT: 117.95 LBS | HEART RATE: 86 BPM | OXYGEN SATURATION: 97 % | BODY MASS INDEX: 21.57 KG/M2

## 2023-06-21 LAB
CHEMOTHERAPY INFUSION START DATE: NORMAL
CHEMOTHERAPY RECORDS: 2.67
CHEMOTHERAPY RECORDS: 4005
CHEMOTHERAPY RECORDS: NORMAL
CHEMOTHERAPY RX CANCER: NORMAL
DATE 1ST CHEMO CANCER: NORMAL
RAD ONC ARIA COURSE LAST TREATMENT DATE: NORMAL
RAD ONC ARIA COURSE TREATMENT ELAPSED DAYS: NORMAL
RAD ONC ARIA REFERENCE POINT DOSAGE GIVEN TO DATE: 40.05
RAD ONC ARIA REFERENCE POINT DOSAGE GIVEN TO DATE: 40.05
RAD ONC ARIA REFERENCE POINT ID: NORMAL
RAD ONC ARIA REFERENCE POINT ID: NORMAL
RAD ONC ARIA REFERENCE POINT SESSION DOSAGE GIVEN: 2.67
RAD ONC ARIA REFERENCE POINT SESSION DOSAGE GIVEN: 2.67

## 2023-06-21 PROCEDURE — 77412 RADIATION TX DELIVERY LVL 3: CPT | Performed by: RADIOLOGY

## 2023-06-21 ASSESSMENT — FIBROSIS 4 INDEX: FIB4 SCORE: 1.34

## 2023-06-21 ASSESSMENT — PAIN SCALES - GENERAL: PAINLEVEL: NO PAIN

## 2023-06-22 ENCOUNTER — HOSPITAL ENCOUNTER (OUTPATIENT)
Dept: RADIATION ONCOLOGY | Facility: MEDICAL CENTER | Age: 58
End: 2023-06-22

## 2023-06-22 LAB
CHEMOTHERAPY INFUSION START DATE: NORMAL
CHEMOTHERAPY RECORDS: 1400
CHEMOTHERAPY RECORDS: 2
CHEMOTHERAPY RECORDS: NORMAL
CHEMOTHERAPY RX CANCER: NORMAL
DATE 1ST CHEMO CANCER: NORMAL
RAD ONC ARIA COURSE LAST TREATMENT DATE: NORMAL
RAD ONC ARIA COURSE TREATMENT ELAPSED DAYS: NORMAL
RAD ONC ARIA REFERENCE POINT DOSAGE GIVEN TO DATE: 2
RAD ONC ARIA REFERENCE POINT DOSAGE GIVEN TO DATE: 2
RAD ONC ARIA REFERENCE POINT ID: NORMAL
RAD ONC ARIA REFERENCE POINT ID: NORMAL
RAD ONC ARIA REFERENCE POINT SESSION DOSAGE GIVEN: 2
RAD ONC ARIA REFERENCE POINT SESSION DOSAGE GIVEN: 2

## 2023-06-22 PROCEDURE — 77402 RADIATION TX DELIVERY LVL 1: CPT | Performed by: RADIOLOGY

## 2023-06-22 PROCEDURE — 77280 THER RAD SIMULAJ FIELD SMPL: CPT | Mod: 26 | Performed by: RADIOLOGY

## 2023-06-22 PROCEDURE — 77280 THER RAD SIMULAJ FIELD SMPL: CPT | Performed by: RADIOLOGY

## 2023-06-22 PROCEDURE — 77412 RADIATION TX DELIVERY LVL 3: CPT | Performed by: RADIOLOGY

## 2023-06-22 NOTE — CT SIMULATION
DATE OF SERVICE: 6/22/2023    Radiation Therapy Episodes       Active Episodes       Radiation Therapy: 3D CRT    Radiation Therapy: 3D CRT                   Radiation Treatments         Plan Last Treated On Elapsed Days Fractions Treated Prescribed Fraction Dose (cGy) Prescribed Total Dose (cGy)    L Breast 6/21/2023 21 @ [id number] 15 of 15 267 4,005    L Brst [facility] 6/22/2023 22 @ [id number] 1 of 7 200 1,400                  Reference Point Last Treated On Elapsed Days Most Recent Session Dose (cGy) Total Dose (cGy)    L Breast 6/21/2023 21 @ [id number] 267 4,005    L Breast CP 6/21/2023 21 @ [id number] 267 4,005    L Brst [facility] 6/22/2023 22 @ [id number] 200 200    L Brst [facility] 6/22/2023 22 @ [id number] 200 200                            First Visit Simple Simulation: Called by [employer] machine to verify treatment parameters including:  treatment site, treatment dose, and treatment setup prior to first treatment. Image derived shifts reviewed in all appropriate planes.  Shifts approved.  Patient treated.    I have personally reviewed the relevant data, performed the target localization, and determined all relevant factors for this patient’s simulation.

## 2023-06-23 ENCOUNTER — HOSPITAL ENCOUNTER (OUTPATIENT)
Dept: RADIATION ONCOLOGY | Facility: MEDICAL CENTER | Age: 58
End: 2023-06-23
Payer: COMMERCIAL

## 2023-06-23 LAB
CHEMOTHERAPY INFUSION START DATE: NORMAL
CHEMOTHERAPY RECORDS: 1400
CHEMOTHERAPY RECORDS: 2
CHEMOTHERAPY RECORDS: NORMAL
CHEMOTHERAPY RX CANCER: NORMAL
DATE 1ST CHEMO CANCER: NORMAL
RAD ONC ARIA COURSE LAST TREATMENT DATE: NORMAL
RAD ONC ARIA COURSE TREATMENT ELAPSED DAYS: NORMAL
RAD ONC ARIA REFERENCE POINT DOSAGE GIVEN TO DATE: 4
RAD ONC ARIA REFERENCE POINT DOSAGE GIVEN TO DATE: 4
RAD ONC ARIA REFERENCE POINT ID: NORMAL
RAD ONC ARIA REFERENCE POINT ID: NORMAL
RAD ONC ARIA REFERENCE POINT SESSION DOSAGE GIVEN: 2
RAD ONC ARIA REFERENCE POINT SESSION DOSAGE GIVEN: 2

## 2023-06-23 PROCEDURE — 77014 PR CT GUIDANCE PLACEMENT RAD THERAPY FIELDS: CPT | Mod: 26 | Performed by: RADIOLOGY

## 2023-06-23 PROCEDURE — 77387 GUIDANCE FOR RADJ TX DLVR: CPT | Performed by: RADIOLOGY

## 2023-06-23 PROCEDURE — 77412 RADIATION TX DELIVERY LVL 3: CPT | Performed by: RADIOLOGY

## 2023-06-26 ENCOUNTER — HOSPITAL ENCOUNTER (OUTPATIENT)
Dept: RADIATION ONCOLOGY | Facility: MEDICAL CENTER | Age: 58
End: 2023-06-26
Payer: COMMERCIAL

## 2023-06-26 LAB
CHEMOTHERAPY INFUSION START DATE: NORMAL
CHEMOTHERAPY RECORDS: 1400
CHEMOTHERAPY RECORDS: 2
CHEMOTHERAPY RECORDS: NORMAL
CHEMOTHERAPY RX CANCER: NORMAL
DATE 1ST CHEMO CANCER: NORMAL
RAD ONC ARIA COURSE LAST TREATMENT DATE: NORMAL
RAD ONC ARIA COURSE TREATMENT ELAPSED DAYS: NORMAL
RAD ONC ARIA REFERENCE POINT DOSAGE GIVEN TO DATE: 6
RAD ONC ARIA REFERENCE POINT DOSAGE GIVEN TO DATE: 6
RAD ONC ARIA REFERENCE POINT ID: NORMAL
RAD ONC ARIA REFERENCE POINT ID: NORMAL
RAD ONC ARIA REFERENCE POINT SESSION DOSAGE GIVEN: 2
RAD ONC ARIA REFERENCE POINT SESSION DOSAGE GIVEN: 2

## 2023-06-26 PROCEDURE — 77014 PR CT GUIDANCE PLACEMENT RAD THERAPY FIELDS: CPT | Mod: 26 | Performed by: RADIOLOGY

## 2023-06-26 PROCEDURE — 77412 RADIATION TX DELIVERY LVL 3: CPT | Performed by: RADIOLOGY

## 2023-06-26 PROCEDURE — 77336 RADIATION PHYSICS CONSULT: CPT | Performed by: RADIOLOGY

## 2023-06-26 PROCEDURE — 77387 GUIDANCE FOR RADJ TX DLVR: CPT | Performed by: RADIOLOGY

## 2023-06-26 NOTE — ON TREATMENT VISIT
ON TREATMENT NOTE  RADIATION ONCOLOGY DEPARTMENT    Patient name:  Alexus Saldaña    Primary Physician:  Marcela Montana M.D. MRN: [id number]  CSN: [id number]   Referring physician:  Mikayla Galaviz M.D. : 1965, 58 y.o.     ENCOUNTER DATE:  23    DIAGNOSIS:    Breast cancer of upper-outer quadrant of left female breast (HCC)  Staging form: Breast, AJCC 8th Edition  - Pathologic: Stage IB (pT1a, pN0(sn), cM0, G2, ER-, [place]-, HER2-) - Signed by Merced Holland M.D. on 2022  Stage prefix: Initial diagnosis  Method of lymph node assessment: [place] lymph node biopsy  Histologic grading system: 3 grade system      TREATMENT SUMMARY:  Radiation Treatments       Active   Plans   L Breast   Most recent treatment: Dose planned: 267 cGy (fraction 15 of 15 on 2023)   Total: Dose planned: 4,005 cGy   Elapsed Days:  @ [id number]      L Brst Bst   Most recent treatment: Dose planned: 200 cGy (fraction 2 of 7 on 2023)   Total: Dose planned: 1,400 cGy   Elapsed Days:  @ [id number]      Reference Points   L Breast   Most recent treatment: Dose given: 267 cGy (on 2023)   Total: Dose given: 4,005 cGy   Elapsed Days:  @ [id number]      L Breast CP   Most recent treatment: Dose given: 267 cGy (on 2023)   Total: Dose given: 4,005 cGy   Elapsed Days:  @ [id number]      L Brst Bst   Most recent treatment: Dose given: 200 cGy (on 2023)   Total: Dose given: 400 cGy   Elapsed Days:  @ [id number]      L Brst Bst CP   Most recent treatment: Dose given: 200 cGy (on 2023)   Total: Dose given: 400 cGy   Elapsed Days:  @ [id number]                      SUBJECTIVE:   Tolerating therapy without event      VITAL SIGNS:  /78 (BP Location: Right arm, Patient Position: Sitting, BP Cuff Size: Adult)   Pulse 86   Wt 53.5 kg (117 lb 15.1 oz)   SpO2 97%    Encounter Vitals  Blood Pressure: 126/78  Pulse: 86  Pulse Oximetry: 97 %  Weight: 53.5 kg (117 lb 15.1  oz)  Pain Score: No pain      6/21/2023     3:03 PM 6/7/2023     2:55 PM 1/3/2023    12:40 PM   Pain Assessment   Pain Score NO PAIN 2=MINIMAL PA 2=MINIMAL PA   Pain Loc  BREAST BREAST          PHYSICAL EXAM:    Mild erythema in the treatment field        6/21/2023     3:04 PM 6/7/2023     2:56 PM   Toxicity Assessment   Toxicity Assessment Breast Breast   Fatigue (lethargy, malaise, asthenia) Increased fatigue over baseline, but not altering normal activities Moderate (e.g., decrease in performance status by 1 ECOG level or 20% Karnofsky) or causing difficulty performing some activities   Fever (in the absence of neutropenia) None None   Radiation Dermatitis Faint erythema or dry desquamation None   Lymphatics Normal Normal   RT - Pain due to RT Mild pain not interfering with function None   Dyspnea Normal Normal         IMPRESSION:  Cancer Staging   Breast cancer of upper-outer quadrant of left female breast (HCC)  Staging form: Breast, AJCC 8th Edition  - Pathologic: Stage IB (pT1a, pN0(sn), cM0, G2, ER-, [place]-, HER2-) - Signed by Merced Holland M.D. on 12/27/2022      PLAN:  No change in treatment plan    Disposition:  Treatment plan reviewed. Questions answered. Continue therapy outlined.     Merced Holland M.D.    No orders of the defined types were placed in this encounter.

## 2023-06-27 ENCOUNTER — HOSPITAL ENCOUNTER (OUTPATIENT)
Dept: RADIATION ONCOLOGY | Facility: MEDICAL CENTER | Age: 58
End: 2023-06-27
Payer: COMMERCIAL

## 2023-06-27 LAB
CHEMOTHERAPY INFUSION START DATE: NORMAL
CHEMOTHERAPY RECORDS: 1400
CHEMOTHERAPY RECORDS: 2
CHEMOTHERAPY RECORDS: NORMAL
CHEMOTHERAPY RX CANCER: NORMAL
DATE 1ST CHEMO CANCER: NORMAL
RAD ONC ARIA COURSE LAST TREATMENT DATE: NORMAL
RAD ONC ARIA COURSE TREATMENT ELAPSED DAYS: NORMAL
RAD ONC ARIA REFERENCE POINT DOSAGE GIVEN TO DATE: 8
RAD ONC ARIA REFERENCE POINT DOSAGE GIVEN TO DATE: 8
RAD ONC ARIA REFERENCE POINT ID: NORMAL
RAD ONC ARIA REFERENCE POINT ID: NORMAL
RAD ONC ARIA REFERENCE POINT SESSION DOSAGE GIVEN: 2
RAD ONC ARIA REFERENCE POINT SESSION DOSAGE GIVEN: 2

## 2023-06-27 PROCEDURE — 77014 PR CT GUIDANCE PLACEMENT RAD THERAPY FIELDS: CPT | Mod: 26 | Performed by: RADIOLOGY

## 2023-06-27 PROCEDURE — 77412 RADIATION TX DELIVERY LVL 3: CPT | Performed by: RADIOLOGY

## 2023-06-27 PROCEDURE — 77387 GUIDANCE FOR RADJ TX DLVR: CPT | Performed by: RADIOLOGY

## 2023-06-27 PROCEDURE — 77427 RADIATION TX MANAGEMENT X5: CPT | Performed by: RADIOLOGY

## 2023-06-28 ENCOUNTER — HOSPITAL ENCOUNTER (OUTPATIENT)
Dept: RADIATION ONCOLOGY | Facility: MEDICAL CENTER | Age: 58
End: 2023-06-28
Payer: COMMERCIAL

## 2023-06-28 VITALS
WEIGHT: 116.62 LBS | HEART RATE: 93 BPM | OXYGEN SATURATION: 96 % | BODY MASS INDEX: 21.33 KG/M2 | DIASTOLIC BLOOD PRESSURE: 84 MMHG | SYSTOLIC BLOOD PRESSURE: 157 MMHG

## 2023-06-28 LAB
CHEMOTHERAPY INFUSION START DATE: NORMAL
CHEMOTHERAPY RECORDS: 1400
CHEMOTHERAPY RECORDS: 2
CHEMOTHERAPY RECORDS: NORMAL
CHEMOTHERAPY RX CANCER: NORMAL
DATE 1ST CHEMO CANCER: NORMAL
RAD ONC ARIA COURSE LAST TREATMENT DATE: NORMAL
RAD ONC ARIA COURSE TREATMENT ELAPSED DAYS: NORMAL
RAD ONC ARIA REFERENCE POINT DOSAGE GIVEN TO DATE: 10
RAD ONC ARIA REFERENCE POINT DOSAGE GIVEN TO DATE: 10
RAD ONC ARIA REFERENCE POINT ID: NORMAL
RAD ONC ARIA REFERENCE POINT ID: NORMAL
RAD ONC ARIA REFERENCE POINT SESSION DOSAGE GIVEN: 2
RAD ONC ARIA REFERENCE POINT SESSION DOSAGE GIVEN: 2

## 2023-06-28 PROCEDURE — 77412 RADIATION TX DELIVERY LVL 3: CPT | Performed by: RADIOLOGY

## 2023-06-28 PROCEDURE — 77387 GUIDANCE FOR RADJ TX DLVR: CPT | Performed by: RADIOLOGY

## 2023-06-28 PROCEDURE — 77014 PR CT GUIDANCE PLACEMENT RAD THERAPY FIELDS: CPT | Mod: 26 | Performed by: RADIOLOGY

## 2023-06-28 ASSESSMENT — FIBROSIS 4 INDEX: FIB4 SCORE: 1.34

## 2023-06-28 ASSESSMENT — PAIN SCALES - GENERAL: PAINLEVEL: NO PAIN

## 2023-06-28 NOTE — ON TREATMENT VISIT
ON TREATMENT NOTE  RADIATION ONCOLOGY DEPARTMENT    Patient name:  Alexus Saldaña    Primary Physician:  Marcela Montana M.D. MRN: [id number]  CSN: [id number]   Referring physician:  Mikayla Galaviz M.D. : 1965, 58 y.o.     ENCOUNTER DATE:  23    DIAGNOSIS:    Breast cancer of upper-outer quadrant of left female breast (HCC)  Staging form: Breast, AJCC 8th Edition  - Pathologic: Stage IB (pT1a, pN0(sn), cM0, G2, ER-, [place]-, HER2-) - Signed by Merced Holland M.D. on 2022  Stage prefix: Initial diagnosis  Method of lymph node assessment: [place] lymph node biopsy  Histologic grading system: 3 grade system      TREATMENT SUMMARY:  Radiation Treatments       Active   Plans   L Breast   Most recent treatment: Dose planned: 267 cGy (fraction 15 of 15 on 2023)   Total: Dose planned: 4,005 cGy   Elapsed Days:  @ [id number]      L Brst Bst   Most recent treatment: Dose planned: 200 cGy (fraction 5 of 7 on 2023)   Total: Dose planned: 1,400 cGy   Elapsed Days: 28 @ [id number]      Reference Points   L Breast   Most recent treatment: Dose given: 267 cGy (on 2023)   Total: Dose given: 4,005 cGy   Elapsed Days:  @ [id number]      L Breast CP   Most recent treatment: Dose given: 267 cGy (on 2023)   Total: Dose given: 4,005 cGy   Elapsed Days:  @ [id number]      L Brst Bst   Most recent treatment: Dose given: 200 cGy (on 2023)   Total: Dose given: 1,000 cGy   Elapsed Days: 28 @ [id number]      L Brst Bst CP   Most recent treatment: Dose given: 200 cGy (on 2023)   Total: Dose given: 1,000 cGy   Elapsed Days: 28 @                       SUBJECTIVE:    Tolerating therapy without major event.  Has a little bit of pruritus in the field and mild fatigue.      VITAL SIGNS:  BP (!) 157/84 (BP Location: Right arm, Patient Position: Sitting, BP Cuff Size: Adult)   Pulse 93   Wt 52.9 kg (116 lb 10 oz)   SpO2 96%    Encounter Vitals  Blood Pressure: (!)  157/84  Pulse: 93  Pulse Oximetry: 96 %  Weight: 52.9 kg (116 lb 10 oz)  Pain Score: No pain      6/28/2023     2:55 PM 6/21/2023     3:03 PM 6/7/2023     2:55 PM 1/3/2023    12:40 PM   Pain Assessment   Pain Score NO PAIN NO PAIN 2=MINIMAL PA 2=MINIMAL PA   Pain Loc   BREAST BREAST          PHYSICAL EXAM:    Mild erythema in the treatment field        6/28/2023     2:58 PM 6/21/2023     3:04 PM 6/7/2023     2:56 PM   Toxicity Assessment   Toxicity Assessment Breast Breast Breast   Fatigue (lethargy, malaise, asthenia) Increased fatigue over baseline, but not altering normal activities Increased fatigue over baseline, but not altering normal activities Moderate (e.g., decrease in performance status by 1 ECOG level or 20% Karnofsky) or causing difficulty performing some activities   Fever (in the absence of neutropenia) None None None   Radiation Dermatitis Faint erythema or dry desquamation Faint erythema or dry desquamation None   Lymphatics Normal Normal Normal   RT - Pain due to RT Mild pain not interfering with function Mild pain not interfering with function None   Dyspnea Normal Normal Normal         IMPRESSION:  Cancer Staging   Breast cancer of upper-outer quadrant of left female breast (HCC)  Staging form: Breast, AJCC 8th Edition  - Pathologic: Stage IB (pT1a, pN0(sn), cM0, G2, ER-, [place]-, HER2-) - Signed by Merced Holland M.D. on 12/27/2022      PLAN:  No change in treatment plan    Disposition:  Treatment plan reviewed. Questions answered. Continue therapy outlined.     Merced Holland M.D.    No orders of the defined types were placed in this encounter.

## 2023-06-29 ENCOUNTER — HOSPITAL ENCOUNTER (OUTPATIENT)
Dept: RADIATION ONCOLOGY | Facility: MEDICAL CENTER | Age: 58
End: 2023-06-29
Payer: COMMERCIAL

## 2023-06-29 LAB
CHEMOTHERAPY INFUSION START DATE: NORMAL
CHEMOTHERAPY RECORDS: 1400
CHEMOTHERAPY RECORDS: 2
CHEMOTHERAPY RECORDS: NORMAL
CHEMOTHERAPY RX CANCER: NORMAL
DATE 1ST CHEMO CANCER: NORMAL
RAD ONC ARIA COURSE LAST TREATMENT DATE: NORMAL
RAD ONC ARIA COURSE TREATMENT ELAPSED DAYS: NORMAL
RAD ONC ARIA REFERENCE POINT DOSAGE GIVEN TO DATE: 12
RAD ONC ARIA REFERENCE POINT DOSAGE GIVEN TO DATE: 12
RAD ONC ARIA REFERENCE POINT ID: NORMAL
RAD ONC ARIA REFERENCE POINT ID: NORMAL
RAD ONC ARIA REFERENCE POINT SESSION DOSAGE GIVEN: 2
RAD ONC ARIA REFERENCE POINT SESSION DOSAGE GIVEN: 2

## 2023-06-29 PROCEDURE — 77412 RADIATION TX DELIVERY LVL 3: CPT | Performed by: RADIOLOGY

## 2023-06-29 PROCEDURE — 77387 GUIDANCE FOR RADJ TX DLVR: CPT | Performed by: RADIOLOGY

## 2023-06-29 PROCEDURE — 77014 PR CT GUIDANCE PLACEMENT RAD THERAPY FIELDS: CPT | Mod: 26 | Performed by: RADIOLOGY

## 2023-06-30 ENCOUNTER — HOSPITAL ENCOUNTER (OUTPATIENT)
Dept: RADIATION ONCOLOGY | Facility: MEDICAL CENTER | Age: 58
End: 2023-06-30
Payer: COMMERCIAL

## 2023-06-30 LAB
CHEMOTHERAPY INFUSION START DATE: NORMAL
CHEMOTHERAPY INFUSION START DATE: NORMAL
CHEMOTHERAPY INFUSION STOP DATE: NORMAL
CHEMOTHERAPY RECORDS: 1400
CHEMOTHERAPY RECORDS: 1400
CHEMOTHERAPY RECORDS: 2
CHEMOTHERAPY RECORDS: 2
CHEMOTHERAPY RECORDS: 2.67
CHEMOTHERAPY RECORDS: 4005
CHEMOTHERAPY RECORDS: NORMAL
CHEMOTHERAPY RX CANCER: NORMAL
CHEMOTHERAPY RX CANCER: NORMAL
DATE 1ST CHEMO CANCER: NORMAL
DATE 1ST CHEMO CANCER: NORMAL
RAD ONC ARIA COURSE LAST TREATMENT DATE: NORMAL
RAD ONC ARIA COURSE LAST TREATMENT DATE: NORMAL
RAD ONC ARIA COURSE TREATMENT ELAPSED DAYS: NORMAL
RAD ONC ARIA COURSE TREATMENT ELAPSED DAYS: NORMAL
RAD ONC ARIA REFERENCE POINT DOSAGE GIVEN TO DATE: 14
RAD ONC ARIA REFERENCE POINT DOSAGE GIVEN TO DATE: 40.05
RAD ONC ARIA REFERENCE POINT DOSAGE GIVEN TO DATE: 40.05
RAD ONC ARIA REFERENCE POINT ID: NORMAL
RAD ONC ARIA REFERENCE POINT SESSION DOSAGE GIVEN: 2
RAD ONC ARIA REFERENCE POINT SESSION DOSAGE GIVEN: 2

## 2023-06-30 PROCEDURE — 77336 RADIATION PHYSICS CONSULT: CPT | Performed by: RADIOLOGY

## 2023-06-30 PROCEDURE — 77387 GUIDANCE FOR RADJ TX DLVR: CPT | Performed by: RADIOLOGY

## 2023-06-30 PROCEDURE — 77014 PR CT GUIDANCE PLACEMENT RAD THERAPY FIELDS: CPT | Mod: 26 | Performed by: RADIOLOGY

## 2023-06-30 PROCEDURE — 77412 RADIATION TX DELIVERY LVL 3: CPT | Performed by: RADIOLOGY

## 2023-07-06 NOTE — RADIATION COMPLETION NOTES
END OF TREATMENT SUMMARY    Patient name:  Alexus Saldaña    Primary Physician:  Marcela Montana M.D. MRN: [id number]  CSN: [id number]   Referring physician:  Dr. Mikayla Galaviz.   : 1965, 58 y.o.       TREATMENT SUMMARY:        Course First Treatment Date 2023    Course Last Treatment Date 2023   Course Elapsed Days 30 @ [id number]   Course Intent Curative     Radiation Therapy Episodes       Active Episodes       Radiation Therapy: 3D CRT    Radiation Therapy: 3D CRT                   Radiation Treatments         Plan Last Treated On Elapsed Days Fractions Treated Prescribed Fraction Dose (cGy) Prescribed Total Dose (cGy)    L Breast 2023 30 @ [id number] 15 of 15 267 4,005    [facility] @ [id number] 7 of 7 200 1,400                  Reference Point Last Treated On Elapsed Days Most Recent Session Dose (cGy) Total Dose (cGy)    L Breast 2023 30 @ [id number] -- 4,005    L Breast CP 2023 30 @ [id number] -- 4,005    [facility] @ [id number] -- 1,400    [facility] 30 @ [id number] -- 1,400                                     STAGE:   Breast cancer of upper-outer quadrant of left female breast (HCC)  Staging form: Breast, AJCC 8th Edition  - Pathologic: Stage IB (pT1a, pN0(sn), cM0, G2, ER-, [place]-, HER2-) - Signed by Merced Holland M.D. on 2022  Stage prefix: Initial diagnosis  Method of lymph node assessment: [place] lymph node biopsy  Histologic grading system: 3 grade system       TREATMENT INDICATION:   Local control, cure     CONCURRENT SYSTEMIC TREATMENT:   sequential     RT COURSE DISCONTINUED EARLY:   No     PATIENT EXPERIENCE:       2023     2:58 PM 2023     3:04 PM 2023     2:56 PM   Toxicity Assessment   Toxicity Assessment Breast Breast Breast   Fatigue (lethargy, malaise, asthenia) Increased fatigue over baseline, but not altering normal activities Increased fatigue over baseline, but not  altering normal activities Moderate (e.g., decrease in performance status by 1 ECOG level or 20% Karnofsky) or causing difficulty performing some activities   Fever (in the absence of neutropenia) None None None   Radiation Dermatitis Faint erythema or dry desquamation Faint erythema or dry desquamation None   Lymphatics Normal Normal Normal   RT - Pain due to RT Mild pain not interfering with function Mild pain not interfering with function None   Dyspnea Normal Normal Normal        FOLLOW-UP PLAN:   6 Weeks     COMMENT:          ANATOMIC TARGET SUMMARY    ANATOMIC TARGET MODALITY TECHNIQUE   L_breast   External beam, photons 3D Conformal            COMMENT:         DIAGRAMS:      DOSE VOLUME HISTOGRAMS:

## 2023-08-11 ASSESSMENT — LIFESTYLE VARIABLES
SMOKING_YEARS: 20
SMOKING_STATUS: YES
TOBACCO_USE: YES

## 2023-08-14 ENCOUNTER — HOSPITAL ENCOUNTER (OUTPATIENT)
Dept: RADIATION ONCOLOGY | Facility: MEDICAL CENTER | Age: 58
End: 2023-08-31
Attending: RADIOLOGY
Payer: COMMERCIAL

## 2023-08-14 NOTE — PROGRESS NOTES
Telephone Appointment Visit   This telephone visit was initiated by the patient and they verbally consented.    Patient was at home during phone call.  Reason for Call:  Symptom Follow-up    HPI:     57 y.o. female with [id number]. 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  Status post reexcision after lumpectomy with a positive margin over 1 mm breath on the lateral margin.  [place] lymph nodes negative.   She is status post chemotherapy and radiation therapy complete  6/30/2023.    Since last seen patient has been doing fairly well.  She did get some soreness under the armpit after radiation that is gotten a lot better.  Her breast was her nipple was inverted but now it is not so she is very very happy about that.  She is going to see Dr. Galaviz and get a mammogram within the next month.  She has a follow-up with Dr. Martin in November.  All in all she did very well with radiation therapy.     Labs / Images Reviewed:   none     Assessment and Plan:       Stage Ib triple negative breast cancer status postlumpectomy and sentinel node dissection followed by chemotherapy and radiation therapy complete 6/30/2023.    Follow-up:   Patient is going to continue her follow-up with Dr. Galaviz and Dr. Martin based on national cancer guidelines.  I am happy to see her on an as-needed basis.    Total Time Spent (mins): 10    Merced Holland M.D.

## 2023-09-11 ENCOUNTER — HOSPITAL ENCOUNTER (OUTPATIENT)
Dept: RADIOLOGY | Facility: MEDICAL CENTER | Age: 58
End: 2023-09-11
Attending: SURGERY
Payer: COMMERCIAL

## 2023-09-11 DIAGNOSIS — Z85.3 PERSONAL HISTORY OF MALIGNANT NEOPLASM OF BREAST: ICD-10-CM

## 2023-09-11 PROCEDURE — 76642 ULTRASOUND BREAST LIMITED: CPT | Mod: RT

## 2023-09-11 PROCEDURE — G0279 TOMOSYNTHESIS, MAMMO: HCPCS

## 2023-11-14 ENCOUNTER — HOSPITAL ENCOUNTER (OUTPATIENT)
Dept: RADIOLOGY | Facility: MEDICAL CENTER | Age: 58
End: 2023-11-14
Attending: INTERNAL MEDICINE
Payer: COMMERCIAL

## 2023-11-14 DIAGNOSIS — C50.412 MALIGNANT NEOPLASM OF UPPER-OUTER QUADRANT OF LEFT FEMALE BREAST, UNSPECIFIED ESTROGEN RECEPTOR STATUS (HCC): ICD-10-CM

## 2023-11-14 DIAGNOSIS — T45.1X5A AGRANULOCYTOSIS SECONDARY TO CANCER CHEMOTHERAPY (HCC): ICD-10-CM

## 2023-11-14 DIAGNOSIS — D70.1 AGRANULOCYTOSIS SECONDARY TO CANCER CHEMOTHERAPY (HCC): ICD-10-CM

## 2023-11-14 PROCEDURE — G0279 TOMOSYNTHESIS, MAMMO: HCPCS

## 2023-11-14 PROCEDURE — 76642 ULTRASOUND BREAST LIMITED: CPT | Mod: LT

## 2024-03-15 ENCOUNTER — HOSPITAL ENCOUNTER (OUTPATIENT)
Dept: RADIOLOGY | Facility: MEDICAL CENTER | Age: 59
End: 2024-03-15
Attending: PHYSICIAN ASSISTANT
Payer: COMMERCIAL

## 2024-03-15 DIAGNOSIS — M25.511 ACUTE PAIN OF RIGHT SHOULDER: ICD-10-CM

## 2024-03-15 DIAGNOSIS — M79.601 RIGHT UPPER LIMB PAIN: ICD-10-CM

## 2024-03-15 DIAGNOSIS — Z85.3 PERSONAL HISTORY OF MALIGNANT NEOPLASM OF BREAST: ICD-10-CM

## 2024-03-15 PROCEDURE — 73223 MRI JOINT UPR EXTR W/O&W/DYE: CPT | Mod: RT

## 2024-03-15 PROCEDURE — 73220 MRI UPPR EXTREMITY W/O&W/DYE: CPT | Mod: RT

## 2024-03-15 PROCEDURE — 700117 HCHG RX CONTRAST REV CODE 255: Performed by: PHYSICIAN ASSISTANT

## 2024-03-15 PROCEDURE — A9579 GAD-BASE MR CONTRAST NOS,1ML: HCPCS | Performed by: PHYSICIAN ASSISTANT

## 2024-03-15 RX ADMIN — GADOTERIDOL 13 ML: 279.3 INJECTION, SOLUTION INTRAVENOUS at 18:54

## 2024-04-03 ENCOUNTER — HOSPITAL ENCOUNTER (OUTPATIENT)
Facility: MEDICAL CENTER | Age: 59
End: 2024-04-03
Attending: PHYSICIAN ASSISTANT
Payer: COMMERCIAL

## 2024-04-03 PROCEDURE — 87086 URINE CULTURE/COLONY COUNT: CPT

## 2024-04-05 LAB
BACTERIA UR CULT: NORMAL
SIGNIFICANT IND 70042: NORMAL
SITE SITE: NORMAL
SOURCE SOURCE: NORMAL

## 2024-04-06 ENCOUNTER — HOSPITAL ENCOUNTER (OUTPATIENT)
Dept: RADIOLOGY | Facility: MEDICAL CENTER | Age: 59
End: 2024-04-06
Attending: NURSE PRACTITIONER
Payer: COMMERCIAL

## 2024-04-06 DIAGNOSIS — M41.9 SCOLIOSIS, UNSPECIFIED SCOLIOSIS TYPE, UNSPECIFIED SPINAL REGION: ICD-10-CM

## 2024-04-06 PROCEDURE — 72081 X-RAY EXAM ENTIRE SPI 1 VW: CPT

## 2024-07-16 ENCOUNTER — HOSPITAL ENCOUNTER (OUTPATIENT)
Dept: LAB | Facility: MEDICAL CENTER | Age: 59
End: 2024-07-16
Attending: UROLOGY
Payer: COMMERCIAL

## 2024-07-16 LAB
ANION GAP SERPL CALC-SCNC: 13 MMOL/L (ref 7–16)
BUN SERPL-MCNC: 8 MG/DL (ref 8–22)
CALCIUM SERPL-MCNC: 9.2 MG/DL (ref 8.4–10.2)
CHLORIDE SERPL-SCNC: 102 MMOL/L (ref 96–112)
CO2 SERPL-SCNC: 25 MMOL/L (ref 20–33)
CREAT SERPL-MCNC: 0.6 MG/DL (ref 0.5–1.4)
GFR SERPLBLD CREATININE-BSD FMLA CKD-EPI: 104 ML/MIN/1.73 M 2
GLUCOSE SERPL-MCNC: 98 MG/DL (ref 65–99)
POTASSIUM SERPL-SCNC: 3.6 MMOL/L (ref 3.6–5.5)
SODIUM SERPL-SCNC: 140 MMOL/L (ref 135–145)

## 2024-07-16 PROCEDURE — 36415 COLL VENOUS BLD VENIPUNCTURE: CPT

## 2024-07-16 PROCEDURE — 80048 BASIC METABOLIC PNL TOTAL CA: CPT

## 2024-08-29 ENCOUNTER — TELEPHONE (OUTPATIENT)
Dept: SURGICAL ONCOLOGY | Facility: MEDICAL CENTER | Age: 59
End: 2024-08-29
Payer: COMMERCIAL

## 2024-08-29 NOTE — TELEPHONE ENCOUNTER
Patient called wanting to know why Dr. Galaviz has not sent an order to [facility] for her to have a mammogram scheduled. I let her know Dr. Galaviz does not start in our office until last September so we are not able to get an order for her, I suggested call her old office or maybe her PCP or Dr. Martin which both have ordered that test before. Patient understood.

## 2024-09-18 ENCOUNTER — HOSPITAL ENCOUNTER (OUTPATIENT)
Dept: RADIOLOGY | Facility: MEDICAL CENTER | Age: 59
End: 2024-09-18
Attending: FAMILY MEDICINE
Payer: COMMERCIAL

## 2024-09-18 DIAGNOSIS — C50.912 DUCTAL CARCINOMA OF LEFT BREAST (HCC): ICD-10-CM

## 2024-09-18 PROCEDURE — 76642 ULTRASOUND BREAST LIMITED: CPT | Mod: RT

## 2024-09-18 PROCEDURE — G0279 TOMOSYNTHESIS, MAMMO: HCPCS

## 2024-09-30 PROBLEM — Z85.3 PERSONAL HISTORY OF BREAST CANCER: Status: ACTIVE | Noted: 2024-09-30

## 2024-09-30 NOTE — PROGRESS NOTES
Breast Cancer Follow Up Visit    This is a 59 y.o. female who is here for breast cancer follow up.    Alexus underwent a right partial mastectomy and sentinel lymph node biospy on 11/16/2022, pathology revealed IDC, 2 foci, 3mm, DCIS at lateral margin, anterior margin, 3 negative lymph nodes. She underwent a re-excision for margins, which revealed residual DCIS, high grade, lateral margin positive for DCIS over a margin on 1mm.     Mammo/ U/S 9/23: Oval mass identified on mammogram in the right upper outer quadrant is a normal lymph node. Stable benign appearing right calcification. Left breast post operative changes.     F/U 2024:  9/18/2024 2:12 PM     HISTORY/REASON FOR EXAM:  History of DCIS Left breast (due for bilateral); INCLUDE US  Right breast lump     TECHNIQUE/EXAM DESCRIPTION AND NUMBER OF VIEWS:  Bilateral tomosynthesis diagnostic mammography was performed with standard mammographic images generated from the data set. Images were reviewed and interpreted with CAD.     COMPARISON:   Mammogram 11/14/2023 and 9/11/2023     FINDINGS:     MAMMOGRAM:  The breasts are heterogeneously dense, which may obscure small masses.  There are postoperative changes of the left retroareolar breast. This finding is stable. There are a few benign-appearing calcification within the left breast     Assessment the right breast shows a few benign-appearing calcifications and an inferior localization clip. No mass is apparent at site of lower inner palpable lump.     ULTRASOUND:     Right breast ultrasound was performed at site of palpable lump. There is normal-appearing fibroglandular tissue. There is no mass or cyst.     IMPRESSION:     1.  No evidence for malignancy, specifically no abnormality at site of right lower inner palpable lump     2.  Postoperative changes of the left breast     These results were given to the patient at the time of visit.     R2 - CATEGORY 2: BENIGN FINDING(S)    ROS    Home Medications     Medication Sig Taking? Last Dose Authorizing Provider   ibuprofen (MOTRIN) 400 MG Tab Take 800 mg by mouth 2 times a day.   Physician Outpatient   multivitamin Tab Take 1 Tablet by mouth every day.   Physician Outpatient   calcium-vitamin D 250-3.125 MG-MCG Tab tablet Take 1 Tablet by mouth every day.   Physician Outpatient   Levocetirizine Dihydrochloride (XYZAL ALLERGY 24HR) 5 MG Tab Take  by mouth.   Physician Outpatient   pantoprazole (PROTONIX) 40 MG Tablet Delayed Response Take 40 mg by mouth every day.   Physician Outpatient   Acetaminophen-Codeine 300-30 MG Tab Take  by mouth as needed.   Physician Outpatient   famotidine (PEPCID) 20 MG Tab Take 1 Tablet by mouth every evening.   Physician Outpatient   Levocetirizine Dihydrochloride 5 MG Tab Take  by mouth every day.   Physician Outpatient   levothyroxine (SYNTHROID) 25 MCG Tab every evening.   Physician Outpatient   amLODIPine (NORVASC) 5 MG Tab every evening.   Physician Outpatient       Physical Exam        Personal history of breast cancer  Doing well, no e/o recurrence.       Mikayla Galaviz M.D., FACS  Department of Surgical Oncology  [facility]  [phone]                  Normal range of motion.      Cervical back: Normal range of motion and neck supple. Normal range of motion.   Lymphadenopathy:      Cervical: No cervical adenopathy.   Skin:     General: Skin is warm and dry.   Neurological:      General: No focal deficit present.      Mental Status: She is alert and oriented to person, place, and time.   Psychiatric:         Mood and Affect: Mood normal.         Behavior: Behavior normal.       Personal history of breast cancer  Doing well, no e/o recurrence.       Mikayla Galaviz M.D., FACS  Department of Surgical Oncology  [facility]  [phone]

## 2024-10-03 ENCOUNTER — APPOINTMENT (OUTPATIENT)
Dept: SURGICAL ONCOLOGY | Facility: MEDICAL CENTER | Age: 59
End: 2024-10-03
Payer: COMMERCIAL

## 2024-10-03 VITALS
DIASTOLIC BLOOD PRESSURE: 74 MMHG | SYSTOLIC BLOOD PRESSURE: 128 MMHG | HEIGHT: 62 IN | WEIGHT: 113 LBS | HEART RATE: 87 BPM | TEMPERATURE: 97.6 F | OXYGEN SATURATION: 93 % | BODY MASS INDEX: 20.8 KG/M2

## 2024-10-03 DIAGNOSIS — Z85.3 PERSONAL HISTORY OF BREAST CANCER: ICD-10-CM

## 2024-10-03 PROCEDURE — 3078F DIAST BP <80 MM HG: CPT | Performed by: SURGERY

## 2024-10-03 PROCEDURE — 99213 OFFICE O/P EST LOW 20 MIN: CPT | Performed by: SURGERY

## 2024-10-03 PROCEDURE — 3074F SYST BP LT 130 MM HG: CPT | Performed by: SURGERY

## 2024-10-03 RX ORDER — ROMOSOZUMAB-AQQG 105 MG/1.17ML
INJECTION, SOLUTION SUBCUTANEOUS
COMMUNITY

## 2024-10-03 ASSESSMENT — ENCOUNTER SYMPTOMS
CHILLS: 0
MEMORY LOSS: 0
HEADACHES: 0
NERVOUS/ANXIOUS: 0
DEPRESSION: 0
SPUTUM PRODUCTION: 0
PALPITATIONS: 0
COUGH: 0
INSOMNIA: 0
EYES NEGATIVE: 1
MUSCULOSKELETAL NEGATIVE: 1
WEIGHT LOSS: 0
GASTROINTESTINAL NEGATIVE: 1
WEAKNESS: 0
DIZZINESS: 0
FEVER: 0

## 2024-10-16 ENCOUNTER — APPOINTMENT (OUTPATIENT)
Dept: RADIOLOGY | Facility: MEDICAL CENTER | Age: 59
End: 2024-10-16
Attending: FAMILY MEDICINE
Payer: COMMERCIAL

## 2024-10-16 ENCOUNTER — HOSPITAL ENCOUNTER (OUTPATIENT)
Dept: RADIOLOGY | Facility: MEDICAL CENTER | Age: 59
End: 2024-10-16
Attending: NURSE PRACTITIONER
Payer: COMMERCIAL

## 2024-10-16 DIAGNOSIS — M81.0 SENILE OSTEOPOROSIS: ICD-10-CM

## 2024-10-16 PROCEDURE — 77080 DXA BONE DENSITY AXIAL: CPT

## 2025-02-18 ENCOUNTER — HOSPITAL ENCOUNTER (OUTPATIENT)
Dept: RADIOLOGY | Facility: MEDICAL CENTER | Age: 60
End: 2025-02-18
Payer: COMMERCIAL

## 2025-03-02 ENCOUNTER — OFFICE VISIT (OUTPATIENT)
Dept: URGENT CARE | Facility: PHYSICIAN GROUP | Age: 60
End: 2025-03-02
Payer: COMMERCIAL

## 2025-03-02 VITALS
TEMPERATURE: 97.9 F | DIASTOLIC BLOOD PRESSURE: 78 MMHG | HEART RATE: 92 BPM | HEIGHT: 63 IN | OXYGEN SATURATION: 95 % | SYSTOLIC BLOOD PRESSURE: 122 MMHG | WEIGHT: 119.5 LBS | RESPIRATION RATE: 18 BRPM | BODY MASS INDEX: 21.17 KG/M2

## 2025-03-02 DIAGNOSIS — B97.89 VIRAL SINUSITIS: ICD-10-CM

## 2025-03-02 DIAGNOSIS — J32.9 VIRAL SINUSITIS: ICD-10-CM

## 2025-03-02 PROCEDURE — 3074F SYST BP LT 130 MM HG: CPT | Performed by: STUDENT IN AN ORGANIZED HEALTH CARE EDUCATION/TRAINING PROGRAM

## 2025-03-02 PROCEDURE — 3078F DIAST BP <80 MM HG: CPT | Performed by: STUDENT IN AN ORGANIZED HEALTH CARE EDUCATION/TRAINING PROGRAM

## 2025-03-02 PROCEDURE — 99213 OFFICE O/P EST LOW 20 MIN: CPT | Performed by: STUDENT IN AN ORGANIZED HEALTH CARE EDUCATION/TRAINING PROGRAM

## 2025-03-02 RX ORDER — BENZONATATE 100 MG/1
1 CAPSULE ORAL 3 TIMES DAILY
COMMUNITY

## 2025-03-02 RX ORDER — BUDESONIDE AND FORMOTEROL FUMARATE 80; 4.5 UG/1; UG/1
AEROSOL, METERED RESPIRATORY (INHALATION)
COMMUNITY
Start: 2025-02-04

## 2025-03-02 ASSESSMENT — ENCOUNTER SYMPTOMS: SINUS PAIN: 1

## 2025-03-02 NOTE — PROGRESS NOTES
Subjective:   Alexus Saldaña is a 60 y.o. female who presents for Sinusitis (Sinus pressure and sinus headache x 2 days. Got worse today.)      HPI:  52-year-old female presents with 2 days of sinus pressure and sinus headache and sinus congestion.  She has not tried any at home medications at this time.  She reports her daughter had a sinus infection approximately 2 weeks ago and was given antibiotics for this.  - She does have a complex medical history of possible lung cancer and a nodule last night.  She was recently hospitalized at [facility] after a complication during her lung biopsy.  She reports since that time she has not had any fevers or chills she does not have any significant cough at this time other than postnasal drip when she lies down making her cough.  She denies any shortness of breath.  - She has no fevers or chills  - Sinus pressure and ear pressure radiation towards her jaw.  - She denies any tooth pain.    Review of Systems   HENT:  Positive for congestion and sinus pain.        Medications:    amLODIPine Tabs  benzonatate Caps  Breyna Aero  calcium-vitamin D Tabs  famotidine Tabs  Levocetirizine Dihydrochloride Tabs  levothyroxine Tabs  multivitamin Tabs  pantoprazole Tbec  romosozumab-aqqg Sosy    Allergies: Hydrocodone    Problem List: Alexus Saldaña does not have any pertinent problems on file.    Surgical History:  Past Surgical History:   Procedure Laterality Date    PB MASTECTOMY, PARTIAL Left 12/09/2022    Procedure: LEFT RE-EXICISION PARTIAL MASTECTOMY;  Surgeon: Mikayla Galaviz M.D.;  Location: SURGERY SAME DAY [facility];  Service: General    PB MASTECTOMY, PARTIAL Left 11/16/2022    Procedure: LEFT PARTIAL MASTECTOMY WITH LEFT AXILLARY SENTINEL LYMPH NODE BIOPSY;  Surgeon: Mikayla Galaviz M.D.;  Location: SURGERY SAME DAY [facility];  Service: General    BREAST BIOPSY Right 2014    BIOPSY GENERAL      thyroid    OTHER ORTHOPEDIC SURGERY      [place] CHEMOTHERAPY, UNSPECIFIED  "PROCEDURE      [place] RADIATION THERAPY PLAN SIMPLE      PRIMARY C SECTION      x2       Past Social Hx: Alexus Saldaña  reports that she has quit smoking. Her smoking use included cigarettes. She has been exposed to tobacco smoke. She has never used smokeless tobacco. She reports that she does not currently use alcohol after a past usage of about 2.4 oz of alcohol per week. She reports that she does not currently use drugs.     Past Family Hx:  Alexus Saldaña family history includes Cancer in her mother; Lung Disease in her mother.     Problem list, medications, and allergies reviewed by myself today in Epic.     Objective:     /78 (BP Location: Left arm, Patient Position: Sitting, BP Cuff Size: Small adult)   Pulse 92   Temp 36.6 °C (97.9 °F) (Temporal)   Resp 18   Ht 1.588 m (5' 2.5\")   Wt 54.2 kg (119 lb 8 oz)   LMP 04/01/2015   SpO2 95%   BMI 21.51 kg/m²     Physical Exam  Constitutional:       Appearance: Normal appearance.   HENT:      Head: Normocephalic and atraumatic.      Right Ear: Tympanic membrane normal.      Left Ear: Tympanic membrane normal.      Nose: Rhinorrhea present. No congestion.      Mouth/Throat:      Mouth: Mucous membranes are moist.      Pharynx: Oropharynx is clear.   Eyes:      Extraocular Movements: Extraocular movements intact.      Conjunctiva/sclera: Conjunctivae normal.   Cardiovascular:      Rate and Rhythm: Normal rate and regular rhythm.      Pulses: Normal pulses.      Heart sounds: Normal heart sounds.   Pulmonary:      Effort: Pulmonary effort is normal.      Breath sounds: Normal breath sounds.   Neurological:      Mental Status: She is alert.         Assessment/Plan:     Diagnosis and associated orders:     1. Viral sinusitis           Comments/MDM:     1. Viral sinusitis      Symptomatic Management  - Nasal decongestants (e.g., pseudoephedrine 30 mg every 4-6 hours as tolerated) for congestion.  - Saline nasal irrigation: Recommend using saline spray or " neti pot to flush out nasal passages.  - Intranasal corticosteroids (e.g., Fluticasone nasal spray) to reduce inflammation, especially if there is a history of allergies.  - Pain relief: Recommend acetaminophen or ibuprofen for pain and fever.  -Warm humidified air or steam inhalation for relief for congestion- Use of once daily antihistamine.  -Follow up in 7-10 days or sooner if worsening    Education     - Advise the patient on proper use of nasal sprays and decongestants to avoid overuse.     - Encourage adequate hydration and rest.     - Advise avoiding triggers such as smoking or environmental irritants.    Patient educated on on signs of complications (e.g., fever, worsening headache, facial swelling, vision changes) and when to seek further medical attention.             Differential diagnosis, natural history, supportive care, and indications for immediate follow-up discussed.    Advised the patient to follow-up with the primary care physician for recheck, reevaluation, and consideration of further management.    Please note that this dictation was created using voice recognition software. I have made a reasonable attempt to correct obvious errors, but I expect that there are errors of grammar and possibly content that I did not discover before finalizing the note.    Sen Sesay M.D.

## 2025-05-14 ENCOUNTER — HOSPITAL ENCOUNTER (OUTPATIENT)
Facility: MEDICAL CENTER | Age: 60
End: 2025-05-14
Attending: FAMILY MEDICINE
Payer: COMMERCIAL

## 2025-05-14 ENCOUNTER — OFFICE VISIT (OUTPATIENT)
Dept: URGENT CARE | Facility: PHYSICIAN GROUP | Age: 60
End: 2025-05-14
Payer: COMMERCIAL

## 2025-05-14 VITALS
DIASTOLIC BLOOD PRESSURE: 88 MMHG | HEIGHT: 62 IN | WEIGHT: 124.78 LBS | RESPIRATION RATE: 16 BRPM | HEART RATE: 100 BPM | TEMPERATURE: 97.3 F | BODY MASS INDEX: 22.96 KG/M2 | SYSTOLIC BLOOD PRESSURE: 142 MMHG | OXYGEN SATURATION: 95 %

## 2025-05-14 DIAGNOSIS — R30.0 DYSURIA: ICD-10-CM

## 2025-05-14 DIAGNOSIS — R30.0 DYSURIA: Primary | ICD-10-CM

## 2025-05-14 DIAGNOSIS — R10.9 FLANK PAIN: ICD-10-CM

## 2025-05-14 LAB
APPEARANCE UR: CLEAR
BILIRUB UR STRIP-MCNC: NEGATIVE MG/DL
COLOR UR AUTO: YELLOW
GLUCOSE UR STRIP.AUTO-MCNC: NEGATIVE MG/DL
KETONES UR STRIP.AUTO-MCNC: NEGATIVE MG/DL
LEUKOCYTE ESTERASE UR QL STRIP.AUTO: NORMAL
NITRITE UR QL STRIP.AUTO: NEGATIVE
PH UR STRIP.AUTO: 7 [PH] (ref 5–8)
PROT UR QL STRIP: NEGATIVE MG/DL
RBC UR QL AUTO: NORMAL
SP GR UR STRIP.AUTO: 1.01
UROBILINOGEN UR STRIP-MCNC: 0.2 MG/DL

## 2025-05-14 PROCEDURE — 81002 URINALYSIS NONAUTO W/O SCOPE: CPT | Performed by: FAMILY MEDICINE

## 2025-05-14 PROCEDURE — 3079F DIAST BP 80-89 MM HG: CPT | Performed by: FAMILY MEDICINE

## 2025-05-14 PROCEDURE — 87086 URINE CULTURE/COLONY COUNT: CPT

## 2025-05-14 PROCEDURE — 99213 OFFICE O/P EST LOW 20 MIN: CPT | Performed by: FAMILY MEDICINE

## 2025-05-14 PROCEDURE — 3077F SYST BP >= 140 MM HG: CPT | Performed by: FAMILY MEDICINE

## 2025-05-14 RX ORDER — ALBUTEROL SULFATE 90 UG/1
INHALANT RESPIRATORY (INHALATION)
COMMUNITY

## 2025-05-14 RX ORDER — LORATADINE 10 MG/1
10 TABLET ORAL DAILY
COMMUNITY

## 2025-05-14 RX ORDER — CEFDINIR 300 MG/1
300 CAPSULE ORAL 2 TIMES DAILY
Qty: 10 CAPSULE | Refills: 0 | Status: SHIPPED | OUTPATIENT
Start: 2025-05-14 | End: 2025-05-19

## 2025-05-14 ASSESSMENT — ENCOUNTER SYMPTOMS: FLANK PAIN: 1

## 2025-05-14 NOTE — PROGRESS NOTES
"Subjective     Alexus Saldaña is a 60 y.o. female who presents with Other (X1 week patient is having side pain, fever, and urinary frequency. Patient states when gets a UTI does not get the common symptoms. )    This is a  new problem with uncertain prognosis:    60 y.o. who has come to the walk-in clinic today for 1 week with right flank pain.  He feels it in the right side of the back and a little bit on the right side of abdomen.  Fairly constant back pain certain movements and positions make it worse.  No nausea vomiting no stool changes has some slight increase in urinary frequency in the last week and was worried might be related to a UTI.says had a low-grade fever today of around 99..  No rashes no trauma.  No lower limb weakness or numbness    History of lung cancer and breast cancer.  Being followed by local oncologist.  Says had a PET scan about 2 months ago at their office which shows some involvement around the liver and some lymph nodes in that area.      ALLERGIES:  Hydrocodone     PMH:  Past Medical History[1]     PSH:  Past Surgical History[2]    MEDS:  Current Medications[3]    ** I have documented what I find to be significant in regards to past medical, social, family and surgical history  in my HPI or under PMH/PSH/FH review section, otherwise it is noncontributory **           HPI    Review of Systems   Genitourinary:  Positive for flank pain and frequency.   All other systems reviewed and are negative.             Objective     BP (!) 142/88   Pulse 100   Temp 36.3 °C (97.3 °F) (Temporal)   Resp 16   Ht 1.575 m (5' 2\")   Wt 56.6 kg (124 lb 12.5 oz)   LMP 04/01/2015   SpO2 95%   BMI 22.82 kg/m²      Physical Exam  Vitals and nursing note reviewed.   Constitutional:       General: She is not in acute distress.     Appearance: Normal appearance. She is well-developed. She is not ill-appearing, toxic-appearing or diaphoretic.   HENT:      Head: Normocephalic.   Cardiovascular:      Rate " and Rhythm: Normal rate and regular rhythm.      Heart sounds: Normal heart sounds.   Pulmonary:      Effort: Pulmonary effort is normal. No respiratory distress.      Breath sounds: Normal breath sounds.   Abdominal:      Palpations: Abdomen is soft.      Tenderness: There is no guarding or rebound.   Musculoskeletal:        Arms:       Comments: No rash noted.  Minimal tenderness to palpation   Neurological:      Mental Status: She is alert.      Motor: No abnormal muscle tone.   Psychiatric:         Mood and Affect: Mood normal.         Behavior: Behavior normal.         1. Dysuria  POCT Urinalysis    URINE CULTURE(NEW)    cefdinir (OMNICEF) 300 MG Cap      2. Flank pain  cefdinir (OMNICEF) 300 MG Cap      60-year-old with history of breast cancer and lung cancer.  Being followed by local oncologist.  Recently has some right flank area pain for a week and a little bit of urinary frequency with concerns of UTI.  Urinalysis here does show some leuks and little bit of blood we will send for culture sensitivity.  Have low suspicion of pyelonephritis.  Seems more musculoskeletal.  May be related to some of the lymph node spread of her cancer.  Encouraged her to follow-up with PCP.  Did discuss imaging of the back to rule out any bone involvement or CT to rule out renal stone.  Says will prefer to try the antibiotic first and if pain not improving will go to ER in the next 2 or 3 days or follow-up with the oncologist    - Dx, plan & d/c instructions discussed   - Rest, stay hydrated, OTC Motrin and/or Tylenol as needed      Follow up with your regular primary care providers office within a week to keep them updated and informed of this visit and for regular routine health maintenance check-ups. ER if not improving in 2-3 days or if feeling/getting worse. (If you do not have a primary care provider and need to schedule one you may call [facility] at [phone] to do this).    Patient left in stable condition  "              Discussed if any in-clinic testing done they should check [facility] later today for results and instructions.  Testing such as strep, covid, flu, RSV and x-rays    Discussed if any testing, labs or imaging studies are obtained outside of the [facility] facility, it is their responsibility to contact the Urgent Care and let us know that it was done and get us the results so adequate follow up can be initiated    Any pertinent prior lab work and/or imaging studies in Epic have been reviewed by me today on day of this visit and taken into account for my treatment and plan today    Any pertinent PMH/PSH and/or chronic conditions and medications if any were reviewed today and taken into account for my treatment and plan today    Pertinent prior office visit, ER and urgent care notes in [facility] have been reviewed by me today on day of this visit.    Please note that this dictation may have been created using voice recognition software, if so I have made every reasonable attempt to correct obvious errors, but I expect that there are errors of grammar and possibly content that I did not discover before finalizing the note.            [1]   Past Medical History:  Diagnosis Date    Cancer (HCC)     Carcinoma in situ of respiratory system     Diabetes (HCC)     Prediabetes    Disorder of thyroid     Thyroid Nodule    Emphysema of lung (HCC)     GERD (gastroesophageal reflux disease)     Heart burn     Hiatus hernia syndrome 11/07/2022    Hypertension     Indigestion     Osteoporosis     Stroke (HCC)     TIA (transient ischemic attack) 1989    pt states \"I have been diagnosed with mini strokes\"   [2]   Past Surgical History:  Procedure Laterality Date    PB MASTECTOMY, PARTIAL Left 12/09/2022    Procedure: LEFT RE-EXICISION PARTIAL MASTECTOMY;  Surgeon: Mikayla Galaviz M.D.;  Location: SURGERY SAME DAY [facility];  Service: General    PB MASTECTOMY, PARTIAL Left 11/16/2022    Procedure: LEFT PARTIAL MASTECTOMY WITH LEFT " AXILLARY SENTINEL LYMPH NODE BIOPSY;  Surgeon: Mikayla Galaviz M.D.;  Location: SURGERY SAME DAY [facility];  Service: General    BREAST BIOPSY Right 2014    BIOPSY GENERAL      thyroid    OTHER ORTHOPEDIC SURGERY      MD CHEMOTHERAPY, UNSPECIFIED PROCEDURE      MD RADIATION THERAPY PLAN SIMPLE      PRIMARY C SECTION      x2   [3]   Current Outpatient Medications:     loratadine (CLARITIN) 10 MG Tab, Take 10 mg by mouth every day., Disp: , Rfl:     cefdinir (OMNICEF) 300 MG Cap, Take 1 Capsule by mouth 2 times a day for 5 days., Disp: 10 Capsule, Rfl: 0    BREYNA 80-4.5 MCG/ACT Aerosol, INHALE 2 PUFFS TWICE A DAY FOR 30 DAYS, Disp: , Rfl:     multivitamin Tab, Take 1 Tablet by mouth every day., Disp: , Rfl:     famotidine (PEPCID) 20 MG Tab, Take 20 mg by mouth every evening., Disp: , Rfl:     amLODIPine (NORVASC) 5 MG Tab, every evening., Disp: , Rfl:     albuterol 108 (90 Base) MCG/ACT Aero Soln inhalation aerosol, PLEASE SEE ATTACHED FOR DETAILED DIRECTIONS, Disp: , Rfl:

## 2025-05-17 LAB
BACTERIA UR CULT: NORMAL
SIGNIFICANT IND 70042: NORMAL
SITE SITE: NORMAL
SOURCE SOURCE: NORMAL

## 2025-06-13 ENCOUNTER — HOSPITAL ENCOUNTER (OUTPATIENT)
Dept: RADIOLOGY | Facility: MEDICAL CENTER | Age: 60
End: 2025-06-13
Payer: COMMERCIAL

## (undated) DEVICE — TOWEL STOP TIMEOUT SAFETY FLAG (40EA/CA)

## (undated) DEVICE — CANISTER SUCTION RIGID RED 1500CC (40EA/CA)

## (undated) DEVICE — LACTATED RINGERS INJ 1000 ML - (14EA/CA 60CA/PF)

## (undated) DEVICE — SODIUM CHL IRRIGATION 0.9% 1000ML (12EA/CA)

## (undated) DEVICE — SYRINGE 30 ML LL (56/BX)

## (undated) DEVICE — SLEEVE VASO CALF MED - (10PR/CA)

## (undated) DEVICE — SUTURE 4-0 MONOCRYL PLUS PS-2 - 27 INCH (36/BX)

## (undated) DEVICE — COVER CIV-FLEX TRANSDUCER - (24/BX)

## (undated) DEVICE — Device

## (undated) DEVICE — GLOVE BIOGEL INDICATOR SZ 7SURGICAL PF LTX - (50/BX 4BX/CA)

## (undated) DEVICE — CANISTER SUCTION 3000ML MECHANICAL FILTER AUTO SHUTOFF MEDI-VAC NONSTERILE LF DISP  (40EA/CA)

## (undated) DEVICE — SET LEADWIRE 5 LEAD BEDSIDE DISPOSABLE ECG (1SET OF 5/EA)

## (undated) DEVICE — APPLIER OPEN MEDIUM CLIP (6EA/BX)

## (undated) DEVICE — CANNULA W/ SUPPLY TUBING O2 - (50/CA)

## (undated) DEVICE — GOWN WARMING STANDARD FLEX - (30/CA)

## (undated) DEVICE — SHEET TRANSVERSE LAP - (12EA/CA)

## (undated) DEVICE — WATER IRRIGATION STERILE 1000ML (12EA/CA)

## (undated) DEVICE — MASK, LARYNGEAL AIRWAY #4

## (undated) DEVICE — SUTURE 3-0 VICRYL PLUS SH - 8X 18 INCH (12/BX)

## (undated) DEVICE — GOWN SURGEONS LARGE - (32/CA)

## (undated) DEVICE — BOVIE BLADE COATED &INSULATED - 25/PK

## (undated) DEVICE — GLOVE BIOGEL SZ 8 SURGICAL PF LTX - (50PR/BX 4BX/CA)

## (undated) DEVICE — TUBE CONNECTING SUCTION - CLEAR PLASTIC STERILE 72 IN (50EA/CA)

## (undated) DEVICE — SUTURE GENERAL

## (undated) DEVICE — SUCTION INSTRUMENT YANKAUER BULBOUS TIP W/O VENT (50EA/CA)

## (undated) DEVICE — TUBING CLEARLINK DUO-VENT - C-FLO (48EA/CA)

## (undated) DEVICE — KIT  I.V. START (100EA/CA)

## (undated) DEVICE — SENSOR OXIMETER ADULT SPO2 RD SET (20EA/BX)

## (undated) DEVICE — MASK OXYGEN VNYL ADLT MED CONC WITH 7 FOOT TUBING  - (50EA/CA)

## (undated) DEVICE — DERMABOND ADVANCED - (12EA/BX)